# Patient Record
Sex: FEMALE | Race: WHITE | NOT HISPANIC OR LATINO | Employment: FULL TIME | URBAN - METROPOLITAN AREA
[De-identification: names, ages, dates, MRNs, and addresses within clinical notes are randomized per-mention and may not be internally consistent; named-entity substitution may affect disease eponyms.]

---

## 2017-01-06 ENCOUNTER — CARE COORDINATION (OUTPATIENT)
Dept: FAMILY MEDICINE | Facility: CLINIC | Age: 67
End: 2017-01-06

## 2017-01-06 NOTE — PROGRESS NOTES
Care Coordination Assessment    PCP: Barbra Puga    Referral Source:  ED/IP      Clinical Data: Patient discharged from inpatient at [facility] 12/30/2016 S/P right total hip replacement.  Patient discharged home with instructions to follow up with Ortho in 2 weeks.    Plan: I will forward to clinical support to assess and assist with appropriate follow up.

## 2017-01-10 NOTE — TELEPHONE ENCOUNTER
Spoke to Keyonna to see how her hip was coming along.  She said that she is walking with a cane and is doing great.  She has a follow up with her surgeon today and does not need to see PCP at this time.

## 2017-02-07 ENCOUNTER — THERAPY VISIT (OUTPATIENT)
Dept: PHYSICAL THERAPY | Facility: CLINIC | Age: 67
End: 2017-02-07
Payer: COMMERCIAL

## 2017-02-07 DIAGNOSIS — Z47.1 AFTERCARE FOLLOWING RIGHT HIP JOINT REPLACEMENT SURGERY: Primary | ICD-10-CM

## 2017-02-07 DIAGNOSIS — Z96.641 AFTERCARE FOLLOWING RIGHT HIP JOINT REPLACEMENT SURGERY: Primary | ICD-10-CM

## 2017-02-07 DIAGNOSIS — Z96.641 PRESENCE OF RIGHT HIP IMPLANT: ICD-10-CM

## 2017-02-07 PROCEDURE — 97161 PT EVAL LOW COMPLEX 20 MIN: CPT | Mod: GP | Performed by: PHYSICAL THERAPIST

## 2017-02-07 PROCEDURE — 97110 THERAPEUTIC EXERCISES: CPT | Mod: GP | Performed by: PHYSICAL THERAPIST

## 2017-02-07 ASSESSMENT — ACTIVITIES OF DAILY LIVING (ADL)
WALKING_INITIALLY: SLIGHT DIFFICULTY
GETTING_INTO_AND_OUT_OF_AN_AVERAGE_CAR: SLIGHT DIFFICULTY
STEPPING_UP_AND_DOWN_CURBS: SLIGHT DIFFICULTY
HOW_WOULD_YOU_RATE_YOUR_CURRENT_LEVEL_OF_FUNCTION_DURING_YOUR_USUAL_ACTIVITIES_OF_DAILY_LIVING_FROM_0_TO_100_WITH_100_BEING_YOUR_LEVEL_OF_FUNCTION_PRIOR_TO_YOUR_HIP_PROBLEM_AND_0_BEING_THE_INABILITY_TO_PERFORM_ANY_OF_YOUR_USUAL_DAILY_ACTIVITIES?: 90
HOS_ADL_SCORE(%): 70.31
WALKING_15_MINUTES_OR_GREATER: SLIGHT DIFFICULTY
WALKING_APPROXIMATELY_10_MINUTES: SLIGHT DIFFICULTY
RECREATIONAL_ACTIVITIES: SLIGHT DIFFICULTY
DEEP_SQUATTING: MODERATE DIFFICULTY
WALKING_UP_STEEP_HILLS: MODERATE DIFFICULTY
PUTTING_ON_SOCKS_AND_SHOES: EXTREME DIFFICULTY
HEAVY_WORK: SLIGHT DIFFICULTY
HOS_ADL_COUNT: 16
WALKING_DOWN_STEEP_HILLS: MODERATE DIFFICULTY
TWISTING/PIVOTING_ON_INVOLVED_LEG: SLIGHT DIFFICULTY
HOS_ADL_ITEM_SCORE_TOTAL: 45
STANDING_FOR_15_MINUTES: SLIGHT DIFFICULTY
HOS_ADL_HIGHEST_POTENTIAL_SCORE: 64
GOING_DOWN_1_FLIGHT_OF_STAIRS: SLIGHT DIFFICULTY
ROLLING_OVER_IN_BED: SLIGHT DIFFICULTY
GOING_UP_1_FLIGHT_OF_STAIRS: SLIGHT DIFFICULTY
LIGHT_TO_MODERATE_WORK: SLIGHT DIFFICULTY
SITTING_FOR_15_MINUTES: SLIGHT DIFFICULTY

## 2017-02-07 NOTE — Clinical Note
[facility] ATH[facility]IC [facility] PHYSICAL THERAPY  [address] [place] [id number] [phone]    2017    Re: Keyonna Garcia   :   1950  MRN:  [id number]   REFERRING PHYSICIAN:   Rolando Baeza    [facility] ATHLETIC [facility] PHYSICAL [facility]    Date of Initial Evaluation:  2016  Visits:  Rxs Used: 1  Reason for Referral:     Aftercare following right hip joint replacement surgery  Presence of right hip implant    EVALUATION SUMMARY    Subjective:    Keyonna Garcia is a 66 year old female with a right hip condition.  Condition occurred with:  Degenerative joint disease.  Condition occurred: for unknown reasons.  This is a new condition  Patient had a R TKA with anterior approach 2016.  She was using a cane for ambulation but has been going without it lately due to not relying much on it anymore. She lives with her son in an apartment without stairs.  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    Patient reports pain:  Anterior.  Radiates to:  Thigh and low back.  Pain is described as aching and is intermittent and reported as 1/10.  Associated symptoms:  Loss of strength and loss of motion/stiffness. Pain is worse in the A.M..  Exacerbated by: walking 1-2 blocks, position changes, standing 15 minutes, stairs--non-reciprocal pattern usually, sitting 15 minutes, putting shoes/socks on. and relieved by rest.  Since onset symptoms are gradually improving.  Special testing: none.  Previous treatment: none. General health as reported by patient is good.  Pertinent medical history includes:  Osteoarthritis, overweight and high blood pressure.  Medical allergies: no.    Current medications:  High blood pressure medication and other (norvasc,aspirin, simvastatin, prilosec).  Current occupation is RN.  Patient is currently not working due to present treatment  problem (returns to work tomorrow).  Primary job tasks include:  Prolonged sitting.  Barriers include:  None as reported by the patient.  Red flags:  None as reported by the patient.                  Objective:    Gait:  Mild deviation with gait due to decreased R stance time, decreased L step-length, decreased paul  Gait Type:  Antalgic     Hip Evaluation  Hip PROM:  Hip PROM:  Left Hip:    Right Hip:  Not assessed (due to surgery)      Re: Keyonna Garcia   :   1950    Objective continued:    Hip Strength:    Flexion:   Right: 5/5   -  Pain:  Extension:  Right: 5-/5    -  Pain:    Abduction:  Right: 4-/5   +/-   Pain:  Adduction:  Right: 5/5   -  Pain:  Knee Flexion:  Right: 5/5   -  Pain:  Knee Extension:  Right: 5/5    -  Pain:  Hip Palpation:  Palpations normal left hip: incision healing well--no signs of infection noted.  Functional Testing:  Functional test hip: SLS R, floor, EO--mild opposite hip hike noted.    Assessment/Plan:      Patient is a 66 year old female with right side hip complaints s/p DAVE with anterior approach on 2016.  She is doing well overall.  She has decreased strength and altered gait as expected post-surgery.  She should do well with treatment focusing on strengthening exercises to improve overall mobility and function.      Patient has the following significant findings with corresponding treatment plan.                Diagnosis 1:  S/p R DAVE--anterior approach  Pain -  self management, education and home program  Decreased strength - therapeutic exercise, therapeutic activities and home program  Impaired gait - gait training and home program  Decreased function - therapeutic activities and home program  Therapy Evaluation Codes:   1) History comprised of:   Personal factors that impact the plan of care:      None.    Comorbidity factors that impact the plan of care are:      None.     Medications impacting care: None.  2) Examination of Body Systems comprised  of:   Body structures and functions that impact the plan of care:      Hip.   Activity limitations that impact the plan of care are:      Bending, Dressing, Sitting, Squatting/kneeling, Stairs and Walking.  3) Clinical presentation characteristics are:   Stable/Uncomplicated.  4) Decision-Making    Low complexity using standardized patient assessment instrument and/or measureable assessment of functional outcome.  Cumulative Therapy Evaluation is: Low complexity.  Previous and current functional limitations:  (See Goal Flow Sheet for this information)    Short term and Long term goals: (See Goal Flow Sheet for this information)   Communication ability:  Patient appears to be able to clearly communicate and understand verbal and written communication and follow directions correctly.  Treatment Explanation - The following has been discussed with the patient:   RX ordered/plan of care  Anticipated outcomes  Possible risks and side effects      Re: Keyonna Garcia   :   1950    Assessment continued:    This patient would benefit from PT intervention to resume normal activities.   Rehab potential is good.    Frequency:  1 X week, once daily  Duration:  for 4 weeks tapering to 2 X a month over 1 month  Discharge Plan:  Achieve all LTG.  Independent in home treatment program.  Reach maximal therapeutic benefit.                    Thank you for your referral.    INQUIRIES  Therapist: BAIRON RomanoT, Cert. MDT  [facility] PHYSICAL THERAPY  [address] [address]  Phone: [phone]  Fax: [phone]

## 2017-02-07 NOTE — PROGRESS NOTES
Subjective:    Keyonna Gracia is a 66 year old female with a right hip condition.  Condition occurred with:  Degenerative joint disease.  Condition occurred: for unknown reasons.  This is a new condition  Patient had a R TKA with anterior approach 12/27/2016.  She was using a cane for ambulation but has been going without it lately due to not relying much on it anymore. She lives with her son in an apartment without stairs.  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    Patient reports pain:  Anterior.  Radiates to:  Thigh and low back.  Pain is described as aching and is intermittent and reported as 1/10.  Associated symptoms:  Loss of strength and loss of motion/stiffness. Pain is worse in the A.M..  Exacerbated by: walking 1-2 blocks, position changes, standing 15 minutes, stairs--non-reciprocal pattern usually, sitting 15 minutes, putting shoes/socks on. and relieved by rest.  Since onset symptoms are gradually improving.  Special testing: none.  Previous treatment: none.    General health as reported by patient is good.  Pertinent medical history includes:  Osteoarthritis, overweight and high blood pressure.  Medical allergies: no.    Current medications:  High blood pressure medication and other (norvasc,aspirin, simvastatin, prilosec).  Current occupation is RN.  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  Primary job tasks include:  Prolonged sitting.    Barriers include:  None as reported by the patient.    Red flags:  None as reported by the patient.                      Objective:      Gait:  Mild deviation with gait due to decreased R stance time, decreased L step-length, decreased paul  Gait Type:  Antalgic                                                      Hip Evaluation  Hip PROM:  Hip PROM:  Left Hip:    Right Hip:  Not assessed (due to surgery)                           Hip Strength:    Flexion:   Right: 5/5   -  Pain:                    Extension:  Right: 5-/5    -  Pain:    Abduction:  Right: 4-/5   +/-   Pain:  Adduction:  Right: 5/5   -  Pain:      Knee Flexion:  Right: 5/5   -  Pain:  Knee Extension:  Right: 5/5    -  Pain:          Hip Palpation:  Palpations normal left hip: incision healing well--no signs of infection noted.      Functional Testing:  Functional test hip: SLS R, floor, EO--mild opposite hip hike noted.                         General     ROS    Assessment/Plan:      Patient is a 66 year old female with right side hip complaints s/p DAVE with anterior approach on 12/27/2016.  She is doing well overall.  She has decreased strength and altered gait as expected post-surgery.  She should do well with treatment focusing on strengthening exercises to improve overall mobility and function.      Patient has the following significant findings with corresponding treatment plan.                Diagnosis 1:  S/p R DAVE--anterior approach  Pain -  self management, education and home program  Decreased strength - therapeutic exercise, therapeutic activities and home program  Impaired gait - gait training and home program  Decreased function - therapeutic activities and home program    Therapy Evaluation Codes:   1) History comprised of:   Personal factors that impact the plan of care:      None.    Comorbidity factors that impact the plan of care are:      None.     Medications impacting care: None.  2) Examination of Body Systems comprised of:   Body structures and functions that impact the plan of care:      Hip.   Activity limitations that impact the plan of care are:      Bending, Dressing, Sitting, Squatting/kneeling, Stairs and Walking.  3) Clinical presentation characteristics are:   Stable/Uncomplicated.  4) Decision-Making    Low complexity using standardized patient assessment instrument and/or measureable assessment of functional outcome.  Cumulative Therapy  Evaluation is: Low complexity.    Previous and current functional limitations:  (See Goal Flow Sheet for this information)    Short term and Long term goals: (See Goal Flow Sheet for this information)     Communication ability:  Patient appears to be able to clearly communicate and understand verbal and written communication and follow directions correctly.  Treatment Explanation - The following has been discussed with the patient:   RX ordered/plan of care  Anticipated outcomes  Possible risks and side effects  This patient would benefit from PT intervention to resume normal activities.   Rehab potential is good.    Frequency:  1 X week, once daily  Duration:  for 4 weeks tapering to 2 X a month over 1 month  Discharge Plan:  Achieve all LTG.  Independent in home treatment program.  Reach maximal therapeutic benefit.    Please refer to the daily flowsheet for treatment today, total treatment time and time spent performing 1:1 timed codes.

## 2017-02-14 ENCOUNTER — THERAPY VISIT (OUTPATIENT)
Dept: PHYSICAL THERAPY | Facility: CLINIC | Age: 67
End: 2017-02-14
Payer: COMMERCIAL

## 2017-02-14 DIAGNOSIS — Z96.641 PRESENCE OF RIGHT HIP IMPLANT: ICD-10-CM

## 2017-02-14 DIAGNOSIS — Z96.641 AFTERCARE FOLLOWING RIGHT HIP JOINT REPLACEMENT SURGERY: ICD-10-CM

## 2017-02-14 DIAGNOSIS — Z47.1 AFTERCARE FOLLOWING RIGHT HIP JOINT REPLACEMENT SURGERY: ICD-10-CM

## 2017-02-14 PROCEDURE — 97530 THERAPEUTIC ACTIVITIES: CPT | Mod: GP | Performed by: PHYSICAL THERAPIST

## 2017-02-14 PROCEDURE — 97110 THERAPEUTIC EXERCISES: CPT | Mod: GP | Performed by: PHYSICAL THERAPIST

## 2017-02-28 ENCOUNTER — THERAPY VISIT (OUTPATIENT)
Dept: PHYSICAL THERAPY | Facility: CLINIC | Age: 67
End: 2017-02-28
Payer: COMMERCIAL

## 2017-02-28 DIAGNOSIS — Z96.641 AFTERCARE FOLLOWING RIGHT HIP JOINT REPLACEMENT SURGERY: ICD-10-CM

## 2017-02-28 DIAGNOSIS — Z96.641 PRESENCE OF RIGHT HIP IMPLANT: ICD-10-CM

## 2017-02-28 DIAGNOSIS — Z47.1 AFTERCARE FOLLOWING RIGHT HIP JOINT REPLACEMENT SURGERY: ICD-10-CM

## 2017-02-28 PROCEDURE — 97530 THERAPEUTIC ACTIVITIES: CPT | Mod: GP | Performed by: PHYSICAL THERAPIST

## 2017-02-28 PROCEDURE — 97110 THERAPEUTIC EXERCISES: CPT | Mod: GP | Performed by: PHYSICAL THERAPIST

## 2017-02-28 ASSESSMENT — ACTIVITIES OF DAILY LIVING (ADL)
WALKING_INITIALLY: NO DIFFICULTY AT ALL
STEPPING_UP_AND_DOWN_CURBS: NO DIFFICULTY AT ALL
LIGHT_TO_MODERATE_WORK: NO DIFFICULTY AT ALL
WALKING_DOWN_STEEP_HILLS: NO DIFFICULTY AT ALL
HEAVY_WORK: SLIGHT DIFFICULTY
HOS_ADL_HIGHEST_POTENTIAL_SCORE: 64
GETTING_INTO_AND_OUT_OF_AN_AVERAGE_CAR: NO DIFFICULTY AT ALL
HOS_ADL_COUNT: 16
DEEP_SQUATTING: NO DIFFICULTY AT ALL
TWISTING/PIVOTING_ON_INVOLVED_LEG: NO DIFFICULTY AT ALL
RECREATIONAL_ACTIVITIES: SLIGHT DIFFICULTY
PUTTING_ON_SOCKS_AND_SHOES: MODERATE DIFFICULTY
GOING_UP_1_FLIGHT_OF_STAIRS: NO DIFFICULTY AT ALL
HOS_ADL_ITEM_SCORE_TOTAL: 62
WALKING_15_MINUTES_OR_GREATER: NO DIFFICULTY AT ALL
HOW_WOULD_YOU_RATE_YOUR_CURRENT_LEVEL_OF_FUNCTION_DURING_YOUR_USUAL_ACTIVITIES_OF_DAILY_LIVING_FROM_0_TO_100_WITH_100_BEING_YOUR_LEVEL_OF_FUNCTION_PRIOR_TO_YOUR_HIP_PROBLEM_AND_0_BEING_THE_INABILITY_TO_PERFORM_ANY_OF_YOUR_USUAL_DAILY_ACTIVITIES?: 95
WALKING_APPROXIMATELY_10_MINUTES: NO DIFFICULTY AT ALL
GOING_DOWN_1_FLIGHT_OF_STAIRS: NO DIFFICULTY AT ALL
ROLLING_OVER_IN_BED: NO DIFFICULTY AT ALL
HOS_ADL_SCORE(%): 96.88
SITTING_FOR_15_MINUTES: NO DIFFICULTY AT ALL
STANDING_FOR_15_MINUTES: NO DIFFICULTY AT ALL
WALKING_UP_STEEP_HILLS: NO DIFFICULTY AT ALL

## 2017-02-28 NOTE — PROGRESS NOTES
"Subjective:    HPI                    Objective:    System    Physical Exam    General     ROS    Assessment/Plan:      PROGRESS  REPORT    Progress reporting period is from 02/07/2017 to 02/28/2017.       SUBJECTIVE  Subjective: Patient reports her R hip is doing well--\"much better than I thought it would be.\"  She feels like she is walking much better and only notices a mild limp now.  She can do stairs reciprocally with a railing.  She feels like she is making good progress.     Current Pain level: 0/10.     Initial Pain level: 1/10.   Changes in function:  Yes, improved walking pattern and tolerance, able to negotiate stairs reciprocally with railing.  Adverse reaction to treatment or activity: None    OBJECTIVE  Objective: Gait:  mild deviation due to decreased R hip strength, decreased R hip extension.  R hip strength:  flexion 5-/5, abduction 4+/5.       ASSESSMENT/PLAN  Patient has been seen for 4 visits with treatment focusing on strengthening and proprioception exercises for the R hip.  She has made good progress throughout treatment reporting improvements with pain, strength, and overall function.  She has a good understanding of her HEP and should do well continuing with her HEP independently at this point to further progress her strength, mobility, and function.    Updated problem list and treatment plan: Diagnosis 1:  S/p R DAVE--anterolateral approach  Pain -  self management, education and home program  Decreased ROM/flexibility - home program  Decreased strength - home program  Decreased proprioception - home program  Impaired gait - home program  Decreased function - home program  STG/LTGs have been met or progress has been made towards goals:  Yes (See Goal flow sheet completed today.)  Assessment of Progress: The patient's condition is improving.  Self Management Plans:  Patient has been instructed in a home treatment program.  Patient is independent in a home treatment program.  Patient  has been " instructed in self management of symptoms.  Patient is independent in self management of symptoms.  Keyonna continues to require the following intervention to meet STG and LTG's:  PT intervention is no longer required to meet STG/LTG.    Recommendations:  Patient will continue with her HEP independently at this point and return for a follow-up visit if needed.  If no further follow-up visits are scheduled, patient will be discharged from PT.    Please refer to the daily flowsheet for treatment today, total treatment time and time spent performing 1:1 timed codes.

## 2017-02-28 NOTE — LETTER
"[place] FOR [facility] PHYSICAL THERAPY  [address] [id number] [phone]    2017    Re: Keyonna Garcia   :   1950  MRN:  [id number]   REFERRING PHYSICIAN:   Rolando Baeza    [facility] ATHLETIC [facility] PHYSICAL [facility]    Date of Initial Evaluation:  2017  Visits:  Rxs Used: 4  Reason for Referral:     Aftercare following right hip joint replacement surgery  Presence of right hip implant    PROGRESS  REPORT  Progress reporting period is from 2017 to 2017.       SUBJECTIVE  Subjective: Patient reports her R hip is doing well--\"much better than I thought it would be.\"  She feels like she is walking much better and only notices a mild limp now.  She can do stairs reciprocally with a railing.  She feels like she is making good progress.     Current Pain level: 0/10.     Initial Pain level: 1/10.   Changes in function:  Yes, improved walking pattern and tolerance, able to negotiate stairs reciprocally with railing.  Adverse reaction to treatment or activity: None    OBJECTIVE  Objective: Gait:  mild deviation due to decreased R hip strength, decreased R hip extension.  R hip strength:  flexion 5-/5, abduction 4+/5.       ASSESSMENT/PLAN  Patient has been seen for 4 visits with treatment focusing on strengthening and proprioception exercises for the R hip.  She has made good progress throughout treatment reporting improvements with pain, strength, and overall function.  She has a good understanding of her HEP and should do well continuing with her HEP independently at this point to further progress her strength, mobility, and function.    Updated problem list and treatment plan: Diagnosis 1:  S/p R DAVE--anterolateral approach  Pain -  self management, education and home program  Decreased ROM/flexibility - home program  Decreased strength - home program  Decreased proprioception - home program  Impaired gait " - home program  Decreased function - home program    STG/LTGs have been met or progress has been made towards goals:  Yes (See Goal flow sheet completed today.)  Assessment of Progress: The patient's condition is improving.  Self Management Plans:  Patient has been instructed in a home treatment program.  Patient is independent in a home treatment program.  Patient  has been instructed in self management of symptoms.  Patient is independent in self management of symptoms.  Keyonna continues to require the following intervention to meet STG and LTG's:  PT intervention is no longer required to meet STG/LTG.    Recommendations:  Patient will continue with her HEP independently at this point and return for a follow-up visit if needed.  If no further follow-up visits are scheduled, patient will be discharged from PT.    Thank you for your referral.    INQUIRIES  Therapist: Jessica Santana, PT  [facility] PHYSICAL THERAPY  [address] [place] [id number]  Phone: [phone]  Fax: [phone]

## 2017-02-28 NOTE — MR AVS SNAPSHOT
After Visit Summary   2/28/2017    Keyonna Garcia    MRN: [id number]           Patient Information     Date Of Birth          1950        Visit Information        Provider Department      2/28/2017 7:30 AM Jessica Santana PT [facility] Physical Therapy        Today's Diagnoses     Aftercare following right hip joint replacement surgery        Presence of right hip implant           Follow-ups after your visit        Who to contact     If you have questions or need follow up information about today's clinic visit or your schedule please contact [facility] ATHLETIC [facility] PHYSICAL THERAPY directly at [phone].  Normal or non-critical lab and imaging results will be communicated to you by scroll kithart, letter or phone within 4 business days after the clinic has received the results. If you do not hear from us within 7 days, please contact the clinic through scroll kithart or phone. If you have a critical or abnormal lab result, we will notify you by phone as soon as possible.  Submit refill requests through [employer] or call your pharmacy and they will forward the refill request to us. Please allow 3 business days for your refill to be completed.          Additional Information About Your Visit        MyChart Information     [employer] gives you secure access to your electronic health record. If you see a primary care provider, you can also send messages to your care team and make appointments. If you have questions, please call your primary care clinic.  If you do not have a primary care provider, please call [phone] and they will assist you.        Care EveryWhere ID     This is your Care EveryWhere ID. This could be used by other organizations to access your [place] medical records  [id number]         Blood Pressure from Last 3 Encounters:   12/30/16 138/69   12/15/16 120/72   07/26/16 136/84    Weight from Last 3 Encounters:   12/27/16 81.6 kg (180  lb)   12/15/16 82.1 kg (181 lb)   07/26/16 79.1 kg (174 lb 6.4 oz)              We Performed the Following     DENISE Progress Notes Report     Therapeutic Activities     Therapeutic Exercises        Primary Care Provider Office Phone # Fax #    TEENA Gastelum [phone]       [facility]  [address] [facility] [place] [address]        Thank you!     Thank you for choosing [place] [facility] PHYSICAL THERAPY  for your care. Our goal is always to provide you with excellent care. Hearing back from our patients is one way we can continue to improve our services. Please take a few minutes to complete the written survey that you may receive in the mail after your visit with us. Thank you!             Your Updated Medication List - Protect others around you: Learn how to safely use, store and throw away your medicines at www.disposemymeds.org.          This list is accurate as of: 2/28/17 10:06 AM.  Always use your most recent med list.                   Brand Name Dispense Instructions for use    amLODIPine 5 MG tablet    NORVASC    90 tablet    TAKE 1 TABLET (5 MG) BY MOUTH  ONCE DAILY       aspirin  MG EC tablet     90 tablet    Take 1 tablet (325 mg) by mouth every 12 hours       cetirizine 10 MG tablet    zyrTEC     Take 5 mg by mouth daily       desonide 0.05 % cream    DESOWEN    60 g    Apply sparingly to affected area once a day as needed,       ferrous sulfate Dried 160 (50 FE) MG tablet      Take 1 tablet by mouth daily (with breakfast)       omeprazole 20 MG CR capsule    priLOSEC    90 capsule    TAKE 1 CAPSULE (20 MG) BY MOUTH DAILY       order for DME     1 each    Equipment being ordered: Walker Wheels () Treatment Diagnosis: Difficulty walking       oxyCODONE-acetaminophen 5-325 MG per tablet    PERCOCET    45 tablet    Take 1-2 tablets by mouth every 4 hours as needed for moderate to severe pain       pravastatin 20 MG tablet     PRAVACHOL    90 tablet    TAKE ONE TABLET BY MOUTH EVERY NIGHT AT BEDTIME       prochlorperazine 10 MG tablet    COMPAZINE    20 tablet    Take 1 tablet (10 mg) by mouth every 6 hours as needed for nausea or vomiting       VITAMIN C PO      Take 500 mg by mouth daily

## 2017-04-21 ENCOUNTER — DOCUMENTATION ONLY (OUTPATIENT)
Dept: OTHER | Facility: CLINIC | Age: 67
End: 2017-04-21

## 2017-04-21 DIAGNOSIS — Z71.89 ACP (ADVANCE CARE PLANNING): Chronic | ICD-10-CM

## 2017-05-25 ENCOUNTER — OFFICE VISIT (OUTPATIENT)
Dept: DERMATOLOGY | Facility: CLINIC | Age: 67
End: 2017-05-25
Payer: COMMERCIAL

## 2017-05-25 VITALS — DIASTOLIC BLOOD PRESSURE: 89 MMHG | SYSTOLIC BLOOD PRESSURE: 139 MMHG | OXYGEN SATURATION: 97 % | HEART RATE: 77 BPM

## 2017-05-25 DIAGNOSIS — L21.9 DERMATITIS, SEBORRHEIC: Primary | ICD-10-CM

## 2017-05-25 PROCEDURE — 99202 OFFICE O/P NEW SF 15 MIN: CPT | Performed by: PHYSICIAN ASSISTANT

## 2017-05-25 RX ORDER — FLUCONAZOLE 200 MG/1
TABLET ORAL
Qty: 5 TABLET | Refills: 3 | Status: SHIPPED | OUTPATIENT
Start: 2017-05-25 | End: 2017-08-01

## 2017-05-25 RX ORDER — KETOCONAZOLE 20 MG/G
CREAM TOPICAL
Qty: 60 G | Refills: 3 | Status: SHIPPED | OUTPATIENT
Start: 2017-05-25 | End: 2022-06-24

## 2017-05-25 NOTE — MR AVS SNAPSHOT
After Visit Summary   5/25/2017    Keyonna Garcia    MRN: [id number]           Patient Information     Date Of Birth          1950        Visit Information        Provider Department      5/25/2017 8:15 AM Cheyenne Wall PA-C [facility]        Today's Diagnoses     Dermatitis, seborrheic    -  1       Follow-ups after your visit        Who to contact     If you have questions or need follow up information about today's clinic visit or your schedule please contact [facility] directly at [phone].  Normal or non-critical lab and imaging results will be communicated to you by [employer], letter or phone within 4 business days after the clinic has received the results. If you do not hear from us within 7 days, please contact the clinic through [employer] or phone. If you have a critical or abnormal lab result, we will notify you by phone as soon as possible.  Submit refill requests through Lightbox or call your pharmacy and they will forward the refill request to us. Please allow 3 business days for your refill to be completed.          Additional Information About Your Visit        MyChart Information     Lightbox gives you secure access to your electronic health record. If you see a primary care provider, you can also send messages to your care team and make appointments. If you have questions, please call your primary care clinic.  If you do not have a primary care provider, please call [phone] and they will assist you.        Care EveryWhere ID     This is your Care EveryWhere ID. This could be used by other organizations to access your [place] medical records  [id number]        Your Vitals Were     Pulse Pulse Oximetry                77 97%           Blood Pressure from Last 3 Encounters:   05/25/17 139/89   12/30/16 138/69   12/15/16 120/72    Weight from Last 3 Encounters:   12/27/16 81.6 kg (180 lb)   12/15/16 82.1 kg (181 lb)    07/26/16 79.1 kg (174 lb 6.4 oz)              Today, you had the following     No orders found for display         Today's Medication Changes          These changes are accurate as of: 5/25/17 12:47 PM.  If you have any questions, ask your nurse or doctor.               Start taking these medicines.        Dose/Directions    fluconazole 200 MG tablet   Commonly known as:  DIFLUCAN   Used for:  Dermatitis, seborrheic   Started by:  Cheyenne Wall PA-C        1 tab PO daily x 5 days   Quantity:  5 tablet   Refills:  3       ketoconazole 2 % cream   Commonly known as:  NIZORAL   Used for:  Dermatitis, seborrheic   Started by:  Cheyenne Wall PA-C        Apply to AA BID PRN   Quantity:  60 g   Refills:  3            Where to get your medicines      These medications were sent to [facility][id number] - [place], [place] - [address]  [address] , [place] [address]     Phone:  [phone]     fluconazole 200 MG tablet    ketoconazole 2 % cream                Primary Care Provider Office Phone # Fax #    TEENA Gastelum [phone]       [facility]  [address] [facility] [address]        Thank you!     Thank you for choosing [facility]  for your care. Our goal is always to provide you with excellent care. Hearing back from our patients is one way we can continue to improve our services. Please take a few minutes to complete the written survey that you may receive in the mail after your visit with us. Thank you!             Your Updated Medication List - Protect others around you: Learn how to safely use, store and throw away your medicines at www.disposemymeds.org.          This list is accurate as of: 5/25/17 12:47 PM.  Always use your most recent med list.                   Brand Name Dispense Instructions for use    amLODIPine 5 MG tablet    NORVASC    90 tablet    TAKE 1 TABLET (5 MG) BY MOUTH  ONCE DAILY       * aspirin  81 MG tablet      Take 81 mg by mouth daily       * aspirin  MG EC tablet     90 tablet    Take 1 tablet (325 mg) by mouth every 12 hours       cetirizine 10 MG tablet    zyrTEC     Take 5 mg by mouth daily       desonide 0.05 % cream    DESOWEN    60 g    Apply sparingly to affected area once a day as needed,       ferrous sulfate Dried 160 (50 FE) MG tablet      Take 1 tablet by mouth daily (with breakfast)       fluconazole 200 MG tablet    DIFLUCAN    5 tablet    1 tab PO daily x 5 days       ketoconazole 2 % cream    NIZORAL    60 g    Apply to AA BID PRN       omeprazole 20 MG CR capsule    priLOSEC    90 capsule    TAKE 1 CAPSULE (20 MG) BY MOUTH DAILY       order for DME     1 each    Equipment being ordered: Walker Wheels () Treatment Diagnosis: Difficulty walking       oxyCODONE-acetaminophen 5-325 MG per tablet    PERCOCET    45 tablet    Take 1-2 tablets by mouth every 4 hours as needed for moderate to severe pain       pravastatin 20 MG tablet    PRAVACHOL    90 tablet    TAKE ONE TABLET BY MOUTH EVERY NIGHT AT BEDTIME       prochlorperazine 10 MG tablet    COMPAZINE    20 tablet    Take 1 tablet (10 mg) by mouth every 6 hours as needed for nausea or vomiting       VITAMIN C PO      Take 500 mg by mouth daily       * Notice:  This list has 2 medication(s) that are the same as other medications prescribed for you. Read the directions carefully, and ask your doctor or other care provider to review them with you.

## 2017-05-25 NOTE — PROGRESS NOTES
HPI:   Keyonna Garcia is a 66 year old female who presents for evaluation of red spots on the face  chief complaint  Location: face - started between eyebrows and has moved down the face   Condition present for:  For a few months.   Previous treatments include: desonide cream which burns and doesn't seem to relieve the redness.     Review Of Systems  Eyes: negative  Ears/Nose/Throat: negative  Respiratory: No shortness of breath, dyspnea on exertion, cough, or hemoptysis  Cardiovascular: negative  Gastrointestinal: negative  Genitourinary: negative  Musculoskeletal: negative  Neurologic: negative  Psychiatric: negative        PHYSICAL EXAM:      Skin exam performed as follows: Type 2 skin. Mood appropriate  Alert and Oriented X 3. Well developed, well nourished in no distress.  General appearance: Normal  Head including face: Normal  Eyes: conjunctiva and lids: Normal  Mouth: Lips, teeth, gums: Normal  Neck: Normal  Chest-breast/axillae: Normal  Back: Normal  Spleen and liver: Normal  Cardiovascular: Exam of peripheral vascular system by observation for swelling, varicosities, edema: Normal  Genitalia: groin, buttocks: Normal  Extremities: digits/nails (clubbing): Normal  Eccrine and Apocrine glands: Normal  Right upper extremity: Normal  Left upper extremity: Normal  Right lower extremity: Normal  Left lower extremity: Normal  Skin: Scalp and body hair: See below    1. Flaking and erythema on glabella, brows, ML folds and cheeks    ASSESSMENT/PLAN:     Seborrheic dermatitis - advised on chronic, recurrent condition. Discussed that it is a reaction to the normal yeast on the skin. Has tried desonide in the past. Does tend to be very sensitive and numerous products make her skin burn.   --Start diflucan 200 mg daily x 5 days  --Start ketoconazole cream BID as needed  --Consider Avene Xeracalm wash and moisturizer QD-BID          Follow-up: PRN  CC:   Scribed By: Cheyenne Wall, MS, PAYoanaC

## 2017-05-25 NOTE — NURSING NOTE
"Chief Complaint   Patient presents with     Derm Problem     red and irritatedd pataches on face - worse over the last year - h/o BCC on lip        Initial /89  Pulse 77  SpO2 97% Estimated body mass index is 32.92 kg/(m^2) as calculated from the following:    Height as of 12/27/16: 1.575 m (5' 2\").    Weight as of 12/27/16: 81.6 kg (180 lb).  Medication Reconciliation: complete    "

## 2017-06-23 DIAGNOSIS — E78.2 MIXED HYPERLIPIDEMIA: ICD-10-CM

## 2017-06-23 DIAGNOSIS — I10 ESSENTIAL HYPERTENSION, BENIGN: ICD-10-CM

## 2017-06-23 DIAGNOSIS — K21.9 GASTROESOPHAGEAL REFLUX DISEASE WITHOUT ESOPHAGITIS: ICD-10-CM

## 2017-06-23 NOTE — TELEPHONE ENCOUNTER
Pending Prescriptions:                       Disp   Refills    omeprazole (PRILOSEC) 20 MG CR capsule [P*30 cap*0            Sig: TAKE 1 CAPSULE BY MOUTH DAILY     amLODIPine (NORVASC) 5 MG tablet [Pharmac*30 tab*0            Sig: TAKE 1 TABLET BY MOUTH ONCE DAILY     pravastatin (PRAVACHOL) 20 MG tablet [Pha*30 tab*0            Sig: TAKE 1 TABELT BY MOUTH EVERY NIGHT AT BEDTIME     Please fax 30 to pharmacy/unable to leave VM on machine  Pt is due for FASTING OV  Please send to [facility]  [phone] (home) [phone] (work)

## 2017-06-25 RX ORDER — PRAVASTATIN SODIUM 20 MG
TABLET ORAL
Qty: 30 TABLET | Refills: 0 | Status: SHIPPED | OUTPATIENT
Start: 2017-06-25 | End: 2017-08-01

## 2017-06-25 RX ORDER — AMLODIPINE BESYLATE 5 MG/1
TABLET ORAL
Qty: 30 TABLET | Refills: 0 | Status: SHIPPED | OUTPATIENT
Start: 2017-06-25 | End: 2017-08-01

## 2017-06-26 NOTE — TELEPHONE ENCOUNTER
Please let the patient know that a 30 day refill has been sent for their prescription of his/her medication that was requested.  They are due for a return FASTING COMPLETE PHYSICAL within 30 days.  Failure to schedule an appointment may result in no further refills of his/her medication.

## 2017-08-01 ENCOUNTER — OFFICE VISIT (OUTPATIENT)
Dept: FAMILY MEDICINE | Facility: CLINIC | Age: 67
End: 2017-08-01

## 2017-08-01 VITALS
DIASTOLIC BLOOD PRESSURE: 82 MMHG | TEMPERATURE: 97.6 F | BODY MASS INDEX: 31.96 KG/M2 | OXYGEN SATURATION: 98 % | WEIGHT: 180.4 LBS | HEART RATE: 65 BPM | SYSTOLIC BLOOD PRESSURE: 150 MMHG | HEIGHT: 63 IN

## 2017-08-01 DIAGNOSIS — F43.23 ADJUSTMENT DISORDER WITH MIXED ANXIETY AND DEPRESSED MOOD: ICD-10-CM

## 2017-08-01 DIAGNOSIS — Z12.31 ENCOUNTER FOR SCREENING MAMMOGRAM FOR BREAST CANCER: ICD-10-CM

## 2017-08-01 DIAGNOSIS — K21.9 GASTROESOPHAGEAL REFLUX DISEASE WITHOUT ESOPHAGITIS: ICD-10-CM

## 2017-08-01 DIAGNOSIS — Z85.828 HISTORY OF BASAL CELL CARCINOMA: ICD-10-CM

## 2017-08-01 DIAGNOSIS — R06.81 APNEA: ICD-10-CM

## 2017-08-01 DIAGNOSIS — I10 ESSENTIAL HYPERTENSION, BENIGN: ICD-10-CM

## 2017-08-01 DIAGNOSIS — Z23 NEED FOR VACCINATION: ICD-10-CM

## 2017-08-01 DIAGNOSIS — E78.2 MIXED HYPERLIPIDEMIA: ICD-10-CM

## 2017-08-01 DIAGNOSIS — M85.80 OSTEOPENIA, UNSPECIFIED LOCATION: ICD-10-CM

## 2017-08-01 DIAGNOSIS — Z01.419 ENCOUNTER FOR GYNECOLOGICAL EXAMINATION WITHOUT ABNORMAL FINDING: Primary | ICD-10-CM

## 2017-08-01 PROCEDURE — 99397 PER PM REEVAL EST PAT 65+ YR: CPT | Mod: 25 | Performed by: PHYSICIAN ASSISTANT

## 2017-08-01 PROCEDURE — 90472 IMMUNIZATION ADMIN EACH ADD: CPT | Performed by: PHYSICIAN ASSISTANT

## 2017-08-01 PROCEDURE — 90471 IMMUNIZATION ADMIN: CPT | Performed by: PHYSICIAN ASSISTANT

## 2017-08-01 PROCEDURE — 80053 COMPREHEN METABOLIC PANEL: CPT | Mod: 90 | Performed by: PHYSICIAN ASSISTANT

## 2017-08-01 PROCEDURE — 90670 PCV13 VACCINE IM: CPT | Performed by: PHYSICIAN ASSISTANT

## 2017-08-01 PROCEDURE — 80061 LIPID PANEL: CPT | Mod: 90 | Performed by: PHYSICIAN ASSISTANT

## 2017-08-01 PROCEDURE — 36415 COLL VENOUS BLD VENIPUNCTURE: CPT | Performed by: PHYSICIAN ASSISTANT

## 2017-08-01 PROCEDURE — 90715 TDAP VACCINE 7 YRS/> IM: CPT | Performed by: PHYSICIAN ASSISTANT

## 2017-08-01 RX ORDER — BUPROPION HYDROCHLORIDE 150 MG/1
150 TABLET, EXTENDED RELEASE ORAL DAILY
COMMUNITY
End: 2017-08-01

## 2017-08-01 RX ORDER — HYDROCHLOROTHIAZIDE 25 MG/1
25 TABLET ORAL DAILY
Qty: 90 TABLET | Refills: 0 | Status: SHIPPED | OUTPATIENT
Start: 2017-08-01 | End: 2017-09-14

## 2017-08-01 RX ORDER — PRAVASTATIN SODIUM 20 MG
TABLET ORAL
Qty: 90 TABLET | Refills: 3 | Status: SHIPPED | OUTPATIENT
Start: 2017-08-01 | End: 2018-07-17

## 2017-08-01 RX ORDER — BUPROPION HYDROCHLORIDE 150 MG/1
150 TABLET, EXTENDED RELEASE ORAL DAILY
Qty: 90 TABLET | Refills: 3 | Status: SHIPPED | OUTPATIENT
Start: 2017-08-01 | End: 2017-09-14 | Stop reason: DRUGHIGH

## 2017-08-01 RX ORDER — AMLODIPINE BESYLATE 5 MG/1
5 TABLET ORAL DAILY
Qty: 90 TABLET | Refills: 0 | Status: SHIPPED | OUTPATIENT
Start: 2017-08-01 | End: 2017-09-14

## 2017-08-01 ASSESSMENT — ANXIETY QUESTIONNAIRES
5. BEING SO RESTLESS THAT IT IS HARD TO SIT STILL: SEVERAL DAYS
3. WORRYING TOO MUCH ABOUT DIFFERENT THINGS: SEVERAL DAYS
7. FEELING AFRAID AS IF SOMETHING AWFUL MIGHT HAPPEN: SEVERAL DAYS
IF YOU CHECKED OFF ANY PROBLEMS ON THIS QUESTIONNAIRE, HOW DIFFICULT HAVE THESE PROBLEMS MADE IT FOR YOU TO DO YOUR WORK, TAKE CARE OF THINGS AT HOME, OR GET ALONG WITH OTHER PEOPLE: NOT DIFFICULT AT ALL
2. NOT BEING ABLE TO STOP OR CONTROL WORRYING: SEVERAL DAYS
GAD7 TOTAL SCORE: 7
1. FEELING NERVOUS, ANXIOUS, OR ON EDGE: SEVERAL DAYS
6. BECOMING EASILY ANNOYED OR IRRITABLE: SEVERAL DAYS

## 2017-08-01 ASSESSMENT — PATIENT HEALTH QUESTIONNAIRE - PHQ9: 5. POOR APPETITE OR OVEREATING: SEVERAL DAYS

## 2017-08-01 NOTE — NURSING NOTE
"Keyonna Garcia is here for a CPX. Fasting: Yes (12+ hours).    Pre-visit Planning  Immunizations -not up to date - Tdap and Prevnar 13 Today  Colonoscopy -is due and to be scheduled (Patient Declined)  Mammogram -is due and to be scheduled by patient for later completion  Asthma test --NA  PHQ9 -is completed today  HOWARD 7 -is completed today  Fall Risk Assessment -is completed today    COGNITIVE SCREEN  1) Repeat 3 items (Banana, Sunrise, Chair)    2) Clock draw: NORMAL  3) 3 item recall: Recalls 3 objects  Results: Normal Clock, 3 items recalled: COGNITIVE IMPAIRMENT LESS LIKELY    Mini-CogTM Copyright S Jj. Licensed by the author for use in [facility]; reprinted with permission (luis@[facility]). All rights reserved.          Vitals:  BP Cuff left  Arm with large Cuff  PULSE regular  180 lbs 6.4 oz and 5' 2.5\"  CLASSIFICATION OF OVERWEIGHT AND OBESITY BY BMI                        Obesity Class           BMI(kg/m2)  Underweight                                    < 18.5  Normal                                         18.5-24.9  Overweight                                     25.0-29.9  OBESITY                     I                  30.0-34.9                             II                 35.0-39.9  EXTREME OBESITY             III                >40      Patient's  BMI Body mass index is 32.47 kg/(m^2).  Http://hin.nhlbi.nih.gov/menuplanner/menu.cgi  My Chart: - accepts   Tobacco Use:  Questioned patient about current smoking habits.  Pt. quit smoking some time ago.  ETOH screening Questions:  1-How often do you have a drink containing alcohol?                             1 times per month(s)  2-How many drinks containing alcohol do you have on a typical day when you are         Drinking?                              1   3- How often do you have 5 or more drinks on one occasion?                              Never     Have you ever:  None of the patient's responses to the CAGE screening were positive / " Negative CAGE score    Roomed by: Pau Montiel, CMA

## 2017-08-01 NOTE — PATIENT INSTRUCTIONS
1. Take fecal occult test at home and drop off or mail in    2. Calcium intake 4549-9979 mg daily, Vit D 800-1,000 D3

## 2017-08-01 NOTE — MR AVS SNAPSHOT
After Visit Summary   8/1/2017    Keyonna Garcia    MRN: [id number]           Patient Information     Date Of Birth          1950        Visit Information        Provider Department      8/1/2017 10:30 AM Barbra Puga PA [facility]        Today's Diagnoses     Encounter for gynecological examination without abnormal finding    -  1    Essential hypertension, benign        Mixed hyperlipidemia        Gastroesophageal reflux disease without esophagitis        History of basal cell carcinoma        Osteopenia, repeat DEXA 2022        Need for vaccination        Encounter for screening mammogram for breast cancer        Adjustment disorder with mixed anxiety and depressed mood        Apnea          Care Instructions    1. Take fecal occult test at home and drop off or mail in    2. Calcium intake 9873-4728 mg daily, Vit D 800-1,000 D3              Follow-ups after your visit        Additional Services     Sleep Study Referral       [place] [facility] [phone]                  Future tests that were ordered for you today     Open Future Orders        Priority Expected Expires Ordered    Mammo Screening digital (bilat) Routine  8/1/2018 8/1/2017            Who to contact     If you have questions or need follow up information about today's clinic visit or your schedule please contact [facility] directly at [phone].  Normal or non-critical lab and imaging results will be communicated to you by MyChart, letter or phone within 4 business days after the clinic has received the results. If you do not hear from us within 7 days, please contact the clinic through MyChart or phone. If you have a critical or abnormal lab result, we will notify you by phone as soon as possible.  Submit refill requests through [employer] or call your pharmacy and they will forward the refill request to us. Please allow 3 business days for your refill to be  "completed.          Additional Information About Your Visit        [email or web address] Information     [employer] gives you secure access to your electronic health record. If you see a primary care provider, you can also send messages to your care team and make appointments. If you have questions, please call your primary care clinic.  If you do not have a primary care provider, please call [phone] and they will assist you.        Care EveryWhere ID     This is your Care EveryWhere ID. This could be used by other organizations to access your [place] medical records  [id number]        Your Vitals Were     Pulse Temperature Height Last Period Pulse Oximetry Breastfeeding?    65 97.6  F (36.4  C) (Oral) 1.588 m (5' 2.5\") (LMP Unknown) 98% No    BMI (Body Mass Index)                   32.47 kg/m2            Blood Pressure from Last 3 Encounters:   08/01/17 168/86   05/25/17 139/89   12/30/16 138/69    Weight from Last 3 Encounters:   08/01/17 81.8 kg (180 lb 6.4 oz)   12/27/16 81.6 kg (180 lb)   12/15/16 82.1 kg (181 lb)              We Performed the Following     Comprehensive metabolic panel     Lipid Profile     PNEUMOCOCCAL CONJ VACCINE 13 VALENT IM     Sleep Study Referral     TDAP VACCINE (BOOSTRIX)     VACCINE ADMINISTRATION, EACH ADDITIONAL     VACCINE ADMINISTRATION, INITIAL     VENOUS COLLECTION          Today's Medication Changes          These changes are accurate as of: 8/1/17 12:24 PM.  If you have any questions, ask your nurse or doctor.               Start taking these medicines.        Dose/Directions    hydrochlorothiazide 25 MG tablet   Commonly known as:  HYDRODIURIL   Used for:  Essential hypertension, benign   Started by:  Barbra Puga PA        Dose:  25 mg   Take 1 tablet (25 mg) by mouth daily   Quantity:  90 tablet   Refills:  0         These medicines have changed or have updated prescriptions.        Dose/Directions    amLODIPine 5 MG tablet   Commonly known as:  NORVASC   This may " have changed:  See the new instructions.   Used for:  Essential hypertension, benign   Changed by:  Barbra Puga PA        Dose:  5 mg   Take 1 tablet (5 mg) by mouth daily   Quantity:  90 tablet   Refills:  0       omeprazole 20 MG CR capsule   Commonly known as:  priLOSEC   This may have changed:  See the new instructions.   Used for:  Gastroesophageal reflux disease without esophagitis   Changed by:  Barbra Puga PA        TAKE 1 CAPSULE BY MOUTH DAILY   Quantity:  90 capsule   Refills:  3       pravastatin 20 MG tablet   Commonly known as:  PRAVACHOL   This may have changed:  See the new instructions.   Used for:  Mixed hyperlipidemia   Changed by:  Barbra Puga PA        TAKE 1 TABELT BY MOUTH EVERY NIGHT AT BEDTIME   Quantity:  90 tablet   Refills:  3            Where to get your medicines      These medications were sent to [id number] - [place], [place] - [address]  [address], [place] [address]     Phone:  [phone]     amLODIPine 5 MG tablet    buPROPion 150 MG 12 hr tablet    hydrochlorothiazide 25 MG tablet    omeprazole 20 MG CR capsule    pravastatin 20 MG tablet                Primary Care Provider Office Phone # Fax #    TEENA Gastelum [phone]       [facility]  [address] [facility] [address]        Equal Access to Services     MAURISIO BLANCO AH: Hadii aad ku hadasho Soomaali, waaxda luqadaha, qaybta kaalmada adeegyada, waxay idiin hayaan chi sorensen . So [facility] [phone].    ATENCIÓN: Si habla español, tiene a green disposición servicios gratuitos de asistencia lingüística. Llame al [phone].    We comply with applicable federal civil rights laws and [place] laws. We do not discriminate on the basis of race, color, national origin, age, disability sex, sexual orientation or gender identity.            Thank you!     Thank you for choosing [facility]  for your care. Our goal is always to provide you with excellent care. Hearing back from our patients is one way we can continue to improve our services. Please take a few minutes to complete the written survey that you may receive in the mail after your visit with us. Thank you!             Your Updated Medication List - Protect others around you: Learn how to safely use, store and throw away your medicines at www.disposemymeds.org.          This list is accurate as of: 8/1/17 12:24 PM.  Always use your most recent med list.                   Brand Name Dispense Instructions for use Diagnosis    amLODIPine 5 MG tablet    NORVASC    90 tablet    Take 1 tablet (5 mg) by mouth daily    Essential hypertension, benign       aspirin 81 MG tablet      Take 81 mg by mouth daily        buPROPion 150 MG 12 hr tablet    WELLBUTRIN SR    90 tablet    Take 1 tablet (150 mg) by mouth daily    Adjustment disorder with mixed anxiety and depressed mood       cetirizine 10 MG tablet    zyrTEC     Take 5 mg by mouth daily        hydrochlorothiazide 25 MG tablet    HYDRODIURIL    90 tablet    Take 1 tablet (25 mg) by mouth daily    Essential hypertension, benign       ketoconazole 2 % cream    NIZORAL    60 g    Apply to AA BID PRN    Dermatitis, seborrheic       omeprazole 20 MG CR capsule    priLOSEC    90 capsule    TAKE 1 CAPSULE BY MOUTH DAILY    Gastroesophageal reflux disease without esophagitis       pravastatin 20 MG tablet    PRAVACHOL    90 tablet    TAKE 1 TABELT BY MOUTH EVERY NIGHT AT BEDTIME    Mixed hyperlipidemia

## 2017-08-01 NOTE — PROGRESS NOTES
SUBJECTIVE:     CC: Keyonna Garcia is an 67 year old woman who presents for preventive health visit.     Healthy Habits:      Amount of exercise or daily activities, outside of work: several days week    Problems taking medications regularly No    Medication side effects: No    Have you had an eye exam in the past two years?  A couple of years    Do you see a dentist twice per year? yes      Colonoscopy  - DECLINED - FOB given  DEXA - DUE IN 2022  Pneumovax - GIVEN  Falls - DONE    GERD - Prilosec daily    Restarted Wellbutrin 2 weeks ago  No AE   Takin 150 in am         Hyperlipidemia Follow-Up      Rate your low fat/cholesterol diet?: good    Taking statin?  No    Other lipid medications/supplements?:  none    Hypertension Follow-up      Outpatient blood pressures at home 150/80s    Low Salt Diet: no added salt    BP Readings from Last 6 Encounters:   08/01/17 168/86   05/25/17 139/89   12/30/16 138/69   12/15/16 120/72   07/26/16 136/84   06/28/16 145/75       Swimming 2-3 days a week     Wt Readings from Last 5 Encounters:   08/01/17 81.8 kg (180 lb 6.4 oz)   12/27/16 81.6 kg (180 lb)   12/15/16 82.1 kg (181 lb)   07/26/16 79.1 kg (174 lb 6.4 oz)   06/28/16 79.5 kg (175 lb 3.2 oz)               Today's PHQ-2 Score: No flowsheet data found.      Social History   Substance Use Topics     Smoking status: Former Smoker     Packs/day: 1.00     Years: 15.00     Types: Cigarettes     Quit date: 12/1/2016     Smokeless tobacco: Never Used     Alcohol use 0.0 oz/week     0 Standard drinks or equivalent per week      Comment: 1 drink monthly     The patient does not drink >3 drinks per day nor >7 drinks per week.    Recent Labs   Lab Test  06/28/16   0815  01/23/16   0927   CHOL  214*  196   HDL  72  75   LDL  118  103   TRIG  122  89   CHOLHDLRATIO  3.0  2.6         Reviewed orders with patient.  Reviewed health maintenance and updated orders accordingly - Yes  Labs reviewed in EPIC  BP Readings from Last 3 Encounters:    08/01/17 168/86   05/25/17 139/89   12/30/16 138/69    Wt Readings from Last 3 Encounters:   08/01/17 81.8 kg (180 lb 6.4 oz)   12/27/16 81.6 kg (180 lb)   12/15/16 82.1 kg (181 lb)                  Patient Active Problem List   Diagnosis     Essential hypertension, benign     Mixed hyperlipidemia     Adjustment disorder with mixed anxiety and depressed mood     Health Care Home     Gastroesophageal reflux disease without esophagitis     ACP (advance care planning)     Seborrheic dermatitis     S/P total hip arthroplasty     Aftercare following right hip joint replacement surgery     Presence of right hip implant     History of basal cell carcinoma     Osteopenia, repeat DEXA 2022     Past Surgical History:   Procedure Laterality Date     ARTHROPLASTY HIP Right 12/27/2016    Procedure: ARTHROPLASTY HIP;  Surgeon: Rolando Baeza MD;  Location: [facility] OR     FINGER SURGERY  2008          ORTHOPEDIC SURGERY  2008    left finger surgery     SALPINGO-OOPHORECTOMY, COMBINED  1973    Right ovary removed benign cyst       Social History   Substance Use Topics     Smoking status: Former Smoker     Packs/day: 1.00     Years: 15.00     Types: Cigarettes     Quit date: 12/1/2016     Smokeless tobacco: Never Used     Alcohol use 0.0 oz/week     0 Standard drinks or equivalent per week      Comment: 1 drink monthly     Family History   Problem Relation Age of Onset     Hypertension Mother      Lipids Mother      Osteoarthritis Mother      Hypertension Father      Lipids Father      Osteoarthritis Father      C.A.D. No family hx of      DIABETES No family hx of      Breast Cancer No family hx of      Cancer - colorectal No family hx of          Current Outpatient Prescriptions   Medication Sig Dispense Refill     omeprazole (PRILOSEC) 20 MG CR capsule TAKE 1 CAPSULE BY MOUTH DAILY 90 capsule 3     amLODIPine (NORVASC) 5 MG tablet Take 1 tablet (5 mg) by mouth daily 90 tablet 0     pravastatin (PRAVACHOL) 20 MG tablet TAKE 1 TABELT  BY MOUTH EVERY NIGHT AT BEDTIME 90 tablet 3     buPROPion (WELLBUTRIN SR) 150 MG 12 hr tablet Take 1 tablet (150 mg) by mouth daily 90 tablet 3     hydrochlorothiazide (HYDRODIURIL) 25 MG tablet Take 1 tablet (25 mg) by mouth daily 90 tablet 0     aspirin 81 MG tablet Take 81 mg by mouth daily       ketoconazole (NIZORAL) 2 % cream Apply to AA BID PRN 60 g 3     cetirizine (ZYRTEC) 10 MG tablet Take 5 mg by mouth daily       [DISCONTINUED] buPROPion (WELLBUTRIN SR) 150 MG 12 hr tablet Take 150 mg by mouth daily       [DISCONTINUED] amLODIPine (NORVASC) 5 MG tablet TAKE 1 TABLET BY MOUTH ONCE DAILY  30 tablet 0     [DISCONTINUED] pravastatin (PRAVACHOL) 20 MG tablet TAKE 1 TABELT BY MOUTH EVERY NIGHT AT BEDTIME  30 tablet 0     Allergies   Allergen Reactions     Simvastatin Cramps               Mammo Decision Support:  Patient over age 50, mutual decision to screen reflected in health maintenance.      Pertinent mammograms are reviewed under the imaging tab.  History of abnormal Pap smear: NO - age 67 - see link Cervical Cytology Screening Guidelines    All Histories reviewed and updated in Epic.  Past Medical History:   Diagnosis Date     Arthritis 2008    left finger surgery     Basal cell carcinoma      Essential hypertension, benign 7/7/2011     Mixed hyperlipidemia 7/7/2011     Tobacco use disorder 7/7/2011      Past Surgical History:   Procedure Laterality Date     ARTHROPLASTY HIP Right 12/27/2016    Procedure: ARTHROPLASTY HIP;  Surgeon: Rolando Baeza MD;  Location: [facility] OR     FINGER SURGERY  2008          ORTHOPEDIC SURGERY  2008    left finger surgery     SALPINGO-OOPHORECTOMY, COMBINED  1973    Right ovary removed benign cyst       ROS:  C: NEGATIVE for fever, chills, change in weight  I: NEGATIVE for worrisome rashes, moles or lesions  E: NEGATIVE for vision changes or irritation  ENT: NEGATIVE for ear, mouth and throat problems  R: NEGATIVE for significant cough or SOB - DOES HAVE OCC APNEA  B:  NEGATIVE for masses, tenderness or discharge  CV: NEGATIVE for chest pain, palpitations or peripheral edema  GI: NEGATIVE for nausea, abdominal pain, heartburn, or change in bowel habits  : NEGATIVE for unusual urinary or vaginal symptoms. Periods are absent  M: NEGATIVE for significant arthralgias or myalgia  N: NEGATIVE for weakness, dizziness or paresthesias  P: NEGATIVE for changes in mood or affect    Problem list, Medication list, Allergies, and Medical/Social/Surgical histories reviewed in [facility] and updated as appropriate.  Labs reviewed in EPIC  BP Readings from Last 3 Encounters:   08/01/17 168/86   05/25/17 139/89   12/30/16 138/69    Wt Readings from Last 3 Encounters:   08/01/17 81.8 kg (180 lb 6.4 oz)   12/27/16 81.6 kg (180 lb)   12/15/16 82.1 kg (181 lb)                  Patient Active Problem List   Diagnosis     Essential hypertension, benign     Mixed hyperlipidemia     Adjustment disorder with mixed anxiety and depressed mood     Health Care Home     Gastroesophageal reflux disease without esophagitis     ACP (advance care planning)     Seborrheic dermatitis     S/P total hip arthroplasty     Aftercare following right hip joint replacement surgery     Presence of right hip implant     History of basal cell carcinoma     Osteopenia, repeat DEXA 2022     Past Surgical History:   Procedure Laterality Date     ARTHROPLASTY HIP Right 12/27/2016    Procedure: ARTHROPLASTY HIP;  Surgeon: Rolando Baeza MD;  Location: [facility] OR     FINGER SURGERY  2008          ORTHOPEDIC SURGERY  2008    left finger surgery     SALPINGO-OOPHORECTOMY, COMBINED  1973    Right ovary removed benign cyst       Social History   Substance Use Topics     Smoking status: Former Smoker     Packs/day: 1.00     Years: 15.00     Types: Cigarettes     Quit date: 12/1/2016     Smokeless tobacco: Never Used     Alcohol use 0.0 oz/week     0 Standard drinks or equivalent per week      Comment: 1 drink monthly     Family History   Problem  Relation Age of Onset     Hypertension Mother      Lipids Mother      Osteoarthritis Mother      Hypertension Father      Lipids Father      Osteoarthritis Father      C.A.D. No family hx of      DIABETES No family hx of      Breast Cancer No family hx of      Cancer - colorectal No family hx of          Current Outpatient Prescriptions   Medication Sig Dispense Refill     omeprazole (PRILOSEC) 20 MG CR capsule TAKE 1 CAPSULE BY MOUTH DAILY 90 capsule 3     amLODIPine (NORVASC) 5 MG tablet Take 1 tablet (5 mg) by mouth daily 90 tablet 0     pravastatin (PRAVACHOL) 20 MG tablet TAKE 1 TABELT BY MOUTH EVERY NIGHT AT BEDTIME 90 tablet 3     buPROPion (WELLBUTRIN SR) 150 MG 12 hr tablet Take 1 tablet (150 mg) by mouth daily 90 tablet 3     hydrochlorothiazide (HYDRODIURIL) 25 MG tablet Take 1 tablet (25 mg) by mouth daily 90 tablet 0     aspirin 81 MG tablet Take 81 mg by mouth daily       ketoconazole (NIZORAL) 2 % cream Apply to AA BID PRN 60 g 3     cetirizine (ZYRTEC) 10 MG tablet Take 5 mg by mouth daily       [DISCONTINUED] buPROPion (WELLBUTRIN SR) 150 MG 12 hr tablet Take 150 mg by mouth daily       [DISCONTINUED] amLODIPine (NORVASC) 5 MG tablet TAKE 1 TABLET BY MOUTH ONCE DAILY  30 tablet 0     [DISCONTINUED] pravastatin (PRAVACHOL) 20 MG tablet TAKE 1 TABELT BY MOUTH EVERY NIGHT AT BEDTIME  30 tablet 0     Allergies   Allergen Reactions     Simvastatin Cramps     Recent Labs   Lab Test  12/29/16   0603  12/15/16   1805  12/01/16   1430  06/28/16   0815  01/23/16   0927  07/14/15   0841  12/05/14   0936  11/09/13   0943   05/14/12   1910   LDL   --    --    --   118  103  113  105  115   < >  118   HDL   --    --    --   72  75  75  77  85   < >  99   TRIG   --    --    --   122  89  121  163*  151*   < >  79   ALT   --    --    --    --   18   --   19  17   < >  16   CR  0.78  0.83  0.78  0.91  0.95  0.98  0.97  0.92   < >  0.87   GFRESTIMATED  74  75  74  67  64  61  63  67   < >  73   GFRESTBLACK  89   --    "89   --    --    --    --    --    --    --    POTASSIUM   --   3.7   --   4.2  4.4  4.3  4.1  4.4   < >  3.8   TSH   --    --    --    --    --    --    --    --    --   4.07    < > = values in this interval not displayed.      OBJECTIVE:     /86 (BP Location: Left arm, Patient Position: Chair, Cuff Size: Adult Large)  Pulse 65  Temp 97.6  F (36.4  C) (Oral)  Ht 1.588 m (5' 2.5\")  Wt 81.8 kg (180 lb 6.4 oz)  LMP  (LMP Unknown)  SpO2 98%  Breastfeeding? No  BMI 32.47 kg/m2  EXAM:  GENERAL: healthy, alert and no distress  EYES: Eyes grossly normal to inspection, PERRL and conjunctivae and sclerae normal  HENT: ear canals and TM's normal, nose and mouth without ulcers or lesions  NECK: no adenopathy, no asymmetry, masses, or scars and thyroid normal to palpation  RESP: lungs clear to auscultation - no rales, rhonchi or wheezes  BREAST: normal without masses, tenderness or nipple discharge and no palpable axillary masses or adenopathy  CV: regular rate and rhythm, normal S1 S2, no S3 or S4, no murmur, click or rub, no peripheral edema and peripheral pulses strong  ABDOMEN: soft, nontender, no hepatosplenomegaly, no masses and bowel sounds normal   (female): normal female external genitalia, normal urethral meatus, vaginal mucosa pink, moist, well rugated, and normal cervix/adnexa/uterus without masses or discharge  (os not visualized)  MS: no gross musculoskeletal defects noted, no edema  SKIN: no suspicious lesions or rashes  NEURO: Normal strength and tone, mentation intact and speech normal  PSYCH: mentation appears normal, affect normal/bright    ASSESSMENT/PLAN:     1. Encounter for gynecological examination without abnormal finding    - VENOUS COLLECTION    2. Essential hypertension, benign  Not controlled.  Add HCTZ  I reviewed the patient information handout from Up To Date on this medication including side effects with the patient.    - Comprehensive metabolic panel  - VENOUS COLLECTION  - " amLODIPine (NORVASC) 5 MG tablet; Take 1 tablet (5 mg) by mouth daily  Dispense: 90 tablet; Refill: 0  - hydrochlorothiazide (HYDRODIURIL) 25 MG tablet; Take 1 tablet (25 mg) by mouth daily  Dispense: 90 tablet; Refill: 0    3. Mixed hyperlipidemia    - VENOUS COLLECTION  - Lipid Profile  - pravastatin (PRAVACHOL) 20 MG tablet; TAKE 1 TABELT BY MOUTH EVERY NIGHT AT BEDTIME  Dispense: 90 tablet; Refill: 3    4. Gastroesophageal reflux disease without esophagitis    - omeprazole (PRILOSEC) 20 MG CR capsule; TAKE 1 CAPSULE BY MOUTH DAILY  Dispense: 90 capsule; Refill: 3    5. History of basal cell carcinoma      6. Osteopenia, repeat DEXA 2022      7. Need for vaccination    - VACCINE ADMINISTRATION, INITIAL  - VACCINE ADMINISTRATION, EACH ADDITIONAL  - TDAP VACCINE (BOOSTRIX)  - PNEUMOCOCCAL CONJ VACCINE 13 VALENT IM    8. Encounter for screening mammogram for breast cancer    - Mammo Screening digital (bilat); Future    9. Adjustment disorder with mixed anxiety and depressed mood    - buPROPion (WELLBUTRIN SR) 150 MG 12 hr tablet; Take 1 tablet (150 mg) by mouth daily  Dispense: 90 tablet; Refill: 3    10. Apnea    - Sleep Study Referral    COUNSELING:     Special attention given to:        Regular exercise       Healthy diet/nutrition       Vision screening       Hearing screening       Immunizations             Alcohol Use            Osteoporosis Prevention/Bone Health       Colon cancer screening       Consider Hep C screening for patients born between 1945 and 1965      Blood Pressure.  BP Readings from Last 6 Encounters:   08/01/17 168/86   05/25/17 139/89   12/30/16 138/69   12/15/16 120/72   07/26/16 136/84   06/28/16 145/75            reports that she quit smoking about 7 months ago. Her smoking use included Cigarettes. She has a 15.00 pack-year smoking history. She has never used smokeless tobacco.    Estimated body mass index is 32.47 kg/(m^2) as calculated from the following:    Height as of this  "encounter: 1.588 m (5' 2.5\").    Weight as of this encounter: 81.8 kg (180 lb 6.4 oz).   Weight management plan: Discussed healthy diet and exercise guidelines and patient will follow up in 1 month in clinic to re-evaluate.       FU 1 MONTH NON-FASTING RECHECK BP    Counseling Resources:  ATP IV Guidelines  Pooled Cohorts Equation Calculator  Breast Cancer Risk Calculator  FRAX Risk Assessment  ICSI Preventive Guidelines  Dietary Guidelines for Americans, 2010  USDA's MyPlate  ASA Prophylaxis  Lung CA Screening    TEENA Gastelum  [facility]    "

## 2017-08-02 LAB
ALBUMIN SERPL-MCNC: 4.5 G/DL (ref 3.6–5.1)
ALBUMIN/GLOB SERPL: 1.6 (CALC) (ref 1–2.5)
ALP SERPL-CCNC: 75 U/L (ref 33–130)
ALT SERPL-CCNC: 20 U/L (ref 6–29)
AST SERPL-CCNC: 24 U/L (ref 10–35)
BILIRUB SERPL-MCNC: 0.4 MG/DL (ref 0.2–1.2)
BUN SERPL-MCNC: 11 MG/DL (ref 7–25)
BUN/CREATININE RATIO: NORMAL (CALC) (ref 6–22)
CALCIUM SERPL-MCNC: 9.4 MG/DL (ref 8.6–10.4)
CHLORIDE SERPLBLD-SCNC: 101 MMOL/L (ref 98–110)
CHOLEST SERPL-MCNC: 208 MG/DL (ref 125–200)
CHOLEST/HDLC SERPL: 3 (CALC)
CO2 SERPL-SCNC: 26 MMOL/L (ref 20–31)
CREAT SERPL-MCNC: 0.79 MG/DL (ref 0.5–0.99)
EGFR AFRICAN AMERICAN - QUEST: 91 ML/MIN/1.73M2
GFR SERPL CREATININE-BSD FRML MDRD: 79 ML/MIN/1.73M2
GLOBULIN, CALCULATED - QUEST: 2.9 G/DL (CALC) (ref 1.9–3.7)
GLUCOSE - QUEST: 96 MG/DL (ref 65–99)
HDLC SERPL-MCNC: 70 MG/DL
LDLC SERPL CALC-MCNC: 106 MG/DL (CALC)
NONHDLC SERPL-MCNC: 138 MG/DL (CALC)
POTASSIUM SERPL-SCNC: 3.9 MMOL/L (ref 3.5–5.3)
PROT SERPL-MCNC: 7.4 G/DL (ref 6.1–8.1)
SODIUM SERPL-SCNC: 140 MMOL/L (ref 135–146)
TRIGL SERPL-MCNC: 160 MG/DL

## 2017-08-02 ASSESSMENT — PATIENT HEALTH QUESTIONNAIRE - PHQ9: SUM OF ALL RESPONSES TO PHQ QUESTIONS 1-9: 3

## 2017-08-02 ASSESSMENT — ANXIETY QUESTIONNAIRES: GAD7 TOTAL SCORE: 7

## 2017-08-03 ENCOUNTER — HOSPITAL ENCOUNTER (OUTPATIENT)
Dept: MAMMOGRAPHY | Facility: CLINIC | Age: 67
Discharge: HOME | End: 2017-08-03
Attending: PHYSICIAN ASSISTANT | Admitting: PHYSICIAN ASSISTANT
Payer: COMMERCIAL

## 2017-08-03 DIAGNOSIS — Z12.31 ENCOUNTER FOR SCREENING MAMMOGRAM FOR BREAST CANCER: ICD-10-CM

## 2017-08-03 PROCEDURE — G0202 SCR MAMMO BI INCL CAD: HCPCS

## 2017-08-08 ENCOUNTER — MYC MEDICAL ADVICE (OUTPATIENT)
Dept: FAMILY MEDICINE | Facility: CLINIC | Age: 67
End: 2017-08-08

## 2017-08-08 NOTE — TELEPHONE ENCOUNTER
From: Keyonna Garcia  To: Barbra Puga PA  Sent: 8/8/2017 1:14 PM CDT  Subject: Question about medications    I was given Bupropium 12 hr instead of 24 hr. Any concerns. I have taken for 2 days and no problems. Please advise. I can continue the 12 hr if no concerns. bone densit flagged as overdue. I believe you said no due until 2022?

## 2017-09-01 DIAGNOSIS — Z12.11 SPECIAL SCREENING FOR MALIGNANT NEOPLASMS, COLON: Primary | ICD-10-CM

## 2017-09-01 LAB — OCCULT BLOOD: NORMAL

## 2017-09-01 PROCEDURE — 82274 ASSAY TEST FOR BLOOD FECAL: CPT | Performed by: PHYSICIAN ASSISTANT

## 2017-09-14 ENCOUNTER — OFFICE VISIT (OUTPATIENT)
Dept: FAMILY MEDICINE | Facility: CLINIC | Age: 67
End: 2017-09-14

## 2017-09-14 VITALS
OXYGEN SATURATION: 97 % | SYSTOLIC BLOOD PRESSURE: 140 MMHG | HEIGHT: 62 IN | DIASTOLIC BLOOD PRESSURE: 80 MMHG | HEART RATE: 72 BPM | TEMPERATURE: 97.9 F | WEIGHT: 182.2 LBS | BODY MASS INDEX: 33.53 KG/M2

## 2017-09-14 DIAGNOSIS — I10 ESSENTIAL HYPERTENSION, BENIGN: Primary | ICD-10-CM

## 2017-09-14 DIAGNOSIS — Z23 NEED FOR VACCINATION: ICD-10-CM

## 2017-09-14 DIAGNOSIS — F43.23 ADJUSTMENT DISORDER WITH MIXED ANXIETY AND DEPRESSED MOOD: ICD-10-CM

## 2017-09-14 PROCEDURE — 99213 OFFICE O/P EST LOW 20 MIN: CPT | Mod: 25 | Performed by: PHYSICIAN ASSISTANT

## 2017-09-14 PROCEDURE — 90471 IMMUNIZATION ADMIN: CPT | Performed by: PHYSICIAN ASSISTANT

## 2017-09-14 PROCEDURE — 80048 BASIC METABOLIC PNL TOTAL CA: CPT | Mod: 90 | Performed by: PHYSICIAN ASSISTANT

## 2017-09-14 PROCEDURE — 90686 IIV4 VACC NO PRSV 0.5 ML IM: CPT | Performed by: PHYSICIAN ASSISTANT

## 2017-09-14 PROCEDURE — 36415 COLL VENOUS BLD VENIPUNCTURE: CPT | Performed by: PHYSICIAN ASSISTANT

## 2017-09-14 RX ORDER — BUPROPION HYDROCHLORIDE 150 MG/1
150 TABLET ORAL EVERY MORNING
Qty: 90 TABLET | Refills: 3 | Status: SHIPPED | OUTPATIENT
Start: 2017-09-14 | End: 2018-07-17

## 2017-09-14 RX ORDER — HYDROCHLOROTHIAZIDE 25 MG/1
25 TABLET ORAL DAILY
Qty: 90 TABLET | Refills: 0 | Status: SHIPPED | OUTPATIENT
Start: 2017-09-14 | End: 2017-11-30

## 2017-09-14 RX ORDER — AMLODIPINE BESYLATE 5 MG/1
5 TABLET ORAL DAILY
Qty: 90 TABLET | Refills: 0 | Status: SHIPPED | OUTPATIENT
Start: 2017-09-14 | End: 2017-11-30

## 2017-09-14 RX ORDER — LISINOPRIL 10 MG/1
10 TABLET ORAL DAILY
Qty: 90 TABLET | Refills: 0 | Status: SHIPPED | OUTPATIENT
Start: 2017-09-14 | End: 2017-10-23 | Stop reason: SINTOL

## 2017-09-14 NOTE — MR AVS SNAPSHOT
"              After Visit Summary   9/14/2017    Keyonna Garcia    MRN: [id number]           Patient Information     Date Of Birth          1950        Visit Information        Provider Department      9/14/2017 5:15 PM Barbra Puga PA [facility]        Today's Diagnoses     Essential hypertension, benign    -  1    Adjustment disorder with mixed anxiety and depressed mood        Need for vaccination           Follow-ups after your visit        Who to contact     If you have questions or need follow up information about today's clinic visit or your schedule please contact [facility] directly at [phone].  Normal or non-critical lab and imaging results will be communicated to you by MyChart, letter or phone within 4 business days after the clinic has received the results. If you do not hear from us within 7 days, please contact the clinic through WhoisEDIhart or phone. If you have a critical or abnormal lab result, we will notify you by phone as soon as possible.  Submit refill requests through [email or web address] or call your pharmacy and they will forward the refill request to us. Please allow 3 business days for your refill to be completed.          Additional Information About Your Visit        MyChart Information     [email or web address] gives you secure access to your electronic health record. If you see a primary care provider, you can also send messages to your care team and make appointments. If you have questions, please call your primary care clinic.  If you do not have a primary care provider, please call [phone] and they will assist you.        Care EveryWhere ID     This is your Care EveryWhere ID. This could be used by other organizations to access your [facility] medical records  [id number]        Your Vitals Were     Pulse Temperature Height Last Period Pulse Oximetry BMI (Body Mass Index)    72 97.9  F (36.6  C) (Oral) 1.581 m (5' 2.25\") (LMP Unknown) " 97% 33.06 kg/m2       Blood Pressure from Last 3 Encounters:   09/14/17 140/80   08/01/17 168/86   05/25/17 139/89    Weight from Last 3 Encounters:   09/14/17 82.6 kg (182 lb 3.2 oz)   08/01/17 81.8 kg (180 lb 6.4 oz)   12/27/16 81.6 kg (180 lb)              We Performed the Following     BASIC METABOLIC PANEL (QUEST)     HC FLU VAC PRESRV FREE QUAD SPLIT VIR 3+YRS IM     VACCINE ADMINISTRATION, INITIAL     VENOUS COLLECTION          Today's Medication Changes          These changes are accurate as of: 9/14/17 11:59 PM.  If you have any questions, ask your nurse or doctor.               Start taking these medicines.        Dose/Directions    buPROPion 150 MG 24 hr tablet   Commonly known as:  WELLBUTRIN XL   Used for:  Adjustment disorder with mixed anxiety and depressed mood   Replaces:  buPROPion 150 MG 12 hr tablet   Started by:  Barbra Puga PA        Dose:  150 mg   Take 1 tablet (150 mg) by mouth every morning   Quantity:  90 tablet   Refills:  3       lisinopril 10 MG tablet   Commonly known as:  PRINIVIL/ZESTRIL   Used for:  Essential hypertension, benign   Started by:  Barbra Puga PA        Dose:  10 mg   Take 1 tablet (10 mg) by mouth daily   Quantity:  90 tablet   Refills:  0         Stop taking these medicines if you haven't already. Please contact your care team if you have questions.     buPROPion 150 MG 12 hr tablet   Commonly known as:  WELLBUTRIN SR   Replaced by:  buPROPion 150 MG 24 hr tablet   Stopped by:  Barbra Puga PA                Where to get your medicines      These medications were sent to [id number] - [place], [place] - [address]  [address] , [place] [address]     Phone:  [phone]     amLODIPine 5 MG tablet    buPROPion 150 MG 24 hr tablet    hydrochlorothiazide 25 MG tablet    lisinopril 10 MG tablet                Primary Care Provider Office Phone # Fax #    TEENA Gastelum [phone]  [phone]       [address] [facility] [address]        Equal Access to Services     TAMMYMAURISIO BELLA : Hadii aad ku hadnatachao Sonikoleali, waaxda luqadaha, qaybta kaalmada adeivoryda, abril chaimin hayaadave obriencory rome laHaaan skip. So [facility] [phone].    ATENCIÓN: Si habla español, tiene a green disposición servicios gratuitos de asistencia lingüística. Llame al [phone].    We comply with applicable federal civil rights laws and [place] laws. We do not discriminate on the basis of race, color, national origin, age, disability sex, sexual orientation or gender identity.            Thank you!     Thank you for choosing [facility]  for your care. Our goal is always to provide you with excellent care. Hearing back from our patients is one way we can continue to improve our services. Please take a few minutes to complete the written survey that you may receive in the mail after your visit with us. Thank you!             Your Updated Medication List - Protect others around you: Learn how to safely use, store and throw away your medicines at www.disposemymeds.org.          This list is accurate as of: 9/14/17 11:59 PM.  Always use your most recent med list.                   Brand Name Dispense Instructions for use Diagnosis    amLODIPine 5 MG tablet    NORVASC    90 tablet    Take 1 tablet (5 mg) by mouth daily    Essential hypertension, benign       aspirin 81 MG tablet      Take 81 mg by mouth daily        buPROPion 150 MG 24 hr tablet    WELLBUTRIN XL    90 tablet    Take 1 tablet (150 mg) by mouth every morning    Adjustment disorder with mixed anxiety and depressed mood       cetirizine 10 MG tablet    zyrTEC     Take 5 mg by mouth daily        hydrochlorothiazide 25 MG tablet    HYDRODIURIL    90 tablet    Take 1 tablet (25 mg) by mouth daily    Essential hypertension, benign       ketoconazole 2 % cream    NIZORAL    60 g    Apply to AA BID PRN    Dermatitis, seborrheic       lisinopril  10 MG tablet    PRINIVIL/ZESTRIL    90 tablet    Take 1 tablet (10 mg) by mouth daily    Essential hypertension, benign       omeprazole 20 MG CR capsule    priLOSEC    90 capsule    TAKE 1 CAPSULE BY MOUTH DAILY    Gastroesophageal reflux disease without esophagitis       pravastatin 20 MG tablet    PRAVACHOL    90 tablet    TAKE 1 TABELT BY MOUTH EVERY NIGHT AT BEDTIME    Mixed hyperlipidemia

## 2017-09-14 NOTE — PROGRESS NOTES
SUBJECTIVE:                                                    Keyonna Garcia is a 67 year old female who presents to clinic today for the following health issues:      Hypertension Follow-up      Outpatient blood pressures are being checked at home.  Results are 150s/80s.    Low Salt Diet: no added salt        Amount of exercise or physical activity: swimming a couple days a week     Problems taking medications regularly: No    Medication side effects: has noticed increased fatigue since starting HCTZ and switching to SR dose of Wellbutrin vs. XR    Diet: regular (no restrictions)        ROS:   C: NEGATIVE for fever, chills, change in weight  E: NEGATIVE for vision changes or irritation  E/M: NEGATIVE for ear, mouth and throat problems  R: NEGATIVE for significant cough or SOB  CV: NEGATIVE for chest pain, palpitations or peripheral edema          Labs reviewed in EPIC  BP Readings from Last 3 Encounters:   09/14/17 140/80   08/01/17 168/86   05/25/17 139/89    Wt Readings from Last 3 Encounters:   09/14/17 82.6 kg (182 lb 3.2 oz)   08/01/17 81.8 kg (180 lb 6.4 oz)   12/27/16 81.6 kg (180 lb)                  Patient Active Problem List   Diagnosis     Essential hypertension, benign     Mixed hyperlipidemia     Adjustment disorder with mixed anxiety and depressed mood     Health Care Home     Gastroesophageal reflux disease without esophagitis     ACP (advance care planning)     Seborrheic dermatitis     S/P total hip arthroplasty     Aftercare following right hip joint replacement surgery     Presence of right hip implant     History of basal cell carcinoma     Osteopenia, repeat DEXA 2022     Past Surgical History:   Procedure Laterality Date     ARTHROPLASTY HIP Right 12/27/2016    Procedure: ARTHROPLASTY HIP;  Surgeon: Rolando Baeza MD;  Location: [facility] OR     FINGER SURGERY  2008          ORTHOPEDIC SURGERY  2008    left finger surgery     SALPINGO-OOPHORECTOMY, COMBINED  1973    Right ovary removed benign  cyst       Social History   Substance Use Topics     Smoking status: Former Smoker     Packs/day: 1.00     Years: 15.00     Types: Cigarettes     Quit date: 12/1/2016     Smokeless tobacco: Never Used     Alcohol use 0.0 oz/week     0 Standard drinks or equivalent per week      Comment: 1 drink monthly     Family History   Problem Relation Age of Onset     Hypertension Mother      Lipids Mother      Osteoarthritis Mother      Hypertension Father      Lipids Father      Osteoarthritis Father      C.A.D. No family hx of      DIABETES No family hx of      Breast Cancer No family hx of      Cancer - colorectal No family hx of          Current Outpatient Prescriptions   Medication Sig Dispense Refill     buPROPion (WELLBUTRIN XL) 150 MG 24 hr tablet Take 1 tablet (150 mg) by mouth every morning 90 tablet 3     hydrochlorothiazide (HYDRODIURIL) 25 MG tablet Take 1 tablet (25 mg) by mouth daily 90 tablet 0     amLODIPine (NORVASC) 5 MG tablet Take 1 tablet (5 mg) by mouth daily 90 tablet 0     lisinopril (PRINIVIL/ZESTRIL) 10 MG tablet Take 1 tablet (10 mg) by mouth daily 90 tablet 0     omeprazole (PRILOSEC) 20 MG CR capsule TAKE 1 CAPSULE BY MOUTH DAILY 90 capsule 3     pravastatin (PRAVACHOL) 20 MG tablet TAKE 1 TABELT BY MOUTH EVERY NIGHT AT BEDTIME 90 tablet 3     aspirin 81 MG tablet Take 81 mg by mouth daily       ketoconazole (NIZORAL) 2 % cream Apply to AA BID PRN 60 g 3     cetirizine (ZYRTEC) 10 MG tablet Take 5 mg by mouth daily       Allergies   Allergen Reactions     Simvastatin Cramps     Recent Labs   Lab Test  09/14/17   1730  08/01/17   1124  12/29/16   0603   12/01/16   1430  06/28/16   0815  01/23/16   0927   12/05/14   0936   05/14/12   1910   LDL   --   106   --    --    --   118  103   < >  105   < >  118   HDL   --   70   --    --    --   72  75   < >  77   < >  99   TRIG   --   160*   --    --    --   122  89   < >  163*   < >  79   ALT   --   20   --    --    --    --   18   --   19   < >  16   CR  " 1.08*  0.79  0.78   < >  0.78  0.91  0.95   < >  0.97   < >  0.87   GFRESTIMATED  54*  79  74   < >  74  67  64   < >  63   < >  73   GFRESTBLACK   --    --   89   --   89   --    --    --    --    --    --    POTASSIUM  3.7  3.9   --    < >   --   4.2  4.4   < >  4.1   < >  3.8   TSH   --    --    --    --    --    --    --    --    --    --   4.07    < > = values in this interval not displayed.              OBJECTIVE:   /80 (BP Location: Left arm, Patient Position: Chair, Cuff Size: Adult Regular)  Pulse 72  Temp 97.9  F (36.6  C) (Oral)  Ht 1.581 m (5' 2.25\")  Wt 82.6 kg (182 lb 3.2 oz)  LMP  (LMP Unknown)  SpO2 97%  BMI 33.06 kg/m2   Body mass index is 33.06 kg/(m^2).       GENERAL: healthy, alert and no distress  HEAD: Normocephalic, atraumatic  EYES: Eyes grossly normal to inspection, extraocular movements - intact in all directions. No discharge  EARS: canals- normal; TMs- normal  NOSE:  Nasal mucosa pink and moist. No abnormal discharge.  MOUTH:   Mucous membranes moist.  Pharynx non-erythematous, no exudates. No ulcers, no lesions  NECK: no tenderness, no adenopathy,  no masses, no stiffness; thyroid- normal to palpation  RESP: lungs clear to auscultation - no rales, no rhonchi, no wheezes  CV: regular rates and rhythm, normal S1 S2, no S3 or S4 and no murmur, no click or rub   MS: extremities- no gross deformities noted  NEURO: strength and tone- normal, sensory exam- grossly normal, mentation- intact, speech- normal          ASSESSMENT/PLAN:                                                    1. Essential hypertension, benign    - hydrochlorothiazide (HYDRODIURIL) 25 MG tablet; Take 1 tablet (25 mg) by mouth daily  Dispense: 90 tablet; Refill: 0  - amLODIPine (NORVASC) 5 MG tablet; Take 1 tablet (5 mg) by mouth daily  Dispense: 90 tablet; Refill: 0  - lisinopril (PRINIVIL/ZESTRIL) 10 MG tablet; Take 1 tablet (10 mg) by mouth daily  Dispense: 90 tablet; Refill: 0  - BASIC METABOLIC PANEL " (QUEST)  - VENOUS COLLECTION    Added lisinopril.  I reviewed the patient information handout from Up To Date on this medication including side effects with the patient.      2. Adjustment disorder with mixed anxiety and depressed mood  Switch back to XR  - buPROPion (WELLBUTRIN XL) 150 MG 24 hr tablet; Take 1 tablet (150 mg) by mouth every morning  Dispense: 90 tablet; Refill: 3    Follow Up: 6-8 weeks  Start Vit D 1,000 IU D3 daily.       Barbra Puga PA-C  [facility]

## 2017-09-16 LAB
BUN SERPL-MCNC: 20 MG/DL (ref 7–25)
BUN/CREATININE RATIO: 19 (CALC) (ref 6–22)
CALCIUM SERPL-MCNC: 9.7 MG/DL (ref 8.6–10.4)
CHLORIDE SERPLBLD-SCNC: 101 MMOL/L (ref 98–110)
CO2 SERPL-SCNC: 27 MMOL/L (ref 20–31)
CREAT SERPL-MCNC: 1.08 MG/DL (ref 0.5–0.99)
EGFR AFRICAN AMERICAN - QUEST: 62 ML/MIN/1.73M2
GFR SERPL CREATININE-BSD FRML MDRD: 54 ML/MIN/1.73M2
GLUCOSE - QUEST: 87 MG/DL (ref 65–99)
POTASSIUM SERPL-SCNC: 3.7 MMOL/L (ref 3.5–5.3)
SODIUM SERPL-SCNC: 141 MMOL/L (ref 135–146)

## 2017-10-12 ENCOUNTER — TELEPHONE (OUTPATIENT)
Dept: FAMILY MEDICINE | Facility: CLINIC | Age: 67
End: 2017-10-12

## 2017-10-12 NOTE — TELEPHONE ENCOUNTER
Patient called stating that ever since starting the Lisinopril she has had a bad cough and would like a different medication.   Please review and advise.    Thank You,  Pau Montiel CMA    Patient Call Back Info:    Home Phone [phone]   Mobile [phone]

## 2017-10-12 NOTE — TELEPHONE ENCOUNTER
Called pt and left message  Stop lisinopril  Call in week with update.    If cough resolves will start alt medication.    Pt to call me in one week with update.    Barbra Puga PA-C  10/12/2017

## 2017-10-21 ENCOUNTER — TELEPHONE (OUTPATIENT)
Dept: FAMILY MEDICINE | Facility: CLINIC | Age: 67
End: 2017-10-21

## 2017-10-21 NOTE — TELEPHONE ENCOUNTER
Please call pt to see if cough stopped after she stopped lisinopril?    If it did, I will start her on another blood pressure medication called losartan. I will send it to the pharmacy.    Have her see me in clinic in 4 weeks.    If cough hasn't improved she should come in for evaluation - schedule appt for early this week.    Barbra Puga PA-C  10/21/2017

## 2017-10-21 NOTE — TELEPHONE ENCOUNTER
LM on VM asking patient to give us a call regarding the cough as to whether or not it has gone away and then follow directions per CER's note

## 2017-10-23 ENCOUNTER — TELEPHONE (OUTPATIENT)
Dept: FAMILY MEDICINE | Facility: CLINIC | Age: 67
End: 2017-10-23

## 2017-10-23 DIAGNOSIS — I10 ESSENTIAL HYPERTENSION, BENIGN: Primary | ICD-10-CM

## 2017-10-23 RX ORDER — LOSARTAN POTASSIUM 50 MG/1
50 TABLET ORAL DAILY
Qty: 90 TABLET | Refills: 0 | Status: SHIPPED | OUTPATIENT
Start: 2017-10-23 | End: 2017-11-30

## 2017-10-23 NOTE — TELEPHONE ENCOUNTER
I have attempted to call Lauren but got her VM.   I did leave a message with the following information from below.   Also let her know if she had any questions to call us on the CS line.     Florence.MONIQUE Knox ([facility])

## 2017-10-23 NOTE — TELEPHONE ENCOUNTER
Patient called and left a msg that has stopped the Lisinopril and the cough has also stopped.    She is wondering if you want to put her on a new BP medication.    If you want to fax in a new Rx please use the pharmacy listed. Then return to me so I may call the pt back at the following number  [phone]  Thank-You,  Ginny

## 2017-10-23 NOTE — TELEPHONE ENCOUNTER
I sent in a prescription for losartan 50 mg.   Have her take it in the am.    See me in 4 weeks IN CLINIC for recheck please.    Barbra Puga PA-C  10/23/2017

## 2017-10-27 NOTE — TELEPHONE ENCOUNTER
Keyonna called to update that the cough has stopped since she stopped taking the Lisinopril and that she has just started the Losartan yesterday. Her blood pressure has been 138/88 at it's highest and she has no concerns at this point and will see Barbra for a recheck in 1 month.    Patient Call Back Info:  [phone] (home) [phone] (work)    Pau Montiel CMA

## 2017-11-30 ENCOUNTER — OFFICE VISIT (OUTPATIENT)
Dept: FAMILY MEDICINE | Facility: CLINIC | Age: 67
End: 2017-11-30

## 2017-11-30 VITALS
WEIGHT: 179.8 LBS | HEART RATE: 68 BPM | OXYGEN SATURATION: 97 % | DIASTOLIC BLOOD PRESSURE: 76 MMHG | SYSTOLIC BLOOD PRESSURE: 138 MMHG | BODY MASS INDEX: 32.62 KG/M2 | TEMPERATURE: 98 F

## 2017-11-30 DIAGNOSIS — I10 ESSENTIAL HYPERTENSION, BENIGN: ICD-10-CM

## 2017-11-30 PROCEDURE — 36415 COLL VENOUS BLD VENIPUNCTURE: CPT | Performed by: PHYSICIAN ASSISTANT

## 2017-11-30 PROCEDURE — 80048 BASIC METABOLIC PNL TOTAL CA: CPT | Mod: 90 | Performed by: PHYSICIAN ASSISTANT

## 2017-11-30 PROCEDURE — 99213 OFFICE O/P EST LOW 20 MIN: CPT | Performed by: PHYSICIAN ASSISTANT

## 2017-11-30 RX ORDER — AMLODIPINE BESYLATE 5 MG/1
5 TABLET ORAL DAILY
Qty: 90 TABLET | Refills: 0 | Status: SHIPPED | OUTPATIENT
Start: 2017-11-30 | End: 2018-01-05

## 2017-11-30 RX ORDER — HYDROCHLOROTHIAZIDE 25 MG/1
25 TABLET ORAL DAILY
Qty: 90 TABLET | Refills: 0 | Status: SHIPPED | OUTPATIENT
Start: 2017-11-30 | End: 2018-01-05

## 2017-11-30 RX ORDER — LOSARTAN POTASSIUM 50 MG/1
100 TABLET ORAL DAILY
Qty: 90 TABLET | Refills: 0 | COMMUNITY
Start: 2017-11-30 | End: 2017-12-12 | Stop reason: DRUGHIGH

## 2017-11-30 NOTE — MR AVS SNAPSHOT
After Visit Summary   11/30/2017    Keyonna Garcia    MRN: [id number]           Patient Information     Date Of Birth          1950        Visit Information        Provider Department      11/30/2017 5:00 PM Barbra Puga PA [facility]        Today's Diagnoses     Essential hypertension, benign           Follow-ups after your visit        Who to contact     If you have questions or need follow up information about today's clinic visit or your schedule please contact [facility] directly at [phone].  Normal or non-critical lab and imaging results will be communicated to you by [employer], letter or phone within 4 business days after the clinic has received the results. If you do not hear from us within 7 days, please contact the clinic through [employer] or phone. If you have a critical or abnormal lab result, we will notify you by phone as soon as possible.  Submit refill requests through [employer] or call your pharmacy and they will forward the refill request to us. Please allow 3 business days for your refill to be completed.          Additional Information About Your Visit        MyChart Information     [employer] gives you secure access to your electronic health record. If you see a primary care provider, you can also send messages to your care team and make appointments. If you have questions, please call your primary care clinic.  If you do not have a primary care provider, please call [phone] and they will assist you.        Care EveryWhere ID     This is your Care EveryWhere ID. This could be used by other organizations to access your [facility] medical records  [id number]        Your Vitals Were     Pulse Temperature Last Period Pulse Oximetry Breastfeeding? BMI (Body Mass Index)    68 98  F (36.7  C) (Oral) (LMP Unknown) 97% No 32.62 kg/m2       Blood Pressure from Last 3 Encounters:   11/30/17 138/72   09/14/17 140/80    08/01/17 168/86    Weight from Last 3 Encounters:   11/30/17 81.6 kg (179 lb 12.8 oz)   09/14/17 82.6 kg (182 lb 3.2 oz)   08/01/17 81.8 kg (180 lb 6.4 oz)              We Performed the Following     BASIC METABOLIC PANEL (QUEST)     VENOUS COLLECTION          Today's Medication Changes          These changes are accurate as of: 11/30/17  6:21 PM.  If you have any questions, ask your nurse or doctor.               These medicines have changed or have updated prescriptions.        Dose/Directions    losartan 50 MG tablet   Commonly known as:  COZAAR   This may have changed:  how much to take   Used for:  Essential hypertension, benign   Changed by:  Barbra Puga PA        Dose:  100 mg   Take 2 tablets (100 mg) by mouth daily   Quantity:  90 tablet   Refills:  0            Where to get your medicines      These medications were sent to [id number] - [place], [place] - [address]  [address], [place] [address]     Phone:  [phone]     amLODIPine 5 MG tablet    hydrochlorothiazide 25 MG tablet                Primary Care Provider Office Phone # Fax #    TEENA Gastelum [phone] [address]  [facility] [address]        Equal Access to Services     CHRISTIN BLANCO AH: Hadii fortunato ku hadasho Soomaali, waaxda luqadaha, qaybta kaalmada adeegyada, waxay chaimin haydanican chi valdivia. So [facility] [phone].    ATENCIÓN: Si habla español, tiene a green disposición servicios gratuitos de asistencia lingüística. Que al [phone].    We comply with applicable federal civil rights laws and [place] laws. We do not discriminate on the basis of race, color, national origin, age, disability, sex, sexual orientation, or gender identity.            Thank you!     Thank you for choosing [facility]  for your care. Our goal is always to provide you with excellent care. Hearing back from our patients is one way we can continue  to improve our services. Please take a few minutes to complete the written survey that you may receive in the mail after your visit with us. Thank you!             Your Updated Medication List - Protect others around you: Learn how to safely use, store and throw away your medicines at www.disposemymeds.org.          This list is accurate as of: 11/30/17  6:21 PM.  Always use your most recent med list.                   Brand Name Dispense Instructions for use Diagnosis    amLODIPine 5 MG tablet    NORVASC    90 tablet    Take 1 tablet (5 mg) by mouth daily    Essential hypertension, benign       aspirin 81 MG tablet      Take 81 mg by mouth daily        buPROPion 150 MG 24 hr tablet    WELLBUTRIN XL    90 tablet    Take 1 tablet (150 mg) by mouth every morning    Adjustment disorder with mixed anxiety and depressed mood       cetirizine 10 MG tablet    zyrTEC     Take 5 mg by mouth daily        hydrochlorothiazide 25 MG tablet    HYDRODIURIL    90 tablet    Take 1 tablet (25 mg) by mouth daily    Essential hypertension, benign       ketoconazole 2 % cream    NIZORAL    60 g    Apply to AA BID PRN    Dermatitis, seborrheic       losartan 50 MG tablet    COZAAR    90 tablet    Take 2 tablets (100 mg) by mouth daily    Essential hypertension, benign       omeprazole 20 MG CR capsule    priLOSEC    90 capsule    TAKE 1 CAPSULE BY MOUTH DAILY    Gastroesophageal reflux disease without esophagitis       pravastatin 20 MG tablet    PRAVACHOL    90 tablet    TAKE 1 TABELT BY MOUTH EVERY NIGHT AT BEDTIME    Mixed hyperlipidemia

## 2017-11-30 NOTE — PROGRESS NOTES
SUBJECTIVE:                                                    Keyonna Garcia is a 67 year old female who presents to clinic today for the following health issues:    Hypertension Follow-up- Added lisinopril  - got cough so switched to losartan        Outpatient blood pressures are being checked at work.  Results are 140s.    Low Salt Diet: no added salt    Has had 2 episodes of dizziness since starting losaratan              BP Readings from Last 6 Encounters:   11/30/17 138/72   09/14/17 140/80   08/01/17 168/86   05/25/17 139/89   12/30/16 138/69   12/15/16 120/72       Body mass index is 32.62 kg/(m^2).    Wt Readings from Last 5 Encounters:   11/30/17 81.6 kg (179 lb 12.8 oz)   09/14/17 82.6 kg (182 lb 3.2 oz)   08/01/17 81.8 kg (180 lb 6.4 oz)   12/27/16 81.6 kg (180 lb)   12/15/16 82.1 kg (181 lb)               ROS:  C: NEGATIVE for fever, chills, change in weight  E/M: NEGATIVE for ear, mouth and throat problems  R: NEGATIVE for significant cough or SOB  CV: NEGATIVE for chest pain, palpitations or peripheral edema        Labs reviewed in EPIC        BP Readings from Last 3 Encounters:   11/30/17 138/72   09/14/17 140/80   08/01/17 168/86    Wt Readings from Last 3 Encounters:   11/30/17 81.6 kg (179 lb 12.8 oz)   09/14/17 82.6 kg (182 lb 3.2 oz)   08/01/17 81.8 kg (180 lb 6.4 oz)                  Patient Active Problem List   Diagnosis     Essential hypertension, benign     Mixed hyperlipidemia     Adjustment disorder with mixed anxiety and depressed mood     Health Care Home     Gastroesophageal reflux disease without esophagitis     ACP (advance care planning)     Seborrheic dermatitis     S/P total hip arthroplasty     Aftercare following right hip joint replacement surgery     Presence of right hip implant     History of basal cell carcinoma     Osteopenia, repeat DEXA 2022     Past Surgical History:   Procedure Laterality Date     ARTHROPLASTY HIP Right 12/27/2016    Procedure: ARTHROPLASTY HIP;   Surgeon: Rolando Baeza MD;  Location: [facility] OR     FINGER SURGERY  2008          ORTHOPEDIC SURGERY  2008    left finger surgery     SALPINGO-OOPHORECTOMY, COMBINED  1973    Right ovary removed benign cyst       Social History   Substance Use Topics     Smoking status: Former Smoker     Packs/day: 1.00     Years: 15.00     Types: Cigarettes     Quit date: 12/1/2016     Smokeless tobacco: Never Used     Alcohol use 0.0 oz/week     0 Standard drinks or equivalent per week      Comment: 1 drink monthly     Family History   Problem Relation Age of Onset     Hypertension Mother      Lipids Mother      Osteoarthritis Mother      Hypertension Father      Lipids Father      Osteoarthritis Father      C.A.D. No family hx of      DIABETES No family hx of      Breast Cancer No family hx of      Cancer - colorectal No family hx of            Current Outpatient Prescriptions   Medication Sig Dispense Refill     losartan (COZAAR) 50 MG tablet Take 1 tablet (50 mg) by mouth daily 90 tablet 0     buPROPion (WELLBUTRIN XL) 150 MG 24 hr tablet Take 1 tablet (150 mg) by mouth every morning 90 tablet 3     hydrochlorothiazide (HYDRODIURIL) 25 MG tablet Take 1 tablet (25 mg) by mouth daily 90 tablet 0     amLODIPine (NORVASC) 5 MG tablet Take 1 tablet (5 mg) by mouth daily 90 tablet 0     omeprazole (PRILOSEC) 20 MG CR capsule TAKE 1 CAPSULE BY MOUTH DAILY 90 capsule 3     pravastatin (PRAVACHOL) 20 MG tablet TAKE 1 TABELT BY MOUTH EVERY NIGHT AT BEDTIME 90 tablet 3     aspirin 81 MG tablet Take 81 mg by mouth daily       ketoconazole (NIZORAL) 2 % cream Apply to AA BID PRN 60 g 3     cetirizine (ZYRTEC) 10 MG tablet Take 5 mg by mouth daily       Allergies   Allergen Reactions     Lisinopril Cough     Simvastatin Cramps         OBJECTIVE:                                                    /72 (BP Location: Left arm, Patient Position: Chair, Cuff Size: Adult Regular)  Pulse 68  Temp 98  F (36.7  C) (Oral)  Wt 81.6 kg (179 lb  12.8 oz)  LMP  (LMP Unknown)  SpO2 97%  Breastfeeding? No  BMI 32.62 kg/m2 Body mass index is 32.62 kg/(m^2).   GENERAL: healthy, alert and no distress  HEAD: Normocephalic, atraumatic  EYES: Eyes grossly normal to inspection, extraocular movements - intact  EARS: External ear and canals canals- normal; TMs- normal  NOSE: No discharge noted  MOUTH/THROAT: no ulcers, no lesions, pharynx non-erythematous, no exudates present, tonsils without hypertrophy, mucous membranes moist.   NECK: no tenderness, no adenopathy, no asymmetry, no masses, no stiffness; thyroid- normal to palpation  RESP: lungs clear to auscultation - no crackles, no rhonchi, no wheezes  CV: regular rates and rhythm, normal S1 S2, no S3 or S4 and no murmur, no click or rub -           ASSESSMENT/PLAN:                                                        ICD-10-CM    1. Essential hypertension, benign I10 hydrochlorothiazide (HYDRODIURIL) 25 MG tablet     amLODIPine (NORVASC) 5 MG tablet     BASIC METABOLIC PANEL (QUEST)     VENOUS COLLECTION     losartan (COZAAR) 50 MG tablet         Inc. Losartan to 100 mg  Monitor for AE  RTC 4-6 weeks recheck    Barbra Puga PA-C  [facility]

## 2017-12-01 LAB
BUN SERPL-MCNC: 18 MG/DL (ref 7–25)
BUN/CREATININE RATIO: 18 (CALC) (ref 6–22)
CALCIUM SERPL-MCNC: 9.8 MG/DL (ref 8.6–10.4)
CHLORIDE SERPLBLD-SCNC: 97 MMOL/L (ref 98–110)
CO2 SERPL-SCNC: 29 MMOL/L (ref 20–31)
CREAT SERPL-MCNC: 1.02 MG/DL (ref 0.5–0.99)
EGFR AFRICAN AMERICAN - QUEST: 67 ML/MIN/1.73M2
GFR SERPL CREATININE-BSD FRML MDRD: 58 ML/MIN/1.73M2
GLUCOSE - QUEST: 102 MG/DL (ref 65–99)
POTASSIUM SERPL-SCNC: 3.9 MMOL/L (ref 3.5–5.3)
SODIUM SERPL-SCNC: 137 MMOL/L (ref 135–146)

## 2017-12-12 DIAGNOSIS — I10 ESSENTIAL HYPERTENSION, BENIGN: ICD-10-CM

## 2017-12-12 RX ORDER — LOSARTAN POTASSIUM 50 MG/1
100 TABLET ORAL DAILY
Qty: 180 TABLET | Refills: 0 | Status: CANCELLED | OUTPATIENT
Start: 2017-12-12

## 2017-12-12 RX ORDER — LOSARTAN POTASSIUM 100 MG/1
100 TABLET ORAL DAILY
Qty: 90 TABLET | Refills: 0 | Status: SHIPPED | OUTPATIENT
Start: 2017-12-12 | End: 2018-01-05

## 2017-12-12 NOTE — TELEPHONE ENCOUNTER
Patient called and said that you increase her dose of the Cozaar to 2 tabs and so now with the increased dose she will need a new rx.     Pending Prescriptions:                       Disp   Refills    losartan (COZAAR) 50 MG tablet            180 ta*0            Sig: Take 2 tablets (100 mg) by mouth daily    Ready to be faxed when Rx is approved or denied.  .Please return to me and I will call the patient back at [phone]. Thank-You,  Ginny

## 2017-12-12 NOTE — TELEPHONE ENCOUNTER
Please call pt  New rx for 100 mg tablet sent - TAKE ONE BY mouth daily.    Barbra Puga PA-C  12/12/2017

## 2018-01-05 ENCOUNTER — OFFICE VISIT (OUTPATIENT)
Dept: FAMILY MEDICINE | Facility: CLINIC | Age: 68
End: 2018-01-05

## 2018-01-05 VITALS
WEIGHT: 181 LBS | BODY MASS INDEX: 32.07 KG/M2 | TEMPERATURE: 98 F | HEART RATE: 78 BPM | DIASTOLIC BLOOD PRESSURE: 84 MMHG | SYSTOLIC BLOOD PRESSURE: 138 MMHG | HEIGHT: 63 IN | OXYGEN SATURATION: 99 %

## 2018-01-05 DIAGNOSIS — I10 ESSENTIAL HYPERTENSION, BENIGN: ICD-10-CM

## 2018-01-05 DIAGNOSIS — E66.9 OBESITY (BMI 30-39.9): Primary | ICD-10-CM

## 2018-01-05 PROCEDURE — 80048 BASIC METABOLIC PNL TOTAL CA: CPT | Mod: 90 | Performed by: PHYSICIAN ASSISTANT

## 2018-01-05 PROCEDURE — 36415 COLL VENOUS BLD VENIPUNCTURE: CPT | Performed by: PHYSICIAN ASSISTANT

## 2018-01-05 PROCEDURE — 99213 OFFICE O/P EST LOW 20 MIN: CPT | Performed by: PHYSICIAN ASSISTANT

## 2018-01-05 RX ORDER — HYDROCHLOROTHIAZIDE 25 MG/1
25 TABLET ORAL DAILY
Qty: 90 TABLET | Refills: 1 | Status: SHIPPED | OUTPATIENT
Start: 2018-01-05 | End: 2018-07-17

## 2018-01-05 RX ORDER — LOSARTAN POTASSIUM 100 MG/1
100 TABLET ORAL DAILY
Qty: 90 TABLET | Refills: 1 | Status: SHIPPED | OUTPATIENT
Start: 2018-01-05 | End: 2018-07-05

## 2018-01-05 RX ORDER — AMLODIPINE BESYLATE 5 MG/1
5 TABLET ORAL DAILY
Qty: 90 TABLET | Refills: 1 | Status: SHIPPED | OUTPATIENT
Start: 2018-01-05 | End: 2018-07-17

## 2018-01-05 NOTE — NURSING NOTE
Keyonna is here for a bp check    Pre-Visit Screening :  Immunizations : up to date    Colonoscopy : is up to date  Mammogram : is up to date  Asthma Action Test/Plan : na  PHQ9/GAD7 :  Na    Pulse - regular  My Chart - accepts    CLASSIFICATION OF OVERWEIGHT AND OBESITY BY BMI                         Obesity Class           BMI(kg/m2)  Underweight                                    < 18.5  Normal                                         18.5-24.9  Overweight                                     25.0-29.9  OBESITY                     I                  30.0-34.9                              II                 35.0-39.9  EXTREME OBESITY             III                >40                             Patient's  BMI Body mass index is 22.15 kg/(m^2).  http://hin.nhlbi.nih.gov/menuplanner/menu.cgi  Questioned patient about current smoking habits.  Pt. has never smoked.  The patient has verbalized that it is ok to leave a detailed voice message on the patient's cell phone with results/recommendations from this visit.       Verified [phone] phone number:

## 2018-01-05 NOTE — PROGRESS NOTES
SUBJECTIVE:                                                    Keyonna Garcia is a 67 year old female who presents to clinic today for the following health issues:    Hypertension Follow-up - losartan inc. To 100 mg      Outpatient blood pressures are not being checked.    Low Salt Diet: no added salt      Specifically denies chest pain, palpitations, dyspnea, or peripheral edema.             BP Readings from Last 3 Encounters:   01/05/18 138/84   11/30/17 138/76   09/14/17 140/80    Wt Readings from Last 3 Encounters:   01/05/18 82.1 kg (181 lb)   11/30/17 81.6 kg (179 lb 12.8 oz)   09/14/17 82.6 kg (182 lb 3.2 oz)                  Patient Active Problem List   Diagnosis     Essential hypertension, benign     Mixed hyperlipidemia     Adjustment disorder with mixed anxiety and depressed mood     Health Care Home     Gastroesophageal reflux disease without esophagitis     ACP (advance care planning)     Seborrheic dermatitis     S/P total hip arthroplasty     Aftercare following right hip joint replacement surgery     Presence of right hip implant     History of basal cell carcinoma     Osteopenia, repeat DEXA 2022     Obesity (BMI 30-39.9)     Past Surgical History:   Procedure Laterality Date     ARTHROPLASTY HIP Right 12/27/2016    Procedure: ARTHROPLASTY HIP;  Surgeon: Rolando Baeza MD;  Location: [facility] OR     FINGER SURGERY  2008          ORTHOPEDIC SURGERY  2008    left finger surgery     SALPINGO-OOPHORECTOMY, COMBINED  1973    Right ovary removed benign cyst       Social History   Substance Use Topics     Smoking status: Former Smoker     Packs/day: 1.00     Years: 15.00     Types: Cigarettes     Quit date: 12/1/2016     Smokeless tobacco: Never Used     Alcohol use 0.0 oz/week     0 Standard drinks or equivalent per week      Comment: 1 drink monthly     Family History   Problem Relation Age of Onset     Hypertension Mother      Lipids Mother      Osteoarthritis Mother      Hypertension Father      Lipids  "Father      Osteoarthritis Father      C.A.D. No family hx of      DIABETES No family hx of      Breast Cancer No family hx of      Cancer - colorectal No family hx of            Current Outpatient Prescriptions   Medication Sig Dispense Refill     losartan (COZAAR) 100 MG tablet Take 1 tablet (100 mg) by mouth daily 90 tablet 1     hydrochlorothiazide (HYDRODIURIL) 25 MG tablet Take 1 tablet (25 mg) by mouth daily 90 tablet 1     amLODIPine (NORVASC) 5 MG tablet Take 1 tablet (5 mg) by mouth daily 90 tablet 1     buPROPion (WELLBUTRIN XL) 150 MG 24 hr tablet Take 1 tablet (150 mg) by mouth every morning 90 tablet 3     omeprazole (PRILOSEC) 20 MG CR capsule TAKE 1 CAPSULE BY MOUTH DAILY 90 capsule 3     aspirin 81 MG tablet Take 81 mg by mouth daily       ketoconazole (NIZORAL) 2 % cream Apply to AA BID PRN 60 g 3     cetirizine (ZYRTEC) 10 MG tablet Take 5 mg by mouth daily       pravastatin (PRAVACHOL) 20 MG tablet TAKE 1 TABELT BY MOUTH EVERY NIGHT AT BEDTIME 90 tablet 3     Allergies   Allergen Reactions     Lisinopril Cough     Simvastatin Cramps         OBJECTIVE:                                                    /84 (BP Location: Right arm, Patient Position: Chair, Cuff Size: Adult Large)  Pulse 78  Temp 98  F (36.7  C) (Oral)  Ht 1.588 m (5' 2.5\")  Wt 82.1 kg (181 lb)  LMP  (LMP Unknown)  SpO2 99%  Breastfeeding? No  BMI 32.58 kg/m2 Body mass index is 32.58 kg/(m^2).   GENERAL: healthy, alert and no distress  HEAD: Normocephalic, atraumatic  EYES: Eyes grossly normal to inspection, extraocular movements - intact  EARS: External ear and canals canals- normal; TMs- normal  NOSE: No discharge noted  MOUTH/THROAT: no ulcers, no lesions, pharynx non-erythematous, no exudates present, tonsils without hypertrophy, mucous membranes moist.   NECK: no tenderness, no adenopathy, no asymmetry, no masses, no stiffness; thyroid- normal to palpation  RESP: lungs clear to auscultation - no crackles, no " "rhonchi, no wheezes  CV: regular rates and rhythm, normal S1 S2, no S3 or S4 and no murmur, no click or rub -         ASSESSMENT/PLAN:                                                        ICD-10-CM    1. Obesity (BMI 30-39.9) E66.9    2. Essential hypertension, benign I10 losartan (COZAAR) 100 MG tablet     hydrochlorothiazide (HYDRODIURIL) 25 MG tablet     amLODIPine (NORVASC) 5 MG tablet     BASIC METABOLIC PANEL (QUEST)     VENOUS COLLECTION       Improved   FU 6 months    BMI:   Estimated body mass index is 32.58 kg/(m^2) as calculated from the following:    Height as of this encounter: 1.588 m (5' 2.5\").    Weight as of this encounter: 82.1 kg (181 lb).   Weight management plan: Discussed healthy diet and exercise guidelines and patient will follow up in 6 months in clinic to re-evaluate.          Barbra Puga PA-C  [facility]      "

## 2018-01-05 NOTE — MR AVS SNAPSHOT
"              After Visit Summary   1/5/2018    Keyonna Garcia    MRN: [id number]           Patient Information     Date Of Birth          1950        Visit Information        Provider Department      1/5/2018 11:15 AM Barbra Puga PA [facility]        Today's Diagnoses     Obesity (BMI 30-39.9)    -  1    Essential hypertension, benign           Follow-ups after your visit        Who to contact     If you have questions or need follow up information about today's clinic visit or your schedule please contact [facility] directly at [phone].  Normal or non-critical lab and imaging results will be communicated to you by AdelaVoicehart, letter or phone within 4 business days after the clinic has received the results. If you do not hear from us within 7 days, please contact the clinic through AdelaVoicehart or phone. If you have a critical or abnormal lab result, we will notify you by phone as soon as possible.  Submit refill requests through VB Rags or call your pharmacy and they will forward the refill request to us. Please allow 3 business days for your refill to be completed.          Additional Information About Your Visit        MyChart Information     VB Rags gives you secure access to your electronic health record. If you see a primary care provider, you can also send messages to your care team and make appointments. If you have questions, please call your primary care clinic.  If you do not have a primary care provider, please call [phone] and they will assist you.        Care EveryWhere ID     This is your Care EveryWhere ID. This could be used by other organizations to access your [place] medical records  [id number]        Your Vitals Were     Pulse Temperature Height Last Period Pulse Oximetry Breastfeeding?    78 98  F (36.7  C) (Oral) 1.588 m (5' 2.5\") (LMP Unknown) 99% No    BMI (Body Mass Index)                   32.58 kg/m2            " Blood Pressure from Last 3 Encounters:   01/05/18 138/84   11/30/17 138/76   09/14/17 140/80    Weight from Last 3 Encounters:   01/05/18 82.1 kg (181 lb)   11/30/17 81.6 kg (179 lb 12.8 oz)   09/14/17 82.6 kg (182 lb 3.2 oz)              We Performed the Following     BASIC METABOLIC PANEL (QUEST)     VENOUS COLLECTION          Where to get your medicines      These medications were sent to [id number] - [place], [place] - [address]  [address] , [place] [address]     Phone:  [phone]     amLODIPine 5 MG tablet    hydrochlorothiazide 25 MG tablet    losartan 100 MG tablet          Primary Care Provider Office Phone # Fax #    TEENA Gastelum [phone] [address]  [facility] [address]        Equal Access to Services     [facility]: Hadii aad ku hadasho Soomaali, waaxda luqadaha, qaybta kaalmada adeegyada, waxay idiin hayaan chi sorensen . So [facility] [phone].    ATENCIÓN: Si habla español, tiene a green disposición servicios gratuitos de asistencia lingüística. Que al [phone].    We comply with applicable federal civil rights laws and [place] laws. We do not discriminate on the basis of race, color, national origin, age, disability, sex, sexual orientation, or gender identity.            Thank you!     Thank you for choosing [facility]  for your care. Our goal is always to provide you with excellent care. Hearing back from our patients is one way we can continue to improve our services. Please take a few minutes to complete the written survey that you may receive in the mail after your visit with us. Thank you!             Your Updated Medication List - Protect others around you: Learn how to safely use, store and throw away your medicines at www.disposemymeds.org.          This list is accurate as of: 1/5/18 11:59 PM.  Always use your most recent med list.                   Brand Name Dispense  Instructions for use Diagnosis    amLODIPine 5 MG tablet    NORVASC    90 tablet    Take 1 tablet (5 mg) by mouth daily    Essential hypertension, benign       aspirin 81 MG tablet      Take 81 mg by mouth daily        buPROPion 150 MG 24 hr tablet    WELLBUTRIN XL    90 tablet    Take 1 tablet (150 mg) by mouth every morning    Adjustment disorder with mixed anxiety and depressed mood       cetirizine 10 MG tablet    zyrTEC     Take 5 mg by mouth daily        hydrochlorothiazide 25 MG tablet    HYDRODIURIL    90 tablet    Take 1 tablet (25 mg) by mouth daily    Essential hypertension, benign       ketoconazole 2 % cream    NIZORAL    60 g    Apply to AA BID PRN    Dermatitis, seborrheic       losartan 100 MG tablet    COZAAR    90 tablet    Take 1 tablet (100 mg) by mouth daily    Essential hypertension, benign       omeprazole 20 MG CR capsule    priLOSEC    90 capsule    TAKE 1 CAPSULE BY MOUTH DAILY    Gastroesophageal reflux disease without esophagitis       pravastatin 20 MG tablet    PRAVACHOL    90 tablet    TAKE 1 TABELT BY MOUTH EVERY NIGHT AT BEDTIME    Mixed hyperlipidemia

## 2018-01-06 LAB
BUN SERPL-MCNC: 18 MG/DL (ref 7–25)
BUN/CREATININE RATIO: 18 (CALC) (ref 6–22)
CALCIUM SERPL-MCNC: 9.4 MG/DL (ref 8.6–10.4)
CHLORIDE SERPLBLD-SCNC: 101 MMOL/L (ref 98–110)
CO2 SERPL-SCNC: 27 MMOL/L (ref 20–31)
CREAT SERPL-MCNC: 1 MG/DL (ref 0.5–0.99)
EGFR AFRICAN AMERICAN - QUEST: 68 ML/MIN/1.73M2
GFR SERPL CREATININE-BSD FRML MDRD: 59 ML/MIN/1.73M2
GLUCOSE - QUEST: 105 MG/DL (ref 65–99)
POTASSIUM SERPL-SCNC: 4.2 MMOL/L (ref 3.5–5.3)
SODIUM SERPL-SCNC: 138 MMOL/L (ref 135–146)

## 2018-01-09 PROBLEM — E66.9 OBESITY (BMI 30-39.9): Status: ACTIVE | Noted: 2018-01-09

## 2018-01-15 ENCOUNTER — TRANSFERRED RECORDS (OUTPATIENT)
Dept: FAMILY MEDICINE | Facility: CLINIC | Age: 68
End: 2018-01-15

## 2018-03-03 ENCOUNTER — HEALTH MAINTENANCE LETTER (OUTPATIENT)
Age: 68
End: 2018-03-03

## 2018-06-17 NOTE — NURSING NOTE
Keyonna is here for a medication recheck     Pre-Visit Screening :  Immunizations : up to date    Colonoscopy : Declined  Mammogram : is up to date  Asthma Action Test/Plan : NA  PHQ9/GAD7 :  utd    Pulse - irregular  My Chart - accepts    CLASSIFICATION OF OVERWEIGHT AND OBESITY BY BMI                         Obesity Class           BMI(kg/m2)  Underweight                                    < 18.5  Normal                                         18.5-24.9  Overweight                                     25.0-29.9  OBESITY                     I                  30.0-34.9                              II                 35.0-39.9  EXTREME OBESITY             III                >40                             Patient's  BMI Body mass index is 32.62 kg/(m^2).  http://hin.nhlbi.nih.gov/menuplanner/menu.cgi  Questioned patient about current smoking habits.  Pt. has never smoked.  The patient has verbalized that it is ok to leave a detailed voice message on the patient's cell phone with results/recommendations from this visit.     Verified Cell phone number.       MONIQUE Todd ([facility])    
today

## 2018-07-05 DIAGNOSIS — I10 ESSENTIAL HYPERTENSION, BENIGN: ICD-10-CM

## 2018-07-05 RX ORDER — LOSARTAN POTASSIUM 100 MG/1
TABLET ORAL
Qty: 30 TABLET | Refills: 0 | Status: SHIPPED | OUTPATIENT
Start: 2018-07-05 | End: 2018-07-17

## 2018-07-05 NOTE — TELEPHONE ENCOUNTER
Patient is requesting a refill of   Pending Prescriptions:                       Disp   Refills    losartan (COZAAR) 100 MG tablet [Pharmacy*30 tab*0            Sig: Take 1 tablet by mouth daily    She was last seen in clinic on 1/5/2018 and was told to return in 6 months. Routing to Opal due to Barbra being out of office for approval or denial of refill. No appointment is set up yet, would you like us to call to come in for appointment?

## 2018-07-05 NOTE — TELEPHONE ENCOUNTER
Due for 6 month f/u OV as recommended. Fasting is possible. Sent 30 day refill. Please contact pt to inform and schedule.

## 2018-07-16 NOTE — PROGRESS NOTES
SUBJECTIVE:   Keyonna Garcia is a 68 year old female who presents to clinic today for the following health issues:      Hyperlipidemia Follow-Up      Rate your low fat/cholesterol diet?: good    Taking statin?  Yes, no muscle aches from statin    Other lipid medications/supplements?:  none    Hypertension Follow-up      Outpatient blood pressures are being checked at work.  Results are 130s.    Low Salt Diet: not monitoring salt    Anxiety Follow-Up    Status since last visit: No change    Other associated symptoms:None    Complicating factors:   Significant life event: No  - stress with employee at work  Current substance abuse: None  Depression symptoms: No      HOWARD-7 SCORE 8/1/2017   Total Score 7       HOWARD-7        Quit smoking 2 years ago  Smoked for 15 years 1 ppd.      Pt has had a cough for the last 2.5 weeks. Slight SOB, no fevers.    Allergies: cat - just put cat down.  Mold and dust.  OTC: Zyrtec    GERD: - Zantac not cutting it  Omeprazole - restarted 3 weeks ago and sx resolved        Problem list and histories reviewed & adjusted, as indicated.  Additional history: as documented    Patient Active Problem List   Diagnosis     Essential hypertension, benign     Mixed hyperlipidemia     Adjustment disorder with mixed anxiety and depressed mood     Health Care Home     Gastroesophageal reflux disease without esophagitis     ACP (advance care planning)     Seborrheic dermatitis     S/P total hip arthroplasty     Aftercare following right hip joint replacement surgery     Presence of right hip implant     History of basal cell carcinoma     Osteopenia, repeat DEXA 2022     Obesity (BMI 30-39.9)     Past Surgical History:   Procedure Laterality Date     ARTHROPLASTY HIP Right 12/27/2016    Procedure: ARTHROPLASTY HIP;  Surgeon: Rolando Baeza MD;  Location: [facility] OR     FINGER SURGERY  2008          ORTHOPEDIC SURGERY  2008    left finger surgery     SALPINGO-OOPHORECTOMY, COMBINED  1973    Right ovary  removed benign cyst       Social History   Substance Use Topics     Smoking status: Former Smoker     Packs/day: 1.00     Years: 15.00     Types: Cigarettes     Quit date: 12/1/2016     Smokeless tobacco: Never Used     Alcohol use 0.0 oz/week     0 Standard drinks or equivalent per week      Comment: 1 drink monthly     Family History   Problem Relation Age of Onset     Hypertension Mother      Lipids Mother      Osteoarthritis Mother      Hypertension Father      Lipids Father      Osteoarthritis Father      C.A.D. No family hx of      Diabetes No family hx of      Breast Cancer No family hx of      Cancer - colorectal No family hx of          Current Outpatient Prescriptions   Medication Sig Dispense Refill     amLODIPine (NORVASC) 5 MG tablet Take 1 tablet (5 mg) by mouth daily 90 tablet 3     aspirin 81 MG tablet Take 81 mg by mouth daily       buPROPion (WELLBUTRIN XL) 150 MG 24 hr tablet Take 1 tablet (150 mg) by mouth every morning 90 tablet 3     cetirizine (ZYRTEC) 10 MG tablet Take 5 mg by mouth daily       hydrochlorothiazide (HYDRODIURIL) 25 MG tablet Take 1 tablet (25 mg) by mouth daily 90 tablet 3     ketoconazole (NIZORAL) 2 % cream Apply to AA BID PRN 60 g 3     losartan (COZAAR) 100 MG tablet Take 1 tablet (100 mg) by mouth daily 90 tablet 3     montelukast (SINGULAIR) 10 MG tablet Take 1 tablet (10 mg) by mouth At Bedtime 30 tablet 3     omeprazole (PRILOSEC) 20 MG CR capsule TAKE 1 CAPSULE BY MOUTH DAILY 90 capsule 3     pravastatin (PRAVACHOL) 20 MG tablet TAKE 1 TABELT BY MOUTH EVERY NIGHT AT BEDTIME 90 tablet 3     [DISCONTINUED] amLODIPine (NORVASC) 5 MG tablet Take 1 tablet (5 mg) by mouth daily 90 tablet 1     [DISCONTINUED] buPROPion (WELLBUTRIN XL) 150 MG 24 hr tablet Take 1 tablet (150 mg) by mouth every morning 90 tablet 3     [DISCONTINUED] hydrochlorothiazide (HYDRODIURIL) 25 MG tablet Take 1 tablet (25 mg) by mouth daily 90 tablet 1     [DISCONTINUED] losartan (COZAAR) 100 MG tablet  Take 1 tablet by mouth daily 30 tablet 0     [DISCONTINUED] pravastatin (PRAVACHOL) 20 MG tablet TAKE 1 TABELT BY MOUTH EVERY NIGHT AT BEDTIME 90 tablet 3     Allergies   Allergen Reactions     Lisinopril Cough     Simvastatin Cramps     Recent Labs   Lab Test  01/05/18   1151  11/30/17   1727   08/01/17   1124  12/29/16   0603   12/01/16   1430  06/28/16   0815  01/23/16   0927   12/05/14   0936   05/14/12   1910   LDL   --    --    --   106   --    --    --   118  103   < >  105   < >  118   HDL   --    --    --   70   --    --    --   72  75   < >  77   < >  99   TRIG   --    --    --   160*   --    --    --   122  89   < >  163*   < >  79   ALT   --    --    --   20   --    --    --    --   18   --   19   < >  16   CR  1.00*  1.02*   < >  0.79  0.78   < >  0.78  0.91  0.95   < >  0.97   < >  0.87   GFRESTIMATED  59*  58*   < >  79  74   < >  74  67  64   < >  63   < >  73   GFRESTBLACK   --    --    --    --   89   --   89   --    --    --    --    --    --    POTASSIUM  4.2  3.9   < >  3.9   --    < >   --   4.2  4.4   < >  4.1   < >  3.8   TSH   --    --    --    --    --    --    --    --    --    --    --    --   4.07    < > = values in this interval not displayed.          BP Readings from Last 3 Encounters:   07/17/18 128/76   01/05/18 138/84   11/30/17 138/76    Wt Readings from Last 3 Encounters:   07/17/18 80.7 kg (178 lb)   01/05/18 82.1 kg (181 lb)   11/30/17 81.6 kg (179 lb 12.8 oz)                  Labs reviewed in EPIC    Reviewed and updated as needed this visit by clinical staff  Tobacco  Allergies  Problems       Reviewed and updated as needed this visit by Provider         ROS:  EYES: NEGATIVE for vision changes or irritation  ENT/MOUTH: NEGATIVE for ear, mouth and throat problems  CV: NEGATIVE for chest pain, palpitations or peripheral edema  GI: NEGATIVE for nausea, abdominal pain, heartburn, or change in bowel habits  : NEGATIVE for frequency, dysuria, or hematuria    OBJECTIVE:     BP  128/76 (BP Location: Left arm, Patient Position: Sitting, Cuff Size: Adult Large)  Pulse 77  Temp 97.9  F (36.6  C) (Oral)  Wt 80.7 kg (178 lb)  LMP  (LMP Unknown)  SpO2 96%  BMI 32.04 kg/m2  Body mass index is 32.04 kg/(m^2).  GENERAL: healthy, alert and no distress  EYES: Eyes grossly normal to inspection,  conjunctivae and sclerae normal  EARS: ear canals and TM's normal  NOSE: no discharge noted  MOUTH: mouth without ulcers or lesions, mucous membranes moist  NECK: no adenopathy, no asymmetry, masses, or scars and thyroid normal to palpation  CV: regular rate and rhythm, normal S1 S2, no S3 or S4, no murmur, click or rub  RESP: lungs clear to auscultation bilaterally - no rales, rhonchi or wheezes  ABDOMEN: soft, nontender, no hepatosplenomegaly, no masses and bowel sounds normal     Mental Status Exam:   Appearance: calm  Grooming: adequately groomed  Demeanor: engaged, cooperative  Affect: normal  Speech: Normal.  Gait:Normal.  Movements: Normal  Form of thought: Logical, Linear and Goal directed  Thought content:  Normal  Insight:Good   Judgment: Good   Cognition: Good           ASSESSMENT/PLAN:     1. Essential hypertension, benign  controlled  - amLODIPine (NORVASC) 5 MG tablet; Take 1 tablet (5 mg) by mouth daily  Dispense: 90 tablet; Refill: 3  - hydrochlorothiazide (HYDRODIURIL) 25 MG tablet; Take 1 tablet (25 mg) by mouth daily  Dispense: 90 tablet; Refill: 3  - losartan (COZAAR) 100 MG tablet; Take 1 tablet (100 mg) by mouth daily  Dispense: 90 tablet; Refill: 3  - Lipid Profile  - COMPREHENSIVE METABOLIC PANEL (QUEST) XCMP  - VENOUS COLLECTION    2. Adjustment disorder with mixed anxiety and depressed mood  controlled  - buPROPion (WELLBUTRIN XL) 150 MG 24 hr tablet; Take 1 tablet (150 mg) by mouth every morning  Dispense: 90 tablet; Refill: 3    3. Gastroesophageal reflux disease without esophagitis  Continue PPI - AE reviewed  - omeprazole (PRILOSEC) 20 MG CR capsule; TAKE 1 CAPSULE BY MOUTH  "DAILY  Dispense: 90 capsule; Refill: 3    4. Mixed hyperlipidemia    - pravastatin (PRAVACHOL) 20 MG tablet; TAKE 1 TABELT BY MOUTH EVERY NIGHT AT BEDTIME  Dispense: 90 tablet; Refill: 3  - Lipid Profile  - COMPREHENSIVE METABOLIC PANEL (QUEST) XCMP  - VENOUS COLLECTION    5. Chronic nonseasonal allergic rhinitis due to other allergen  Added Singulair  I reviewed the patient information handout from Up To Date on this medication including side effects with the patient.    - montelukast (SINGULAIR) 10 MG tablet; Take 1 tablet (10 mg) by mouth At Bedtime  Dispense: 30 tablet; Refill: 3    6. Need for vaccination  Pt advised to get Shingrix  Due for PPSV 23 after 8/1    7. Cough  RTC for Chest Xray in 2 weeks if cough not resolved    8. Obesity      BMI:   Estimated body mass index is 32.04 kg/(m^2) as calculated from the following:    Height as of 1/5/18: 1.588 m (5' 2.5\").    Weight as of this encounter: 80.7 kg (178 lb).   Weight management plan: Discussed healthy diet and exercise guidelines and patient will follow up in 12 months in clinic to re-evaluate.      FUTURE APPOINTMENTS:       - Follow-up visit in 1 year fasting CPE    Barbra Puga PA-C  7/17/2018          "

## 2018-07-17 ENCOUNTER — OFFICE VISIT (OUTPATIENT)
Dept: FAMILY MEDICINE | Facility: CLINIC | Age: 68
End: 2018-07-17

## 2018-07-17 VITALS
OXYGEN SATURATION: 96 % | SYSTOLIC BLOOD PRESSURE: 128 MMHG | TEMPERATURE: 97.9 F | HEART RATE: 77 BPM | DIASTOLIC BLOOD PRESSURE: 76 MMHG | WEIGHT: 178 LBS | BODY MASS INDEX: 32.04 KG/M2

## 2018-07-17 DIAGNOSIS — R05.9 COUGH: ICD-10-CM

## 2018-07-17 DIAGNOSIS — I10 ESSENTIAL HYPERTENSION, BENIGN: Primary | ICD-10-CM

## 2018-07-17 DIAGNOSIS — K21.9 GASTROESOPHAGEAL REFLUX DISEASE WITHOUT ESOPHAGITIS: ICD-10-CM

## 2018-07-17 DIAGNOSIS — J30.89 CHRONIC NONSEASONAL ALLERGIC RHINITIS DUE TO OTHER ALLERGEN: ICD-10-CM

## 2018-07-17 DIAGNOSIS — E78.2 MIXED HYPERLIPIDEMIA: ICD-10-CM

## 2018-07-17 DIAGNOSIS — E66.9 OBESITY (BMI 30-39.9): ICD-10-CM

## 2018-07-17 DIAGNOSIS — F43.23 ADJUSTMENT DISORDER WITH MIXED ANXIETY AND DEPRESSED MOOD: ICD-10-CM

## 2018-07-17 DIAGNOSIS — Z23 NEED FOR VACCINATION: ICD-10-CM

## 2018-07-17 PROCEDURE — 80053 COMPREHEN METABOLIC PANEL: CPT | Mod: 90 | Performed by: PHYSICIAN ASSISTANT

## 2018-07-17 PROCEDURE — 80061 LIPID PANEL: CPT | Mod: 90 | Performed by: PHYSICIAN ASSISTANT

## 2018-07-17 PROCEDURE — 36415 COLL VENOUS BLD VENIPUNCTURE: CPT | Performed by: PHYSICIAN ASSISTANT

## 2018-07-17 PROCEDURE — 99214 OFFICE O/P EST MOD 30 MIN: CPT | Performed by: PHYSICIAN ASSISTANT

## 2018-07-17 RX ORDER — PRAVASTATIN SODIUM 20 MG
TABLET ORAL
Qty: 90 TABLET | Refills: 3 | Status: SHIPPED | OUTPATIENT
Start: 2018-07-17 | End: 2019-07-17

## 2018-07-17 RX ORDER — LOSARTAN POTASSIUM 100 MG/1
100 TABLET ORAL DAILY
Qty: 90 TABLET | Refills: 3 | Status: SHIPPED | OUTPATIENT
Start: 2018-07-17 | End: 2019-07-17

## 2018-07-17 RX ORDER — HYDROCHLOROTHIAZIDE 25 MG/1
25 TABLET ORAL DAILY
Qty: 90 TABLET | Refills: 3 | Status: SHIPPED | OUTPATIENT
Start: 2018-07-17 | End: 2019-07-17

## 2018-07-17 RX ORDER — MONTELUKAST SODIUM 10 MG/1
10 TABLET ORAL AT BEDTIME
Qty: 30 TABLET | Refills: 3 | Status: SHIPPED | OUTPATIENT
Start: 2018-07-17 | End: 2019-08-03

## 2018-07-17 RX ORDER — AMLODIPINE BESYLATE 5 MG/1
5 TABLET ORAL DAILY
Qty: 90 TABLET | Refills: 3 | Status: SHIPPED | OUTPATIENT
Start: 2018-07-17 | End: 2019-07-17

## 2018-07-17 RX ORDER — BUPROPION HYDROCHLORIDE 150 MG/1
150 TABLET ORAL EVERY MORNING
Qty: 90 TABLET | Refills: 3 | Status: SHIPPED | OUTPATIENT
Start: 2018-07-17 | End: 2019-07-17

## 2018-07-17 ASSESSMENT — ANXIETY QUESTIONNAIRES
2. NOT BEING ABLE TO STOP OR CONTROL WORRYING: SEVERAL DAYS
1. FEELING NERVOUS, ANXIOUS, OR ON EDGE: SEVERAL DAYS
7. FEELING AFRAID AS IF SOMETHING AWFUL MIGHT HAPPEN: SEVERAL DAYS
GAD7 TOTAL SCORE: 6
6. BECOMING EASILY ANNOYED OR IRRITABLE: SEVERAL DAYS
5. BEING SO RESTLESS THAT IT IS HARD TO SIT STILL: NOT AT ALL
3. WORRYING TOO MUCH ABOUT DIFFERENT THINGS: SEVERAL DAYS
IF YOU CHECKED OFF ANY PROBLEMS ON THIS QUESTIONNAIRE, HOW DIFFICULT HAVE THESE PROBLEMS MADE IT FOR YOU TO DO YOUR WORK, TAKE CARE OF THINGS AT HOME, OR GET ALONG WITH OTHER PEOPLE: NOT DIFFICULT AT ALL

## 2018-07-17 ASSESSMENT — PATIENT HEALTH QUESTIONNAIRE - PHQ9: 5. POOR APPETITE OR OVEREATING: SEVERAL DAYS

## 2018-07-17 NOTE — MR AVS SNAPSHOT
After Visit Summary   7/17/2018    Keyonna Garcia    MRN: [id number]           Patient Information     Date Of Birth          1950        Visit Information        Provider Department      7/17/2018 7:30 AM Barbra Puga PA [facility]        Today's Diagnoses     Essential hypertension, benign    -  1    Adjustment disorder with mixed anxiety and depressed mood        Gastroesophageal reflux disease without esophagitis        Mixed hyperlipidemia        Chronic nonseasonal allergic rhinitis due to other allergen        Need for vaccination        Cough        Obesity (BMI 30-39.9)           Follow-ups after your visit        Who to contact     If you have questions or need follow up information about today's clinic visit or your schedule please contact [facility] directly at [phone].  Normal or non-critical lab and imaging results will be communicated to you by InteliCloudhart, letter or phone within 4 business days after the clinic has received the results. If you do not hear from us within 7 days, please contact the clinic through InteliCloudhart or phone. If you have a critical or abnormal lab result, we will notify you by phone as soon as possible.  Submit refill requests through Qlusters or call your pharmacy and they will forward the refill request to us. Please allow 3 business days for your refill to be completed.          Additional Information About Your Visit        MyChart Information     Qlusters gives you secure access to your electronic health record. If you see a primary care provider, you can also send messages to your care team and make appointments. If you have questions, please call your primary care clinic.  If you do not have a primary care provider, please call [phone] and they will assist you.        Care EveryWhere ID     This is your Care EveryWhere ID. This could be used by other organizations to access your [place]  medical records  [id number]        Your Vitals Were     Pulse Temperature Last Period Pulse Oximetry BMI (Body Mass Index)       77 97.9  F (36.6  C) (Oral) (LMP Unknown) 96% 32.04 kg/m2        Blood Pressure from Last 3 Encounters:   07/17/18 128/76   01/05/18 138/84   11/30/17 138/76    Weight from Last 3 Encounters:   07/17/18 80.7 kg (178 lb)   01/05/18 82.1 kg (181 lb)   11/30/17 81.6 kg (179 lb 12.8 oz)              We Performed the Following     COMPREHENSIVE METABOLIC PANEL (QUEST) XCMP     Lipid Profile     VENOUS COLLECTION          Today's Medication Changes          These changes are accurate as of 7/17/18  8:21 AM.  If you have any questions, ask your nurse or doctor.               Start taking these medicines.        Dose/Directions    montelukast 10 MG tablet   Commonly known as:  SINGULAIR   Used for:  Chronic nonseasonal allergic rhinitis due to other allergen   Started by:  Barbra Puga PA        Dose:  10 mg   Take 1 tablet (10 mg) by mouth At Bedtime   Quantity:  30 tablet   Refills:  3         These medicines have changed or have updated prescriptions.        Dose/Directions    losartan 100 MG tablet   Commonly known as:  COZAAR   This may have changed:  See the new instructions.   Used for:  Essential hypertension, benign   Changed by:  Barbra Puga PA        Dose:  100 mg   Take 1 tablet (100 mg) by mouth daily   Quantity:  90 tablet   Refills:  3            Where to get your medicines      These medications were sent to [id number] - [place], [place] - [address]   [address], [place] [address]     Phone:  [phone]     amLODIPine 5 MG tablet    buPROPion 150 MG 24 hr tablet    hydrochlorothiazide 25 MG tablet    losartan 100 MG tablet    montelukast 10 MG tablet    omeprazole 20 MG CR capsule    pravastatin 20 MG tablet                Primary Care Provider Office Phone # Fax #    TEENA Gastelum [phone]        [address] [facility] [address]        Equal Access to Services     TAMMYMAURISIO BELLA : Hadii aad ku hadnatachao Sonikoleali, waaxda luqadaha, qaybta kaalmada chileylaantoinette, abril valle heribertodave mgearnestskip laHaaan . So [facility] [phone].    ATENCIÓN: Si habla español, tiene a green disposición servicios gratuitos de asistencia lingüística. Llame al [phone].    We comply with applicable federal civil rights laws and [place] laws. We do not discriminate on the basis of race, color, national origin, age, disability, sex, sexual orientation, or gender identity.            Thank you!     Thank you for choosing [facility]  for your care. Our goal is always to provide you with excellent care. Hearing back from our patients is one way we can continue to improve our services. Please take a few minutes to complete the written survey that you may receive in the mail after your visit with us. Thank you!             Your Updated Medication List - Protect others around you: Learn how to safely use, store and throw away your medicines at www.disposemymeds.org.          This list is accurate as of 7/17/18  8:21 AM.  Always use your most recent med list.                   Brand Name Dispense Instructions for use Diagnosis    amLODIPine 5 MG tablet    NORVASC    90 tablet    Take 1 tablet (5 mg) by mouth daily    Essential hypertension, benign       aspirin 81 MG tablet      Take 81 mg by mouth daily        buPROPion 150 MG 24 hr tablet    WELLBUTRIN XL    90 tablet    Take 1 tablet (150 mg) by mouth every morning    Adjustment disorder with mixed anxiety and depressed mood       cetirizine 10 MG tablet    zyrTEC     Take 5 mg by mouth daily        hydrochlorothiazide 25 MG tablet    HYDRODIURIL    90 tablet    Take 1 tablet (25 mg) by mouth daily    Essential hypertension, benign       ketoconazole 2 % cream    NIZORAL    60 g    Apply to AA BID PRN    Dermatitis, seborrheic       losartan 100 MG tablet     COZAAR    90 tablet    Take 1 tablet (100 mg) by mouth daily    Essential hypertension, benign       montelukast 10 MG tablet    SINGULAIR    30 tablet    Take 1 tablet (10 mg) by mouth At Bedtime    Chronic nonseasonal allergic rhinitis due to other allergen       omeprazole 20 MG CR capsule    priLOSEC    90 capsule    TAKE 1 CAPSULE BY MOUTH DAILY    Gastroesophageal reflux disease without esophagitis       pravastatin 20 MG tablet    PRAVACHOL    90 tablet    TAKE 1 TABELT BY MOUTH EVERY NIGHT AT BEDTIME    Mixed hyperlipidemia

## 2018-07-17 NOTE — NURSING NOTE
Keyonna is here for med check and cough/stephanie x 2 weeks    Pre-visit Screening:  Immunizations:  up to date  Colonoscopy:  is up to date  Mammogram: is up to date  Asthma Action Test/Plan:  NA  PHQ9:  Done today  GAD7:  Done today  Questioned patient about current smoking habits Pt. quit smoking some time ago.  Ok to leave detailed message on voice mail for today's visit only Yes, phone # [phone]

## 2018-07-18 LAB
ALBUMIN SERPL-MCNC: 4.1 G/DL (ref 3.6–5.1)
ALBUMIN/GLOB SERPL: 1.4 (CALC) (ref 1–2.5)
ALP SERPL-CCNC: 67 U/L (ref 33–130)
ALT SERPL-CCNC: 16 U/L (ref 6–29)
AST SERPL-CCNC: 18 U/L (ref 10–35)
BILIRUB SERPL-MCNC: 0.5 MG/DL (ref 0.2–1.2)
BUN SERPL-MCNC: 17 MG/DL (ref 7–25)
BUN/CREATININE RATIO: 15 (CALC) (ref 6–22)
CALCIUM SERPL-MCNC: 9.2 MG/DL (ref 8.6–10.4)
CHLORIDE SERPLBLD-SCNC: 101 MMOL/L (ref 98–110)
CHOLEST SERPL-MCNC: 212 MG/DL
CHOLEST/HDLC SERPL: 3.4 (CALC)
CO2 SERPL-SCNC: 26 MMOL/L (ref 20–31)
CREAT SERPL-MCNC: 1.17 MG/DL (ref 0.5–0.99)
EGFR AFRICAN AMERICAN - QUEST: 56 ML/MIN/1.73M2
GFR SERPL CREATININE-BSD FRML MDRD: 49 ML/MIN/1.73M2
GLOBULIN, CALCULATED - QUEST: 2.9 G/DL (CALC) (ref 1.9–3.7)
GLUCOSE - QUEST: 111 MG/DL (ref 65–99)
HDLC SERPL-MCNC: 62 MG/DL
LDLC SERPL CALC-MCNC: 120 MG/DL (CALC)
NONHDLC SERPL-MCNC: 150 MG/DL (CALC)
POTASSIUM SERPL-SCNC: 4 MMOL/L (ref 3.5–5.3)
PROT SERPL-MCNC: 7 G/DL (ref 6.1–8.1)
SODIUM SERPL-SCNC: 139 MMOL/L (ref 135–146)
TRIGL SERPL-MCNC: 181 MG/DL

## 2018-07-18 ASSESSMENT — PATIENT HEALTH QUESTIONNAIRE - PHQ9: SUM OF ALL RESPONSES TO PHQ QUESTIONS 1-9: 4

## 2018-07-18 ASSESSMENT — ANXIETY QUESTIONNAIRES: GAD7 TOTAL SCORE: 6

## 2019-01-11 ENCOUNTER — TELEPHONE (OUTPATIENT)
Dept: FAMILY MEDICINE | Facility: CLINIC | Age: 69
End: 2019-01-11

## 2019-01-11 DIAGNOSIS — I10 BENIGN ESSENTIAL HYPERTENSION: Primary | ICD-10-CM

## 2019-01-14 ENCOUNTER — HOSPITAL ENCOUNTER (OUTPATIENT)
Dept: MAMMOGRAPHY | Facility: CLINIC | Age: 69
Discharge: HOME | End: 2019-01-14
Attending: PHYSICIAN ASSISTANT | Admitting: PHYSICIAN ASSISTANT
Payer: COMMERCIAL

## 2019-01-14 DIAGNOSIS — I10 BENIGN ESSENTIAL HYPERTENSION: ICD-10-CM

## 2019-01-14 DIAGNOSIS — Z12.39 BREAST CANCER SCREENING: ICD-10-CM

## 2019-01-14 PROCEDURE — 80048 BASIC METABOLIC PNL TOTAL CA: CPT | Mod: 90 | Performed by: PHYSICIAN ASSISTANT

## 2019-01-14 PROCEDURE — 77067 SCR MAMMO BI INCL CAD: CPT

## 2019-01-14 PROCEDURE — 36415 COLL VENOUS BLD VENIPUNCTURE: CPT | Performed by: PHYSICIAN ASSISTANT

## 2019-01-15 LAB
BUN SERPL-MCNC: 19 MG/DL (ref 7–25)
BUN/CREATININE RATIO: 18 (CALC) (ref 6–22)
CALCIUM SERPL-MCNC: 9.6 MG/DL (ref 8.6–10.4)
CHLORIDE SERPLBLD-SCNC: 101 MMOL/L (ref 98–110)
CO2 SERPL-SCNC: 28 MMOL/L (ref 20–32)
CREAT SERPL-MCNC: 1.03 MG/DL (ref 0.5–0.99)
EGFR AFRICAN AMERICAN - QUEST: 66 ML/MIN/1.73M2
GFR SERPL CREATININE-BSD FRML MDRD: 57 ML/MIN/1.73M2
GLUCOSE - QUEST: 120 MG/DL (ref 65–99)
POTASSIUM SERPL-SCNC: 4 MMOL/L (ref 3.5–5.3)
SODIUM SERPL-SCNC: 140 MMOL/L (ref 135–146)

## 2019-07-17 DIAGNOSIS — K21.9 GASTROESOPHAGEAL REFLUX DISEASE WITHOUT ESOPHAGITIS: ICD-10-CM

## 2019-07-17 DIAGNOSIS — I10 ESSENTIAL HYPERTENSION, BENIGN: ICD-10-CM

## 2019-07-17 DIAGNOSIS — E78.2 MIXED HYPERLIPIDEMIA: ICD-10-CM

## 2019-07-17 DIAGNOSIS — F43.23 ADJUSTMENT DISORDER WITH MIXED ANXIETY AND DEPRESSED MOOD: ICD-10-CM

## 2019-07-17 RX ORDER — AMLODIPINE BESYLATE 5 MG/1
TABLET ORAL
Qty: 30 TABLET | Refills: 0 | COMMUNITY
Start: 2019-07-17 | End: 2019-08-03

## 2019-07-17 RX ORDER — PRAVASTATIN SODIUM 20 MG
TABLET ORAL
Qty: 30 TABLET | Refills: 0 | COMMUNITY
Start: 2019-07-17 | End: 2019-08-03

## 2019-07-17 RX ORDER — BUPROPION HYDROCHLORIDE 150 MG/1
TABLET ORAL
Qty: 30 TABLET | Refills: 0 | COMMUNITY
Start: 2019-07-17 | End: 2019-08-03

## 2019-07-17 RX ORDER — LOSARTAN POTASSIUM 100 MG/1
TABLET ORAL
Qty: 30 TABLET | Refills: 0 | COMMUNITY
Start: 2019-07-17 | End: 2019-08-03

## 2019-07-17 RX ORDER — HYDROCHLOROTHIAZIDE 25 MG/1
TABLET ORAL
Qty: 30 TABLET | Refills: 0 | COMMUNITY
Start: 2019-07-17 | End: 2019-08-03

## 2019-07-17 NOTE — TELEPHONE ENCOUNTER
Called 30 day of all of pt's medication to [facility] [place]. Pt due for fasting CPX. Please call to schedule.    Thanks, Rosa

## 2019-08-02 NOTE — PROGRESS NOTES
Subjective     Keyonna Garcia is a 69 year old female who presents to clinic today for the following health issues:    HPI     Hyperlipidemia Follow-Up      Are you having any of the following symptoms? (Select all that apply)  No complaints of shortness of breath, chest pain or pressure.  No increased sweating or nausea with activity.  No left-sided neck or arm pain.  No complaints of pain in calves when walking 1-2 blocks.    Are you regularly taking any medication or supplement to lower your cholesterol?   No    Are you having muscle aches or other side effects that you think could be caused by your cholesterol lowering medication?  No      Hypertension Follow-up      Do you check your blood pressure regularly outside of the clinic? No     Are you following a low salt diet? Yes      Depression and Anxiety Follow-Up    How are you doing with your depression since your last visit? No change    How are you doing with your anxiety since your last visit?  No change    Are you having other symptoms that might be associated with depression or anxiety? No    Have you had a significant life event? Job Concerns     Do you have any concerns with your use of alcohol or other drugs? No    Social History     Tobacco Use     Smoking status: Former Smoker     Packs/day: 1.00     Years: 15.00     Pack years: 15.00     Types: Cigarettes     Last attempt to quit: 2016     Years since quittin.6     Smokeless tobacco: Never Used   Substance Use Topics     Alcohol use: Yes     Alcohol/week: 0.0 oz     Comment: 1 drink monthly     Drug use: No     PHQ 2017 2018 8/3/2019   PHQ-9 Total Score 3 4 3   Q9: Thoughts of better off dead/self-harm past 2 weeks Not at all Not at all Not at all     HOWARD-7 SCORE 2017 2018 8/3/2019   Total Score 7 6 4     No flowsheet data found.  No flowsheet data found.  In the past two weeks have you had thoughts of suicide or self-harm?  No.    Do you have concerns about your personal  safety or the safety of others?   No    Suicide Assessment Five-step Evaluation and Treatment (SAFE-T)    Amount of exercise or physical activity: none    Problems taking medications regularly: No    Medication side effects: none    Diet: regular (no restrictions)    Wt Readings from Last 5 Encounters:   08/03/19 80.6 kg (177 lb 9.6 oz)   07/17/18 80.7 kg (178 lb)   01/05/18 82.1 kg (181 lb)   11/30/17 81.6 kg (179 lb 12.8 oz)   09/14/17 82.6 kg (182 lb 3.2 oz)     Body mass index is 31.97 kg/m .        GERD Follow up:    Current medication(s): omeprazole    EGD history: never  Are symptoms controlled on current therapy?      The patient has been counseled about risks of long term PPI use including risks of C. Diff, decreased bone density and memory loss.     If patient is currently on long term she has tried to wean off PPI without success.             Singulair  - not using  Daily congestion year round.         Patient Active Problem List   Diagnosis     Essential hypertension, benign     Mixed hyperlipidemia     Adjustment disorder with mixed anxiety and depressed mood     Health Care Home     Gastroesophageal reflux disease without esophagitis     ACP (advance care planning)     Seborrheic dermatitis     S/P total hip arthroplasty     Aftercare following right hip joint replacement surgery     Presence of right hip implant     History of basal cell carcinoma     Osteopenia, repeat DEXA 2022     Obesity (BMI 30-39.9)     Past Surgical History:   Procedure Laterality Date     ARTHROPLASTY HIP Right 12/27/2016    Procedure: ARTHROPLASTY HIP;  Surgeon: Rolando Baeza MD;  Location: [facility] OR     FINGER SURGERY  2008          ORTHOPEDIC SURGERY  2008    left finger surgery     SALPINGO-OOPHORECTOMY, COMBINED  1973    Right ovary removed benign cyst       Social History     Tobacco Use     Smoking status: Former Smoker     Packs/day: 1.00     Years: 15.00     Pack years: 15.00     Types: Cigarettes     Last attempt to  quit: 2016     Years since quittin.6     Smokeless tobacco: Never Used   Substance Use Topics     Alcohol use: Yes     Alcohol/week: 0.0 oz     Comment: 1 drink monthly     Family History   Problem Relation Age of Onset     Hypertension Mother      Lipids Mother      Osteoarthritis Mother      Hypertension Father      Lipids Father      Osteoarthritis Father      C.A.D. No family hx of      Diabetes No family hx of      Breast Cancer No family hx of      Cancer - colorectal No family hx of            Current Outpatient Medications   Medication Sig Dispense Refill     amLODIPine (NORVASC) 5 MG tablet Take 1 tablet (5 mg) by mouth daily 90 tablet 3     buPROPion (WELLBUTRIN XL) 150 MG 24 hr tablet Take 1 tablet (150 mg) by mouth every morning 90 tablet 3     cetirizine (ZYRTEC) 10 MG tablet Take 5 mg by mouth daily       hydrochlorothiazide (HYDRODIURIL) 25 MG tablet Take 1 tablet (25 mg) by mouth daily 90 tablet 3     ketoconazole (NIZORAL) 2 % cream Apply to AA BID PRN 60 g 3     losartan (COZAAR) 100 MG tablet Take 1 tablet (100 mg) by mouth daily 90 tablet 3     montelukast (SINGULAIR) 10 MG tablet Take 1 tablet (10 mg) by mouth At Bedtime 90 tablet 3     omeprazole (PRILOSEC) 20 MG DR capsule TAKE ONE CAPSULE BY MOUTH ONE TIME DAILY 90 capsule 3     pravastatin (PRAVACHOL) 20 MG tablet TAKE ONE TABLET BY MOUTH EVERY NIGHT AT BEDTIME 90 tablet 3     Allergies   Allergen Reactions     Lisinopril Cough     Simvastatin Cramps     Recent Labs   Lab Test 19  1147 18  0836  17  1124 16  0603  16  1430 16  0815 16  0927  12  1910   LDL  --  120*  --  106  --   --   --  118 103   < > 118   HDL  --  62  --  70  --   --   --  72 75   < > 99   TRIG  --  181*  --  160*  --   --   --  122 89   < > 79   ALT  --  16  --  20  --   --   --   --  18   < > 16   CR 1.03* 1.17*   < > 0.79 0.78   < > 0.78 0.91 0.95   < > 0.87   GFRESTIMATED 57* 49*   < > 79 74   < > 74 67 64   <  > 73   GFRESTBLACK  --   --   --   --  89  --  89  --   --   --   --    POTASSIUM 4.0 4.0   < > 3.9  --    < >  --  4.2 4.4   < > 3.8   TSH  --   --   --   --   --   --   --   --   --   --  4.07    < > = values in this interval not displayed.        BP Readings from Last 3 Encounters:   08/03/19 132/80   07/17/18 128/76   01/05/18 138/84    Wt Readings from Last 3 Encounters:   08/03/19 80.6 kg (177 lb 9.6 oz)   07/17/18 80.7 kg (178 lb)   01/05/18 82.1 kg (181 lb)                        Review of Systems       ROS COMP: Constitutional, HEENT, cardiovascular, pulmonary, gi and gu systems are negative, except as otherwise noted.          Objective    /80 (BP Location: Left arm, Patient Position: Sitting, Cuff Size: Adult Large)   Pulse 74   Temp 97.9  F (36.6  C) (Oral)   Wt 80.6 kg (177 lb 9.6 oz)   LMP  (LMP Unknown)   SpO2 98%   BMI 31.97 kg/m    Body mass index is 31.97 kg/m .  Physical Exam   GENERAL: healthy, alert and no distress  EYES: Eyes grossly normal to inspection, PERRL and conjunctivae and sclerae normal  HENT: ear canals and TM's normal, nose and mouth without ulcers or lesions  NECK: no adenopathy, no asymmetry, masses, or scars and thyroid normal to palpation  RESP: lungs clear to auscultation - no rales, rhonchi or wheezes  CV: regular rate and rhythm, normal S1 S2, no S3 or S4, no murmur, click or rub, no peripheral edema and peripheral pulses strong  ABDOMEN: soft, nontender, no hepatosplenomegaly, no masses and bowel sounds normal  MS: no gross musculoskeletal defects noted, no edema  SKIN: no suspicious lesions or rashes  NEURO: Normal strength and tone, mentation intact and speech normal    Rectal: no masses, TABITHA Normal    Mental Status Exam:   Appearance: calm  Grooming: adequately groomed  Demeanor: engaged, cooperative  Affect: normal  Speech: Normal.  Gait:Normal.  Movements: Normal  Form of thought: Logical, Linear and Goal directed  Thought content:  Normal  Insight:Good  "  Judgment: Good   Cognition: Good       Diagnostic Test Results:  Labs reviewed in Epic  No results found for this or any previous visit (from the past 24 hour(s)).        Assessment & Plan     1. Adjustment disorder with mixed anxiety and depressed mood  stable  - buPROPion (WELLBUTRIN XL) 150 MG 24 hr tablet; Take 1 tablet (150 mg) by mouth every morning  Dispense: 90 tablet; Refill: 3    2. Mixed hyperlipidemia  stable  - VENOUS COLLECTION  - Lipid Panel (BFP)  - Comprehensive Metobolic Panel (BFP)  - pravastatin (PRAVACHOL) 20 MG tablet; TAKE ONE TABLET BY MOUTH EVERY NIGHT AT BEDTIME  Dispense: 90 tablet; Refill: 3    3. Essential hypertension, benign  stable  - Lipid Panel (BFP)  - Comprehensive Metobolic Panel (BFP)  - amLODIPine (NORVASC) 5 MG tablet; Take 1 tablet (5 mg) by mouth daily  Dispense: 90 tablet; Refill: 3  - hydrochlorothiazide (HYDRODIURIL) 25 MG tablet; Take 1 tablet (25 mg) by mouth daily  Dispense: 90 tablet; Refill: 3  - losartan (COZAAR) 100 MG tablet; Take 1 tablet (100 mg) by mouth daily  Dispense: 90 tablet; Refill: 3    4. Obesity (BMI 30-39.9)      5. Gastroesophageal reflux disease without esophagitis  stable  - omeprazole (PRILOSEC) 20 MG DR capsule; TAKE ONE CAPSULE BY MOUTH ONE TIME DAILY  Dispense: 90 capsule; Refill: 3    6. Chronic allergic conjunctivitis  stable  - montelukast (SINGULAIR) 10 MG tablet; Take 1 tablet (10 mg) by mouth At Bedtime  Dispense: 90 tablet; Refill: 3    7. Elevated fasting glucose  stable  - VENOUS COLLECTION  - Hemoglobin A1c    8. Special screening for malignant neoplasms, colon  stable  - FOB (Fecal Occult Blood) (BFP)     Follow-up 1 year fasting    BMI:   Estimated body mass index is 31.97 kg/m  as calculated from the following:    Height as of 1/5/18: 1.588 m (5' 2.5\").    Weight as of this encounter: 80.6 kg (177 lb 9.6 oz).   Weight management plan: Discussed healthy diet and exercise guidelines            Barbra Puga, " PA  [facility]

## 2019-08-03 ENCOUNTER — OFFICE VISIT (OUTPATIENT)
Dept: FAMILY MEDICINE | Facility: CLINIC | Age: 69
End: 2019-08-03

## 2019-08-03 VITALS
OXYGEN SATURATION: 98 % | BODY MASS INDEX: 31.97 KG/M2 | HEART RATE: 74 BPM | SYSTOLIC BLOOD PRESSURE: 132 MMHG | TEMPERATURE: 97.9 F | WEIGHT: 177.6 LBS | DIASTOLIC BLOOD PRESSURE: 80 MMHG

## 2019-08-03 DIAGNOSIS — H10.45 CHRONIC ALLERGIC CONJUNCTIVITIS: ICD-10-CM

## 2019-08-03 DIAGNOSIS — Z12.11 SPECIAL SCREENING FOR MALIGNANT NEOPLASMS, COLON: ICD-10-CM

## 2019-08-03 DIAGNOSIS — F43.23 ADJUSTMENT DISORDER WITH MIXED ANXIETY AND DEPRESSED MOOD: Primary | ICD-10-CM

## 2019-08-03 DIAGNOSIS — R73.01 ELEVATED FASTING GLUCOSE: ICD-10-CM

## 2019-08-03 DIAGNOSIS — E66.9 OBESITY (BMI 30-39.9): ICD-10-CM

## 2019-08-03 DIAGNOSIS — K21.9 GASTROESOPHAGEAL REFLUX DISEASE WITHOUT ESOPHAGITIS: ICD-10-CM

## 2019-08-03 DIAGNOSIS — E78.2 MIXED HYPERLIPIDEMIA: ICD-10-CM

## 2019-08-03 DIAGNOSIS — I10 ESSENTIAL HYPERTENSION, BENIGN: ICD-10-CM

## 2019-08-03 LAB
HBA1C MFR BLD: 6.1 % (ref 4–7)
HEMOCCULT STL QL: NORMAL

## 2019-08-03 PROCEDURE — 83036 HEMOGLOBIN GLYCOSYLATED A1C: CPT | Performed by: PHYSICIAN ASSISTANT

## 2019-08-03 PROCEDURE — 80061 LIPID PANEL: CPT | Performed by: PHYSICIAN ASSISTANT

## 2019-08-03 PROCEDURE — 80053 COMPREHEN METABOLIC PANEL: CPT | Performed by: PHYSICIAN ASSISTANT

## 2019-08-03 PROCEDURE — 82274 ASSAY TEST FOR BLOOD FECAL: CPT | Performed by: PHYSICIAN ASSISTANT

## 2019-08-03 PROCEDURE — 36415 COLL VENOUS BLD VENIPUNCTURE: CPT | Performed by: PHYSICIAN ASSISTANT

## 2019-08-03 PROCEDURE — 99214 OFFICE O/P EST MOD 30 MIN: CPT | Performed by: PHYSICIAN ASSISTANT

## 2019-08-03 RX ORDER — MONTELUKAST SODIUM 10 MG/1
10 TABLET ORAL AT BEDTIME
Qty: 90 TABLET | Refills: 3 | Status: SHIPPED | OUTPATIENT
Start: 2019-08-03 | End: 2020-08-20

## 2019-08-03 RX ORDER — AMLODIPINE BESYLATE 5 MG/1
5 TABLET ORAL DAILY
Qty: 90 TABLET | Refills: 3 | Status: SHIPPED | OUTPATIENT
Start: 2019-08-03 | End: 2020-08-20

## 2019-08-03 RX ORDER — LOSARTAN POTASSIUM 100 MG/1
100 TABLET ORAL DAILY
Qty: 90 TABLET | Refills: 3 | Status: SHIPPED | OUTPATIENT
Start: 2019-08-03 | End: 2020-08-20

## 2019-08-03 RX ORDER — PRAVASTATIN SODIUM 20 MG
TABLET ORAL
Qty: 90 TABLET | Refills: 3 | Status: SHIPPED | OUTPATIENT
Start: 2019-08-03 | End: 2020-08-20

## 2019-08-03 RX ORDER — HYDROCHLOROTHIAZIDE 25 MG/1
25 TABLET ORAL DAILY
Qty: 90 TABLET | Refills: 3 | Status: SHIPPED | OUTPATIENT
Start: 2019-08-03 | End: 2020-08-20

## 2019-08-03 RX ORDER — BUPROPION HYDROCHLORIDE 150 MG/1
150 TABLET ORAL EVERY MORNING
Qty: 90 TABLET | Refills: 3 | Status: SHIPPED | OUTPATIENT
Start: 2019-08-03 | End: 2020-08-20

## 2019-08-03 ASSESSMENT — ANXIETY QUESTIONNAIRES
1. FEELING NERVOUS, ANXIOUS, OR ON EDGE: SEVERAL DAYS
6. BECOMING EASILY ANNOYED OR IRRITABLE: NOT AT ALL
GAD7 TOTAL SCORE: 4
5. BEING SO RESTLESS THAT IT IS HARD TO SIT STILL: NOT AT ALL
2. NOT BEING ABLE TO STOP OR CONTROL WORRYING: SEVERAL DAYS
7. FEELING AFRAID AS IF SOMETHING AWFUL MIGHT HAPPEN: NOT AT ALL
3. WORRYING TOO MUCH ABOUT DIFFERENT THINGS: SEVERAL DAYS
IF YOU CHECKED OFF ANY PROBLEMS ON THIS QUESTIONNAIRE, HOW DIFFICULT HAVE THESE PROBLEMS MADE IT FOR YOU TO DO YOUR WORK, TAKE CARE OF THINGS AT HOME, OR GET ALONG WITH OTHER PEOPLE: NOT DIFFICULT AT ALL

## 2019-08-03 ASSESSMENT — PATIENT HEALTH QUESTIONNAIRE - PHQ9
SUM OF ALL RESPONSES TO PHQ QUESTIONS 1-9: 3
5. POOR APPETITE OR OVEREATING: SEVERAL DAYS

## 2019-08-03 NOTE — NURSING NOTE
Keyonna is here today for a fasting med recheck.    Pre-visit Screening:  Immunizations:  up to date  Colonoscopy:  is up to date  Mammogram: is up to date  Asthma Action Test/Plan:  NA  PHQ9:  Done today  GAD7:  Done today  Questioned patient about current smoking habits Pt. has never smoked.  Ok to leave detailed message on voice mail for today's visit only Yes, phone # [phone]

## 2019-08-04 ASSESSMENT — ANXIETY QUESTIONNAIRES: GAD7 TOTAL SCORE: 4

## 2019-08-05 ENCOUNTER — TELEPHONE (OUTPATIENT)
Dept: FAMILY MEDICINE | Facility: CLINIC | Age: 69
End: 2019-08-05

## 2019-08-05 ENCOUNTER — MYC MEDICAL ADVICE (OUTPATIENT)
Dept: FAMILY MEDICINE | Facility: CLINIC | Age: 69
End: 2019-08-05

## 2019-08-05 DIAGNOSIS — R73.01 ELEVATED FASTING GLUCOSE: ICD-10-CM

## 2019-08-05 DIAGNOSIS — E78.2 MIXED HYPERLIPIDEMIA: Primary | ICD-10-CM

## 2019-08-05 LAB
ALBUMIN SERPL-MCNC: 4.5 G/DL (ref 3.6–5.1)
ALBUMIN/GLOB SERPL: 1.6 {RATIO} (ref 1–2.5)
ALP SERPL-CCNC: 67 U/L (ref 33–130)
ALT 1742-6: 12 U/L (ref 5–30)
AST 1920-8: 15 U/L (ref 7–31)
BILIRUB SERPL-MCNC: 0.6 MG/DL (ref 0.2–1.2)
BUN SERPL-MCNC: 17 MG/DL (ref 7–25)
BUN/CREATININE RATIO: 16.2 (ref 6–22)
CALCIUM SERPL-MCNC: 9.3 MG/DL (ref 8.6–10.3)
CHLORIDE SERPLBLD-SCNC: 101.2 MMOL/L (ref 98–110)
CHOLEST SERPL-MCNC: 236 MG/DL (ref 0–199)
CHOLEST/HDLC SERPL: 3 {RATIO} (ref 0–5)
CO2 SERPL-SCNC: 28.3 MMOL/L (ref 20–32)
CREAT SERPL-MCNC: 1.05 MG/DL (ref 0.7–1.18)
GLOBULIN, CALCULATED - QUEST: 2.8 (ref 1.9–3.7)
GLUCOSE SERPL-MCNC: 107 MG/DL (ref 60–99)
HDLC SERPL-MCNC: 77 MG/DL (ref 40–150)
LDLC SERPL CALC-MCNC: 128 MG/DL (ref 0–130)
POTASSIUM SERPL-SCNC: 4.31 MMOL/L (ref 3.5–5.3)
PROT SERPL-MCNC: 7.3 G/DL (ref 6.1–8.1)
SODIUM SERPL-SCNC: 140.6 MMOL/L (ref 135–146)
TRIGL SERPL-MCNC: 157 MG/DL (ref 0–149)

## 2019-08-05 NOTE — TELEPHONE ENCOUNTER
Chol elevated  Options: inc to 40 mg Pravastatin   Lose weight and reduce chol in diet.         Advised lifestyle mod and lab only in 3 months    Barbra Puga PA-C  8/5/2019      LM on voicemail

## 2019-09-13 ENCOUNTER — TELEPHONE (OUTPATIENT)
Dept: FAMILY MEDICINE | Facility: CLINIC | Age: 69
End: 2019-09-13

## 2019-09-13 DIAGNOSIS — Z96.641 STATUS POST RIGHT HIP REPLACEMENT: Primary | ICD-10-CM

## 2019-09-13 NOTE — TELEPHONE ENCOUNTER
Patient called in and left a message asking for a prescription of amoxicillin be sent in for her since she has an upcoming dental procedure within the next week and had a hip replacement within the last 2 years. Routing to [facility] for review, are you able to send this in for the patient?

## 2019-09-14 RX ORDER — AMOXICILLIN 500 MG/1
CAPSULE ORAL
Qty: 4 CAPSULE | Refills: 0 | Status: SHIPPED | OUTPATIENT
Start: 2019-09-14 | End: 2020-08-28

## 2019-09-29 ENCOUNTER — HEALTH MAINTENANCE LETTER (OUTPATIENT)
Age: 69
End: 2019-09-29

## 2019-11-05 ENCOUNTER — OFFICE VISIT (OUTPATIENT)
Dept: DERMATOLOGY | Facility: CLINIC | Age: 69
End: 2019-11-05
Payer: COMMERCIAL

## 2019-11-05 VITALS — OXYGEN SATURATION: 98 % | SYSTOLIC BLOOD PRESSURE: 144 MMHG | HEART RATE: 74 BPM | DIASTOLIC BLOOD PRESSURE: 81 MMHG

## 2019-11-05 DIAGNOSIS — L82.1 SEBORRHEIC KERATOSIS: ICD-10-CM

## 2019-11-05 DIAGNOSIS — D22.9 NEVUS: ICD-10-CM

## 2019-11-05 DIAGNOSIS — L81.4 LENTIGO: ICD-10-CM

## 2019-11-05 DIAGNOSIS — L21.9 DERMATITIS, SEBORRHEIC: ICD-10-CM

## 2019-11-05 DIAGNOSIS — D18.01 ANGIOMA OF SKIN: ICD-10-CM

## 2019-11-05 DIAGNOSIS — B00.9 HSV INFECTION: Primary | ICD-10-CM

## 2019-11-05 PROCEDURE — 99214 OFFICE O/P EST MOD 30 MIN: CPT | Performed by: PHYSICIAN ASSISTANT

## 2019-11-05 NOTE — LETTER
11/5/2019         RE: Keyonna Garcia  [address]  [place] [address]        Dear Colleague,    Thank you for referring your patient, Keyonna Garcia, to the [facility]. Please see a copy of my visit note below.    HPI:   Chief complaints: Keyonna Garcia is a 69 year old female who presents for Full skin cancer screening to rule out skin cancer   Last Skin Exam: 5-10 years ago      1st Baseline: no  Personal HX of Skin Cancer: yes, BCC    Personal HX of Malignant Melanoma: none   Family HX of Skin Cancer / Malignant Melanoma: Father's twin brother with NMSC  Personal HX of Atypical Moles: none  Risk factors: sun exposure, frequent sun exposure in her youth  New / Changing lesions: yes, spot on back  Social History:   On review of systems, there are no further skin complaints, patient is feeling otherwise well.  See patient intake sheet.  ROS of the following were done and are negative: Constitutional, Eyes, Ears, Nose,   Mouth, Throat, Cardiovascular, Respiratory, GI, Genitourinary, Musculoskeletal,   Psychiatric, Endocrine, Allergic/Immunologic.    This document serves as a record of the services and decisions personally performed and made by Cheyenne Wall, MS, PA-C. It was created on her behalf by Linda Abreu, a trained medical scribe. The creation of this document is based on the provider's statements to the medical scribe.  Linda Abreu 11:25 AM November 5, 2019    PHYSICAL EXAM:   BP (!) 144/81   Pulse 74   LMP  (LMP Unknown)   SpO2 98%   Breastfeeding? No   Skin exam performed as follows: Type 2 skin. Mood appropriate  Alert and Oriented X 3. Well developed, well nourished in no distress.  General appearance: Normal  Head including face: Normal  Eyes: conjunctiva and lids: Normal  Mouth: Lips, teeth, gums: Normal  Neck: Normal  Chest-breast/axillae: Normal  Back: Normal  Spleen and liver: Normal  Cardiovascular: Exam of peripheral  vascular system by observation for swelling, varicosities, edema: Normal  Genitalia: groin, buttocks: Normal  Extremities: digits/nails (clubbing): Normal  Eccrine and Apocrine glands: Normal  Right upper extremity: Normal  Left upper extremity: Normal  Right lower extremity: Normal  Left lower extremity: Normal  Skin: Scalp and body hair: See below    Pt deferred exam of breasts, groin, buttocks: No    Other physical findings:  1. Multiple pigmented macules on extremities and trunk  2. Multiple pigmented macules on face, trunk and extremities  3. Multiple vascular papules on trunk, arms and legs  4. Multiple scattered keratotic plaques       Except as noted above, no other signs of skin cancer or melanoma.     ASSESSMENT/PLAN:   Benign Full skin cancer screening today.     Patient with history of BCC  Advised on monthly self exams and 1 year  Patient Education: Appropriate brochures given.    1. Multiple benign appearing nevi on arms, legs and trunk. Discussed ABCDEs of melanoma and sunscreen.   2. Multiple lentigos on arms, legs and trunk. Advised benign, no treatment needed.  3. Multiple scattered angiomas. Advised benign, no treatment needed.   4. Seborrheic keratosis on arms, legs and trunk. Advised benign, no treatment needed.  5. Seborrheic dermatitis - advised on chronic, recurrent condition. Discussed that it is a reaction to the normal yeast on the skin.   --Continue ketoconazole shampoo daily as needed; may call for refill.  6. HSV infection- advised on diagnosis and treatment options. Using creams currently and is helpful, she will continue with this. May call for valtrex if needed.  7. Keyonna to follow up with Primary Care provider regarding elevated blood pressure.        Follow-up: yearly FSE/PRN sooner    1.) Patient was asked about new and changing moles. YES  2.) Patient received a complete physical skin examination: YES  3.) Patient was counseled to perform a monthly self skin examination:  YES  Scribed By: Linda Abreu, Medical Scribe    The information in this document, created by the medical scribe for me, accurately reflects the services I personally performed and the decisions made by me. I have reviewed and approved this document for accuracy prior to leaving the patient care area.  November 5, 2019 11:32 AM     Cheyenne Wall MS, PABLO        Again, thank you for allowing me to participate in the care of your patient.        Sincerely,        Cheyenne Wall PA-C

## 2019-11-05 NOTE — PROGRESS NOTES
HPI:   Chief complaints: Keyonna Garcia is a 69 year old female who presents for Full skin cancer screening to rule out skin cancer   Last Skin Exam: 5-10 years ago      1st Baseline: no  Personal HX of Skin Cancer: yes, BCC    Personal HX of Malignant Melanoma: none   Family HX of Skin Cancer / Malignant Melanoma: Father's twin brother with NMSC  Personal HX of Atypical Moles: none  Risk factors: sun exposure, frequent sun exposure in her youth  New / Changing lesions: yes, spot on back  Social History:   On review of systems, there are no further skin complaints, patient is feeling otherwise well.  See patient intake sheet.  ROS of the following were done and are negative: Constitutional, Eyes, Ears, Nose,   Mouth, Throat, Cardiovascular, Respiratory, GI, Genitourinary, Musculoskeletal,   Psychiatric, Endocrine, Allergic/Immunologic.    This document serves as a record of the services and decisions personally performed and made by Cheyenne Wall, MS, PA-C. It was created on her behalf by Linda Abreu, a trained medical scribe. The creation of this document is based on the provider's statements to the medical scribe.  Linda Abreu 11:25 AM November 5, 2019    PHYSICAL EXAM:   BP (!) 144/81   Pulse 74   LMP  (LMP Unknown)   SpO2 98%   Breastfeeding? No   Skin exam performed as follows: Type 2 skin. Mood appropriate  Alert and Oriented X 3. Well developed, well nourished in no distress.  General appearance: Normal  Head including face: Normal  Eyes: conjunctiva and lids: Normal  Mouth: Lips, teeth, gums: Normal  Neck: Normal  Chest-breast/axillae: Normal  Back: Normal  Spleen and liver: Normal  Cardiovascular: Exam of peripheral vascular system by observation for swelling, varicosities, edema: Normal  Genitalia: groin, buttocks: Normal  Extremities: digits/nails (clubbing): Normal  Eccrine and Apocrine glands: Normal  Right upper extremity: Normal  Left upper extremity: Normal  Right  lower extremity: Normal  Left lower extremity: Normal  Skin: Scalp and body hair: See below    Pt deferred exam of breasts, groin, buttocks: No    Other physical findings:  1. Multiple pigmented macules on extremities and trunk  2. Multiple pigmented macules on face, trunk and extremities  3. Multiple vascular papules on trunk, arms and legs  4. Multiple scattered keratotic plaques       Except as noted above, no other signs of skin cancer or melanoma.     ASSESSMENT/PLAN:   Benign Full skin cancer screening today.     Patient with history of BCC  Advised on monthly self exams and 1 year  Patient Education: Appropriate brochures given.    1. Multiple benign appearing nevi on arms, legs and trunk. Discussed ABCDEs of melanoma and sunscreen.   2. Multiple lentigos on arms, legs and trunk. Advised benign, no treatment needed.  3. Multiple scattered angiomas. Advised benign, no treatment needed.   4. Seborrheic keratosis on arms, legs and trunk. Advised benign, no treatment needed.  5. Seborrheic dermatitis - advised on chronic, recurrent condition. Discussed that it is a reaction to the normal yeast on the skin.   --Continue ketoconazole shampoo daily as needed; may call for refill.  6. HSV infection- advised on diagnosis and treatment options. Using creams currently and is helpful, she will continue with this. May call for valtrex if needed.  7. Keyonna to follow up with Primary Care provider regarding elevated blood pressure.        Follow-up: yearly FSE/PRN sooner    1.) Patient was asked about new and changing moles. YES  2.) Patient received a complete physical skin examination: YES  3.) Patient was counseled to perform a monthly self skin examination: YES  Scribed By: Linda Abreu, Medical Scribe    The information in this document, created by the medical scribe for me, accurately reflects the services I personally performed and the decisions made by me. I have reviewed and approved this document for  accuracy prior to leaving the patient care area.  November 5, 2019 11:32 AM     Cheyenne Wall MS, PA-C

## 2019-11-19 DIAGNOSIS — Z96.641 STATUS POST RIGHT HIP REPLACEMENT: ICD-10-CM

## 2019-11-19 RX ORDER — AMOXICILLIN 500 MG/1
CAPSULE ORAL
Qty: 4 CAPSULE | Refills: 0 | Status: CANCELLED | OUTPATIENT
Start: 2019-11-19

## 2019-11-19 NOTE — TELEPHONE ENCOUNTER
Please call pt - due for lab only fasting recheck  Doesn't need to see me    LM - when was joint - if > 2 years not recommended abx unless joint infection in the past    Barbra Puga PA-C  11/19/2019

## 2019-11-20 NOTE — TELEPHONE ENCOUNTER
Pt called back and understood now that she does not need an abx because it has been over 2 years since joint replacement.        She scheduled a fasting lab visit for this Saturday. Barbra-- could you place stand orders?? Thanks.

## 2019-11-20 NOTE — TELEPHONE ENCOUNTER
LM on VM for Keyonna letting her know she needs to schedule a fasting chol check and asked her to call back to let us know if her joint replacement has been greater then 2 years, it is not recommended to be on an abx per [facility].  Awaiting return call.    Patient's phone #[phone]

## 2019-11-23 DIAGNOSIS — R73.01 ELEVATED FASTING GLUCOSE: ICD-10-CM

## 2019-11-23 DIAGNOSIS — E78.2 MIXED HYPERLIPIDEMIA: ICD-10-CM

## 2019-11-23 LAB
CHOLEST SERPL-MCNC: 149 MG/DL (ref 0–199)
CHOLEST/HDLC SERPL: 4 {RATIO} (ref 0–5)
GLUCOSE SERPL-MCNC: 98 MG/DL (ref 60–99)
HDLC SERPL-MCNC: 38 MG/DL (ref 40–150)
LDLC SERPL CALC-MCNC: 84 MG/DL (ref 0–130)
TRIGL SERPL-MCNC: 137 MG/DL (ref 0–149)

## 2019-11-23 PROCEDURE — 36415 COLL VENOUS BLD VENIPUNCTURE: CPT | Performed by: PHYSICIAN ASSISTANT

## 2019-11-23 PROCEDURE — 80061 LIPID PANEL: CPT | Performed by: PHYSICIAN ASSISTANT

## 2019-11-23 PROCEDURE — 82947 ASSAY GLUCOSE BLOOD QUANT: CPT | Performed by: PHYSICIAN ASSISTANT

## 2020-03-15 ENCOUNTER — HEALTH MAINTENANCE LETTER (OUTPATIENT)
Age: 70
End: 2020-03-15

## 2020-08-10 DIAGNOSIS — E78.2 MIXED HYPERLIPIDEMIA: ICD-10-CM

## 2020-08-10 DIAGNOSIS — I10 ESSENTIAL HYPERTENSION, BENIGN: ICD-10-CM

## 2020-08-10 DIAGNOSIS — H10.45 CHRONIC ALLERGIC CONJUNCTIVITIS: ICD-10-CM

## 2020-08-10 DIAGNOSIS — K21.9 GASTROESOPHAGEAL REFLUX DISEASE WITHOUT ESOPHAGITIS: ICD-10-CM

## 2020-08-10 DIAGNOSIS — F43.23 ADJUSTMENT DISORDER WITH MIXED ANXIETY AND DEPRESSED MOOD: ICD-10-CM

## 2020-08-10 RX ORDER — AMLODIPINE BESYLATE 5 MG/1
TABLET ORAL
Qty: 90 TABLET | Refills: 0 | COMMUNITY
Start: 2020-08-10

## 2020-08-10 RX ORDER — HYDROCHLOROTHIAZIDE 25 MG/1
TABLET ORAL
Qty: 90 TABLET | Refills: 0 | COMMUNITY
Start: 2020-08-10

## 2020-08-10 RX ORDER — PRAVASTATIN SODIUM 20 MG
TABLET ORAL
Qty: 90 TABLET | Refills: 0 | COMMUNITY
Start: 2020-08-10

## 2020-08-10 RX ORDER — MONTELUKAST SODIUM 10 MG/1
TABLET ORAL
Qty: 90 TABLET | Refills: 0 | COMMUNITY
Start: 2020-08-10

## 2020-08-10 RX ORDER — BUPROPION HYDROCHLORIDE 150 MG/1
TABLET ORAL
Qty: 90 TABLET | Refills: 0 | COMMUNITY
Start: 2020-08-10

## 2020-08-10 RX ORDER — LOSARTAN POTASSIUM 100 MG/1
TABLET ORAL
Qty: 90 TABLET | Refills: 0 | COMMUNITY
Start: 2020-08-10

## 2020-08-10 NOTE — TELEPHONE ENCOUNTER
Received incoming refill requests for  Pending Prescriptions:                       Disp   Refills    omeprazole (PRILOSEC) 20 MG DR capsule [P*90 cap*0            Sig: TAKE ONE CAPSULE BY MOUTH ONE TIME DAILY    pravastatin (PRAVACHOL) 20 MG tablet [Pha*90 tab*0            Sig: TAKE ONE TABLET BY MOUTH EVERY NIGHT AT BEDTIME    losartan (COZAAR) 100 MG tablet [Pharmacy*90 tab*0            Sig: Take 1 tablet (100 mg) by mouth daily      montelukast (SINGULAIR) 10 MG tablet [Pha*90 tab*0            Sig: Take 1 tablet (10 mg) by mouth At Bedtime      buPROPion (WELLBUTRIN XL) 150 MG 24 hr ta*90 tab*0            Sig: Take 1 tablet (150 mg) by mouth every morning      amLODIPine (NORVASC) 5 MG tablet [Pharmac*90 tab*0            Sig: Take 1 tablet (5 mg) by mouth daily      hydrochlorothiazide (HYDRODIURIL) 25 MG t*90 tab*0            Sig: Take 1 tablet (25 mg) by mouth daily      Patient is due for an office visit so these are being denied as there is no current appt scheduled.

## 2020-08-27 NOTE — PROGRESS NOTES
Subjective     Keyonna Garcia is a 70 year old female who presents to clinic today for the following health issues:    HPI       Hyperlipidemia Follow-Up      Are you regularly taking any medication or supplement to lower your cholesterol?   Yes- Pravastatin 20 mg    Are you having muscle aches or other side effects that you think could be caused by your cholesterol lowering medication?  No    Hypertension Follow-up      Do you check your blood pressure regularly outside of the clinic?No    Are you following a low salt diet? Yes      Depression and Anxiety Follow-Up    How are you doing with your depression since your last visit? No change    How are you doing with your anxiety since your last visit?  No change    Are you having other symptoms that might be associated with depression or anxiety? No    Have you had a significant life event? OTHER: COVID     Do you have any concerns with your use of alcohol or other drugs? No       GERD on Prilosec for years.    Osteopenia - DEXA in 2012        Social History     Tobacco Use     Smoking status: Former Smoker     Packs/day: 1.00     Years: 15.00     Pack years: 15.00     Types: Cigarettes     Last attempt to quit: 12/1/2016     Years since quitting: 3.7     Smokeless tobacco: Never Used   Substance Use Topics     Alcohol use: Yes     Alcohol/week: 0.0 standard drinks     Comment: 1 drink monthly     Drug use: No     PHQ 7/17/2018 8/3/2019 8/28/2020   PHQ-9 Total Score 4 3 5   Q9: Thoughts of better off dead/self-harm past 2 weeks Not at all Not at all Not at all     HOWARD-7 SCORE 7/17/2018 8/3/2019 8/28/2020   Total Score 6 4 2     Last PHQ-9 8/28/2020   1.  Little interest or pleasure in doing things 0   2.  Feeling down, depressed, or hopeless 1   3.  Trouble falling or staying asleep, or sleeping too much 0   4.  Feeling tired or having little energy 1   5.  Poor appetite or overeating 1   6.  Feeling bad about yourself 1   7.  Trouble concentrating 1   8.  Moving  "slowly or restless 0   Q9: Thoughts of better off dead/self-harm past 2 weeks 0   PHQ-9 Total Score 5   Difficulty at work, home, or with people Not difficult at all     HOWARD-7  8/28/2020   1. Feeling nervous, anxious, or on edge 1   2. Not being able to stop or control worrying 0   3. Worrying too much about different things 0   4. Trouble relaxing 0   5. Being so restless that it is hard to sit still 1   6. Becoming easily annoyed or irritable 0   7. Feeling afraid, as if something awful might happen 0   HOWARD-7 Total Score 2   If you checked any problems, how difficult have they made it for you to do your work, take care of things at home, or get along with other people? Not difficult at all       Suicide Assessment Five-step Evaluation and Treatment (SAFE-T)          Feeling like using Mucinex a lot - worse since working from home.  1/2-1 Zyrtec daily.       Review of Systems   Constitutional, HEENT, cardiovascular, pulmonary, gi and gu systems are negative, except as otherwise noted.      Objective    /70   Pulse 75   Temp 98.2  F (36.8  C) (Oral)   Ht 1.581 m (5' 2.25\")   Wt 83 kg (183 lb)   LMP  (LMP Unknown)   SpO2 96%   BMI 33.20 kg/m    Body mass index is 33.2 kg/m .     Physical Exam     GENERAL: healthy, alert and no distress  EYES: Eyes grossly normal to inspection, PERRL and conjunctivae and sclerae normal  HENT: ear canals and TM's normal, nose and mouth without ulcers or lesions  NECK: no adenopathy, no asymmetry, masses, or scars and thyroid normal to palpation  RESP: lungs clear to auscultation - no rales, rhonchi or wheezes  CV: regular rate and rhythm, normal S1 S2, no S3 or S4, no murmur, click or rub, no peripheral edema and peripheral pulses strong  ABDOMEN: soft, nontender, no hepatosplenomegaly, no masses and bowel sounds normal  MS: no gross musculoskeletal defects noted, no edema  SKIN: no suspicious lesions or rashes  NEURO: Normal strength and tone, mentation intact and speech " normal  PSYCH: mentation appears normal, affect normal/bright    No results found for this or any previous visit (from the past 24 hour(s)).        Assessment & Plan     1. Screening for colon cancer  - FOB (Fecal Occult Blood) (BFP)    2. Mixed hyperlipidemia    - VENOUS COLLECTION  - Comprehensive Metobolic Panel (BFP)  - Lipid Panel (BFP)  - pravastatin (PRAVACHOL) 20 MG tablet; Take 1 tablet (20 mg) by mouth daily TAKE ONE TABLET BY MOUTH EVERY NIGHT AT BEDTIME  Dispense: 90 tablet; Refill: 3    3. Chronic allergic conjunctivitis    - montelukast (SINGULAIR) 10 MG tablet; Take 1 tablet (10 mg) by mouth At Bedtime  Dispense: 90 tablet; Refill: 3    4. Adjustment disorder with mixed anxiety and depressed mood    - buPROPion (WELLBUTRIN XL) 150 MG 24 hr tablet; Take 1 tablet (150 mg) by mouth every morning  Dispense: 90 tablet; Refill: 3    5. Gastroesophageal reflux disease without esophagitis    - omeprazole (PRILOSEC) 20 MG DR capsule; Take 1 capsule (20 mg) by mouth daily TAKE ONE CAPSULE BY MOUTH ONE TIME DAILY  Dispense: 90 capsule; Refill: 3    6. Essential hypertension, benign    - VENOUS COLLECTION  - Comprehensive Metobolic Panel (BFP)  - amLODIPine (NORVASC) 5 MG tablet; Take 1 tablet (5 mg) by mouth daily  Dispense: 90 tablet; Refill: 3  - hydrochlorothiazide (HYDRODIURIL) 25 MG tablet; Take 1 tablet (25 mg) by mouth daily  Dispense: 90 tablet; Refill: 3  - losartan (COZAAR) 100 MG tablet; Take 1 tablet (100 mg) by mouth daily  Dispense: 90 tablet; Refill: 3    7. Elevated fasting glucose    - VENOUS COLLECTION  - Comprehensive Metobolic Panel (BFP)  - Hemoglobin A1c    8. Obesity (BMI 30-39.9)    - VENOUS COLLECTION  - Comprehensive Metobolic Panel (BFP)  - Lipid Panel (BFP)  - Hemoglobin A1c    9. Osteopenia, unspecified location    - Dexa hip/pelvis/spine*  - RADIOLOGY REFERRAL         FUTURE APPOINTMENTS:       - Follow-up visit in 1 year      TEENA Gastelum  [facility]  [facility]

## 2020-08-28 ENCOUNTER — OFFICE VISIT (OUTPATIENT)
Dept: FAMILY MEDICINE | Facility: CLINIC | Age: 70
End: 2020-08-28

## 2020-08-28 VITALS
HEIGHT: 62 IN | DIASTOLIC BLOOD PRESSURE: 70 MMHG | BODY MASS INDEX: 33.68 KG/M2 | SYSTOLIC BLOOD PRESSURE: 122 MMHG | TEMPERATURE: 98.2 F | HEART RATE: 75 BPM | WEIGHT: 183 LBS | OXYGEN SATURATION: 96 %

## 2020-08-28 DIAGNOSIS — M85.80 OSTEOPENIA, UNSPECIFIED LOCATION: ICD-10-CM

## 2020-08-28 DIAGNOSIS — E78.2 MIXED HYPERLIPIDEMIA: Primary | ICD-10-CM

## 2020-08-28 DIAGNOSIS — K21.9 GASTROESOPHAGEAL REFLUX DISEASE WITHOUT ESOPHAGITIS: ICD-10-CM

## 2020-08-28 DIAGNOSIS — Z12.11 SCREENING FOR COLON CANCER: ICD-10-CM

## 2020-08-28 DIAGNOSIS — H10.45 CHRONIC ALLERGIC CONJUNCTIVITIS: ICD-10-CM

## 2020-08-28 DIAGNOSIS — R73.01 ELEVATED FASTING GLUCOSE: ICD-10-CM

## 2020-08-28 DIAGNOSIS — F43.23 ADJUSTMENT DISORDER WITH MIXED ANXIETY AND DEPRESSED MOOD: ICD-10-CM

## 2020-08-28 DIAGNOSIS — E66.9 OBESITY (BMI 30-39.9): ICD-10-CM

## 2020-08-28 DIAGNOSIS — I10 ESSENTIAL HYPERTENSION, BENIGN: ICD-10-CM

## 2020-08-28 LAB
ALBUMIN SERPL-MCNC: 4.3 G/DL (ref 3.6–5.1)
ALBUMIN/GLOB SERPL: 1.7 {RATIO} (ref 1–2.5)
ALP SERPL-CCNC: 65 U/L (ref 33–130)
ALT 1742-6: 7 U/L (ref 0–32)
AST 1920-8: 16 U/L (ref 0–35)
BILIRUB SERPL-MCNC: 0.5 MG/DL (ref 0.2–1.2)
BUN SERPL-MCNC: 16 MG/DL (ref 7–25)
BUN/CREATININE RATIO: 14.5 (ref 6–22)
CALCIUM SERPL-MCNC: 9.5 MG/DL (ref 8.6–10.3)
CHLORIDE SERPLBLD-SCNC: 103.1 MMOL/L (ref 98–110)
CHOLEST SERPL-MCNC: 216 MG/DL (ref 0–199)
CHOLEST/HDLC SERPL: 3 {RATIO} (ref 0–5)
CO2 SERPL-SCNC: 28.2 MMOL/L (ref 20–32)
CREAT SERPL-MCNC: 1.1 MG/DL (ref 0.7–1.18)
GLOBULIN, CALCULATED - QUEST: 2.6 (ref 1.9–3.7)
GLUCOSE SERPL-MCNC: 110 MG/DL (ref 60–99)
HBA1C MFR BLD: 6 % (ref 4–7)
HDLC SERPL-MCNC: 69 MG/DL (ref 40–150)
LDLC SERPL CALC-MCNC: 106 MG/DL (ref 0–130)
OCCULT BLOOD: NORMAL
POTASSIUM SERPL-SCNC: 4.32 MMOL/L (ref 3.5–5.3)
PROT SERPL-MCNC: 6.9 G/DL (ref 6.1–8.1)
SODIUM SERPL-SCNC: 140 MMOL/L (ref 135–146)
TRIGL SERPL-MCNC: 203 MG/DL (ref 0–149)

## 2020-08-28 PROCEDURE — 36415 COLL VENOUS BLD VENIPUNCTURE: CPT | Performed by: PHYSICIAN ASSISTANT

## 2020-08-28 PROCEDURE — 83036 HEMOGLOBIN GLYCOSYLATED A1C: CPT | Performed by: PHYSICIAN ASSISTANT

## 2020-08-28 PROCEDURE — 82274 ASSAY TEST FOR BLOOD FECAL: CPT | Performed by: PHYSICIAN ASSISTANT

## 2020-08-28 PROCEDURE — 80061 LIPID PANEL: CPT | Performed by: PHYSICIAN ASSISTANT

## 2020-08-28 PROCEDURE — 99214 OFFICE O/P EST MOD 30 MIN: CPT | Performed by: PHYSICIAN ASSISTANT

## 2020-08-28 PROCEDURE — 80053 COMPREHEN METABOLIC PANEL: CPT | Performed by: PHYSICIAN ASSISTANT

## 2020-08-28 RX ORDER — MONTELUKAST SODIUM 10 MG/1
10 TABLET ORAL AT BEDTIME
Qty: 90 TABLET | Refills: 3 | Status: SHIPPED | OUTPATIENT
Start: 2020-08-28 | End: 2021-09-09

## 2020-08-28 RX ORDER — HYDROCHLOROTHIAZIDE 25 MG/1
25 TABLET ORAL DAILY
Qty: 90 TABLET | Refills: 3 | Status: SHIPPED | OUTPATIENT
Start: 2020-08-28 | End: 2021-09-09

## 2020-08-28 RX ORDER — GUAIFENESIN AND DEXTROMETHORPHAN HYDROBROMIDE 600; 30 MG/1; MG/1
1 TABLET, EXTENDED RELEASE ORAL PRN
COMMUNITY
End: 2021-05-28

## 2020-08-28 RX ORDER — LOSARTAN POTASSIUM 100 MG/1
100 TABLET ORAL DAILY
Qty: 90 TABLET | Refills: 3 | Status: SHIPPED | OUTPATIENT
Start: 2020-08-28 | End: 2021-09-09

## 2020-08-28 RX ORDER — AMLODIPINE BESYLATE 5 MG/1
5 TABLET ORAL DAILY
Qty: 90 TABLET | Refills: 3 | Status: SHIPPED | OUTPATIENT
Start: 2020-08-28 | End: 2021-09-09

## 2020-08-28 RX ORDER — BUPROPION HYDROCHLORIDE 150 MG/1
150 TABLET ORAL EVERY MORNING
Qty: 90 TABLET | Refills: 3 | Status: SHIPPED | OUTPATIENT
Start: 2020-08-28 | End: 2021-09-09

## 2020-08-28 RX ORDER — PRAVASTATIN SODIUM 20 MG
20 TABLET ORAL DAILY
Qty: 90 TABLET | Refills: 3 | Status: SHIPPED | OUTPATIENT
Start: 2020-08-28 | End: 2021-09-09

## 2020-08-28 ASSESSMENT — ANXIETY QUESTIONNAIRES
GAD7 TOTAL SCORE: 2
1. FEELING NERVOUS, ANXIOUS, OR ON EDGE: SEVERAL DAYS
3. WORRYING TOO MUCH ABOUT DIFFERENT THINGS: NOT AT ALL
IF YOU CHECKED OFF ANY PROBLEMS ON THIS QUESTIONNAIRE, HOW DIFFICULT HAVE THESE PROBLEMS MADE IT FOR YOU TO DO YOUR WORK, TAKE CARE OF THINGS AT HOME, OR GET ALONG WITH OTHER PEOPLE: NOT DIFFICULT AT ALL
7. FEELING AFRAID AS IF SOMETHING AWFUL MIGHT HAPPEN: NOT AT ALL
6. BECOMING EASILY ANNOYED OR IRRITABLE: NOT AT ALL
2. NOT BEING ABLE TO STOP OR CONTROL WORRYING: NOT AT ALL
5. BEING SO RESTLESS THAT IT IS HARD TO SIT STILL: SEVERAL DAYS

## 2020-08-28 ASSESSMENT — PATIENT HEALTH QUESTIONNAIRE - PHQ9
SUM OF ALL RESPONSES TO PHQ QUESTIONS 1-9: 5
5. POOR APPETITE OR OVEREATING: NOT AT ALL

## 2020-08-28 ASSESSMENT — MIFFLIN-ST. JEOR: SCORE: 1317.3

## 2020-08-28 NOTE — NURSING NOTE
Keyonna is here for fasting/bp med check.        Pre-visit Screening:  Immunizations:  up to date  Colonoscopy:  is up to date  Mammogram: is up to date  Asthma Action Test/Plan:  NA  PHQ9:  Done today  GAD7:  Done today  Questioned patient about current smoking habits Pt. has never smoked.  Ok to leave detailed message on voice mail for today's visit only Yes, phone # [phone] (Preferred)

## 2020-08-29 ASSESSMENT — ANXIETY QUESTIONNAIRES: GAD7 TOTAL SCORE: 2

## 2020-11-24 ENCOUNTER — TRANSFERRED RECORDS (OUTPATIENT)
Dept: FAMILY MEDICINE | Facility: CLINIC | Age: 70
End: 2020-11-24

## 2021-01-14 ENCOUNTER — HEALTH MAINTENANCE LETTER (OUTPATIENT)
Age: 71
End: 2021-01-14

## 2021-03-14 ENCOUNTER — HEALTH MAINTENANCE LETTER (OUTPATIENT)
Age: 71
End: 2021-03-14

## 2021-03-14 ENCOUNTER — MYC MEDICAL ADVICE (OUTPATIENT)
Dept: FAMILY MEDICINE | Facility: CLINIC | Age: 71
End: 2021-03-14

## 2021-05-09 ENCOUNTER — HEALTH MAINTENANCE LETTER (OUTPATIENT)
Age: 71
End: 2021-05-09

## 2021-05-28 ENCOUNTER — OFFICE VISIT (OUTPATIENT)
Dept: FAMILY MEDICINE | Facility: CLINIC | Age: 71
End: 2021-05-28

## 2021-05-28 VITALS
SYSTOLIC BLOOD PRESSURE: 132 MMHG | RESPIRATION RATE: 20 BRPM | TEMPERATURE: 97.4 F | WEIGHT: 174.6 LBS | HEIGHT: 62 IN | DIASTOLIC BLOOD PRESSURE: 80 MMHG | BODY MASS INDEX: 32.13 KG/M2 | HEART RATE: 80 BPM

## 2021-05-28 DIAGNOSIS — E66.811 OBESITY (BMI 30.0-34.9): ICD-10-CM

## 2021-05-28 DIAGNOSIS — Z87.891 PERSONAL HISTORY OF TOBACCO USE, PRESENTING HAZARDS TO HEALTH: ICD-10-CM

## 2021-05-28 DIAGNOSIS — Z12.31 VISIT FOR SCREENING MAMMOGRAM: ICD-10-CM

## 2021-05-28 DIAGNOSIS — R73.01 ELEVATED FASTING GLUCOSE: Primary | ICD-10-CM

## 2021-05-28 LAB
GLUCOSE SERPL-MCNC: 96 MG/DL (ref 60–99)
HBA1C MFR BLD: 5.9 % (ref 4–7)

## 2021-05-28 PROCEDURE — 99213 OFFICE O/P EST LOW 20 MIN: CPT | Performed by: PHYSICIAN ASSISTANT

## 2021-05-28 PROCEDURE — 83036 HEMOGLOBIN GLYCOSYLATED A1C: CPT | Performed by: PHYSICIAN ASSISTANT

## 2021-05-28 PROCEDURE — 82947 ASSAY GLUCOSE BLOOD QUANT: CPT | Performed by: PHYSICIAN ASSISTANT

## 2021-05-28 PROCEDURE — 36415 COLL VENOUS BLD VENIPUNCTURE: CPT | Performed by: PHYSICIAN ASSISTANT

## 2021-05-28 ASSESSMENT — MIFFLIN-ST. JEOR: SCORE: 1279.2
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
## 2021-05-30 ENCOUNTER — RECORDS - HEALTHEAST (OUTPATIENT)
Dept: ADMINISTRATIVE | Facility: CLINIC | Age: 71
End: 2021-05-30

## 2021-07-13 ENCOUNTER — RECORDS - HEALTHEAST (OUTPATIENT)
Dept: ADMINISTRATIVE | Facility: CLINIC | Age: 71
End: 2021-07-13

## 2021-07-21 ENCOUNTER — RECORDS - HEALTHEAST (OUTPATIENT)
Dept: ADMINISTRATIVE | Facility: CLINIC | Age: 71
End: 2021-07-21

## 2021-09-09 DIAGNOSIS — I10 ESSENTIAL HYPERTENSION, BENIGN: ICD-10-CM

## 2021-09-09 DIAGNOSIS — F43.23 ADJUSTMENT DISORDER WITH MIXED ANXIETY AND DEPRESSED MOOD: ICD-10-CM

## 2021-09-09 DIAGNOSIS — H10.45 CHRONIC ALLERGIC CONJUNCTIVITIS: ICD-10-CM

## 2021-09-09 DIAGNOSIS — K21.9 GASTROESOPHAGEAL REFLUX DISEASE WITHOUT ESOPHAGITIS: ICD-10-CM

## 2021-09-09 DIAGNOSIS — E78.2 MIXED HYPERLIPIDEMIA: ICD-10-CM

## 2021-09-09 RX ORDER — PRAVASTATIN SODIUM 20 MG
TABLET ORAL
Qty: 90 TABLET | Refills: 0 | Status: SHIPPED | OUTPATIENT
Start: 2021-09-09 | End: 2021-12-20

## 2021-09-09 RX ORDER — LOSARTAN POTASSIUM 100 MG/1
100 TABLET ORAL DAILY
Qty: 90 TABLET | Refills: 0 | Status: SHIPPED | OUTPATIENT
Start: 2021-09-09 | End: 2021-12-20

## 2021-09-09 RX ORDER — MONTELUKAST SODIUM 10 MG/1
10 TABLET ORAL AT BEDTIME
Qty: 90 TABLET | Refills: 0 | Status: SHIPPED | OUTPATIENT
Start: 2021-09-09 | End: 2021-12-20

## 2021-09-09 RX ORDER — HYDROCHLOROTHIAZIDE 25 MG/1
25 TABLET ORAL DAILY
Qty: 90 TABLET | Refills: 0 | Status: SHIPPED | OUTPATIENT
Start: 2021-09-09 | End: 2021-12-20

## 2021-09-09 RX ORDER — BUPROPION HYDROCHLORIDE 150 MG/1
150 TABLET ORAL EVERY MORNING
Qty: 90 TABLET | Refills: 0 | Status: SHIPPED | OUTPATIENT
Start: 2021-09-09 | End: 2021-12-20

## 2021-09-09 RX ORDER — AMLODIPINE BESYLATE 5 MG/1
5 TABLET ORAL DAILY
Qty: 90 TABLET | Refills: 0 | Status: SHIPPED | OUTPATIENT
Start: 2021-09-09 | End: 2021-12-20

## 2021-09-09 NOTE — TELEPHONE ENCOUNTER
5/28/2021, to return in 6 months fasting for CPX.    No meds were filled at that time     Pending Prescriptions:                       Disp   Refills    amLODIPine (NORVASC) 5 MG tablet [Pharmac*90 tab*0            Sig: Take 1 tablet (5 mg) by mouth daily     pravastatin (PRAVACHOL) 20 MG tablet [Pha*90 tab*0            Sig: TAKE 1 TABLET BY MOUTH EVERY NIGHT AT BEDTIME    omeprazole (PRILOSEC) 20 MG DR capsule [P*90 cap*0            Sig: TAKE 1 CAPSULE BY MOUTH ONE TIME DAILY    montelukast (SINGULAIR) 10 MG tablet [Pha*90 tab*0            Sig: Take 1 tablet (10 mg) by mouth At Bedtime     losartan (COZAAR) 100 MG tablet [Pharmacy*90 tab*0            Sig: Take 1 tablet (100 mg) by mouth daily     hydrochlorothiazide (HYDRODIURIL) 25 MG t*90 tab*0            Sig: Take 1 tablet (25 mg) by mouth daily     buPROPion (WELLBUTRIN XL) 150 MG 24 hr ta*90 tab*0            Sig: Take 1 tablet (150 mg) by mouth every morning

## 2021-09-20 ENCOUNTER — TELEPHONE (OUTPATIENT)
Dept: FAMILY MEDICINE | Facility: CLINIC | Age: 71
End: 2021-09-20

## 2021-10-24 ENCOUNTER — HEALTH MAINTENANCE LETTER (OUTPATIENT)
Age: 71
End: 2021-10-24

## 2021-12-09 DIAGNOSIS — H10.45 CHRONIC ALLERGIC CONJUNCTIVITIS: ICD-10-CM

## 2021-12-09 DIAGNOSIS — F43.23 ADJUSTMENT DISORDER WITH MIXED ANXIETY AND DEPRESSED MOOD: ICD-10-CM

## 2021-12-09 DIAGNOSIS — I10 ESSENTIAL HYPERTENSION, BENIGN: ICD-10-CM

## 2021-12-09 DIAGNOSIS — E78.2 MIXED HYPERLIPIDEMIA: ICD-10-CM

## 2021-12-09 DIAGNOSIS — K21.9 GASTROESOPHAGEAL REFLUX DISEASE WITHOUT ESOPHAGITIS: ICD-10-CM

## 2021-12-09 RX ORDER — AMLODIPINE BESYLATE 5 MG/1
TABLET ORAL
Qty: 90 TABLET | Refills: 0 | COMMUNITY
Start: 2021-12-09

## 2021-12-09 RX ORDER — HYDROCHLOROTHIAZIDE 25 MG/1
TABLET ORAL
Qty: 90 TABLET | Refills: 0 | COMMUNITY
Start: 2021-12-09

## 2021-12-09 RX ORDER — MONTELUKAST SODIUM 10 MG/1
TABLET ORAL
Qty: 90 TABLET | Refills: 0 | COMMUNITY
Start: 2021-12-09

## 2021-12-09 RX ORDER — LOSARTAN POTASSIUM 100 MG/1
TABLET ORAL
Qty: 90 TABLET | Refills: 0 | COMMUNITY
Start: 2021-12-09

## 2021-12-09 RX ORDER — PRAVASTATIN SODIUM 20 MG
TABLET ORAL
Qty: 90 TABLET | Refills: 0 | COMMUNITY
Start: 2021-12-09

## 2021-12-09 RX ORDER — BUPROPION HYDROCHLORIDE 150 MG/1
TABLET ORAL
Qty: 90 TABLET | Refills: 0 | COMMUNITY
Start: 2021-12-09

## 2021-12-09 NOTE — TELEPHONE ENCOUNTER
Keyonna Willon is requesting a refill of:    Refused Prescriptions:                       Disp   Refills    amLODIPine (NORVASC) 5 MG tablet [Pharmacy*90 tab*0        Sig: TAKE ONE TABLET BY MOUTH ONE TIME DAILY  Refused By: NATHANAEL GIRALDO  Reason for Refusal: Patient needs appointment    pravastatin (PRAVACHOL) 20 MG tablet [Phar*90 tab*0        Sig: TAKE 1 TABLET BY MOUTH EVERY NIGHT AT BEDTIME  Refused By: NATHANAEL GIRALDO  Reason for Refusal: Patient needs appointment    omeprazole (PRILOSEC) 20 MG DR capsule [Ph*90 cap*0        Sig: TAKE ONE CAPSULE BY MOUTH ONE TIME DAILY  Refused By: NATHANAEL GIRALDO  Reason for Refusal: Patient needs appointment    montelukast (SINGULAIR) 10 MG tablet [Phar*90 tab*0        Sig: TAKE ONE TABLET BY MOUTH AT BEDTIME  Refused By: NATHANAEL GIRALDO  Reason for Refusal: Patient needs appointment    losartan (COZAAR) 100 MG tablet [Pharmacy *90 tab*0        Sig: TAKE ONE TABLET BY MOUTH ONE TIME DAILY  Refused By: NATHANAEL GIRALDO  Reason for Refusal: Patient needs appointment    hydrochlorothiazide (HYDRODIURIL) 25 MG ta*90 tab*0        Sig: TAKE ONE TABLET BY MOUTH ONE TIME DAILY  Refused By: NATHANAEL GIRALDO  Reason for Refusal: Patient needs appointment    buPROPion (WELLBUTRIN XL) 150 MG 24 hr tab*90 tab*0        Sig: TAKE ONE TABLET BY MOUTH IN THE MORNING  Refused By: NATHANAEL GIRALDO  Reason for Refusal: Patient needs appointment    Pt last seen 05/28/21 advised to return in 6 months Due for FASTING OV

## 2021-12-20 RX ORDER — MONTELUKAST SODIUM 10 MG/1
10 TABLET ORAL AT BEDTIME
Qty: 30 TABLET | Refills: 0 | Status: SHIPPED | OUTPATIENT
Start: 2021-12-20 | End: 2021-12-29

## 2021-12-20 RX ORDER — HYDROCHLOROTHIAZIDE 25 MG/1
25 TABLET ORAL DAILY
Qty: 30 TABLET | Refills: 0 | Status: SHIPPED | OUTPATIENT
Start: 2021-12-20 | End: 2021-12-29

## 2021-12-20 RX ORDER — PRAVASTATIN SODIUM 20 MG
20 TABLET ORAL AT BEDTIME
Qty: 30 TABLET | Refills: 0 | Status: SHIPPED | OUTPATIENT
Start: 2021-12-20 | End: 2021-12-29

## 2021-12-20 RX ORDER — BUPROPION HYDROCHLORIDE 150 MG/1
150 TABLET ORAL EVERY MORNING
Qty: 30 TABLET | Refills: 0 | Status: SHIPPED | OUTPATIENT
Start: 2021-12-20 | End: 2021-12-29

## 2021-12-20 RX ORDER — LOSARTAN POTASSIUM 100 MG/1
100 TABLET ORAL DAILY
Qty: 30 TABLET | Refills: 0 | Status: SHIPPED | OUTPATIENT
Start: 2021-12-20 | End: 2021-12-29

## 2021-12-20 RX ORDER — AMLODIPINE BESYLATE 5 MG/1
5 TABLET ORAL DAILY
Qty: 30 TABLET | Refills: 0 | Status: SHIPPED | OUTPATIENT
Start: 2021-12-20 | End: 2021-12-29

## 2021-12-20 NOTE — TELEPHONE ENCOUNTER
Pt scheduled appt for 12/29. She asked for 30 day to be sent in. Please send in if appropriate.    Keyonna Garcia is requesting a refill of:    Pending Prescriptions:                       Disp   Refills    amLODIPine (NORVASC) 5 MG tablet          30 tab*0            Sig: Take 1 tablet (5 mg) by mouth daily    buPROPion (WELLBUTRIN XL) 150 MG 24 hr ta*30 tab*0            Sig: Take 1 tablet (150 mg) by mouth every morning    hydrochlorothiazide (HYDRODIURIL) 25 MG t*30 tab*0            Sig: Take 1 tablet (25 mg) by mouth daily    losartan (COZAAR) 100 MG tablet           30 tab*0            Sig: Take 1 tablet (100 mg) by mouth daily    montelukast (SINGULAIR) 10 MG tablet      30 tab*0            Sig: Take 1 tablet (10 mg) by mouth At Bedtime    omeprazole (PRILOSEC) 20 MG DR capsule    30 cap*0            Sig: TAKE 1 CAPSULE BY MOUTH ONE TIME DAILY    pravastatin (PRAVACHOL) 20 MG tablet      30 tab*0            Sig: Take 1 tablet (20 mg) by mouth At Bedtime  Refused Prescriptions:                       Disp   Refills    amLODIPine (NORVASC) 5 MG tablet [Pharmacy*90 tab*0        Sig: TAKE ONE TABLET BY MOUTH ONE TIME DAILY  Refused By: NATHANAEL GIRALDO  Reason for Refusal: Patient needs appointment    pravastatin (PRAVACHOL) 20 MG tablet [Phar*90 tab*0        Sig: TAKE 1 TABLET BY MOUTH EVERY NIGHT AT BEDTIME  Refused By: NATHANAEL GIRALDO  Reason for Refusal: Patient needs appointment    omeprazole (PRILOSEC) 20 MG DR capsule [Ph*90 cap*0        Sig: TAKE ONE CAPSULE BY MOUTH ONE TIME DAILY  Refused By: ANTHANAEL GIRALDO  Reason for Refusal: Patient needs appointment    montelukast (SINGULAIR) 10 MG tablet [Phar*90 tab*0        Sig: TAKE ONE TABLET BY MOUTH AT BEDTIME  Refused By: NATHANAEL GIRALDO  Reason for Refusal: Patient needs appointment    losartan (COZAAR) 100 MG tablet [Pharmacy *90 tab*0        Sig: TAKE ONE TABLET BY MOUTH ONE TIME DAILY  Refused By: NATHANAEL GIRALDO  Reason for Refusal: Patient needs  appointment    hydrochlorothiazide (HYDRODIURIL) 25 MG ta*90 tab*0        Sig: TAKE ONE TABLET BY MOUTH ONE TIME DAILY  Refused By: NATHANAEL GIRALDO  Reason for Refusal: Patient needs appointment    buPROPion (WELLBUTRIN XL) 150 MG 24 hr tab*90 tab*0        Sig: TAKE ONE TABLET BY MOUTH IN THE MORNING  Refused By: NATHANAEL GIRALDO  Reason for Refusal: Patient needs appointment

## 2021-12-29 ENCOUNTER — OFFICE VISIT (OUTPATIENT)
Dept: FAMILY MEDICINE | Facility: CLINIC | Age: 71
End: 2021-12-29

## 2021-12-29 VITALS
HEART RATE: 74 BPM | WEIGHT: 183 LBS | HEIGHT: 63 IN | DIASTOLIC BLOOD PRESSURE: 76 MMHG | BODY MASS INDEX: 32.43 KG/M2 | RESPIRATION RATE: 22 BRPM | SYSTOLIC BLOOD PRESSURE: 130 MMHG | TEMPERATURE: 97.8 F | OXYGEN SATURATION: 97 %

## 2021-12-29 DIAGNOSIS — Z12.31 ENCOUNTER FOR SCREENING MAMMOGRAM FOR BREAST CANCER: ICD-10-CM

## 2021-12-29 DIAGNOSIS — Z85.828 HISTORY OF BASAL CELL CARCINOMA: ICD-10-CM

## 2021-12-29 DIAGNOSIS — N18.31 CHRONIC KIDNEY DISEASE, STAGE 3A (H): ICD-10-CM

## 2021-12-29 DIAGNOSIS — E78.2 MIXED HYPERLIPIDEMIA: Primary | ICD-10-CM

## 2021-12-29 DIAGNOSIS — R73.01 ELEVATED FASTING GLUCOSE: ICD-10-CM

## 2021-12-29 DIAGNOSIS — K21.9 GASTROESOPHAGEAL REFLUX DISEASE WITHOUT ESOPHAGITIS: ICD-10-CM

## 2021-12-29 DIAGNOSIS — H10.45 CHRONIC ALLERGIC CONJUNCTIVITIS: ICD-10-CM

## 2021-12-29 DIAGNOSIS — Z12.11 SPECIAL SCREENING FOR MALIGNANT NEOPLASMS, COLON: ICD-10-CM

## 2021-12-29 DIAGNOSIS — I10 ESSENTIAL HYPERTENSION, BENIGN: ICD-10-CM

## 2021-12-29 DIAGNOSIS — Z87.891 PERSONAL HISTORY OF TOBACCO USE, PRESENTING HAZARDS TO HEALTH: ICD-10-CM

## 2021-12-29 DIAGNOSIS — M85.80 OSTEOPENIA, UNSPECIFIED LOCATION: ICD-10-CM

## 2021-12-29 DIAGNOSIS — E66.811 OBESITY (BMI 30.0-34.9): ICD-10-CM

## 2021-12-29 DIAGNOSIS — F43.23 ADJUSTMENT DISORDER WITH MIXED ANXIETY AND DEPRESSED MOOD: ICD-10-CM

## 2021-12-29 LAB
ALBUMIN SERPL-MCNC: 4.4 G/DL (ref 3.6–5.1)
ALBUMIN/GLOB SERPL: 1.5 {RATIO} (ref 1–2.5)
ALP SERPL-CCNC: 55 U/L (ref 33–130)
ALT 1742-6: 14 U/L (ref 0–32)
AST 1920-8: 17 U/L (ref 0–35)
BILIRUB SERPL-MCNC: 0.6 MG/DL (ref 0.2–1.2)
BUN SERPL-MCNC: 20 MG/DL (ref 7–25)
BUN/CREATININE RATIO: 17.2 (ref 6–22)
CALCIUM SERPL-MCNC: 9.6 MG/DL (ref 8.6–10.3)
CHLORIDE SERPLBLD-SCNC: 104.2 MMOL/L (ref 98–110)
CHOLEST SERPL-MCNC: 246 MG/DL (ref 0–199)
CHOLEST/HDLC SERPL: 3 {RATIO} (ref 0–5)
CO2 SERPL-SCNC: 26.6 MMOL/L (ref 20–32)
CREAT SERPL-MCNC: 1.16 MG/DL (ref 0.6–1.3)
GLOBULIN, CALCULATED - QUEST: 2.9 (ref 1.9–3.7)
GLUCOSE SERPL-MCNC: 110 MG/DL (ref 60–99)
HBA1C MFR BLD: 5.8 % (ref 4–7)
HDLC SERPL-MCNC: 79 MG/DL (ref 40–150)
LDLC SERPL CALC-MCNC: 130 MG/DL (ref 0–130)
POTASSIUM SERPL-SCNC: 4.44 MMOL/L (ref 3.5–5.3)
PROT SERPL-MCNC: 7.3 G/DL (ref 6.1–8.1)
SODIUM SERPL-SCNC: 141 MMOL/L (ref 135–146)
TRIGL SERPL-MCNC: 184 MG/DL (ref 0–149)

## 2021-12-29 PROCEDURE — 80061 LIPID PANEL: CPT | Performed by: PHYSICIAN ASSISTANT

## 2021-12-29 PROCEDURE — 83036 HEMOGLOBIN GLYCOSYLATED A1C: CPT | Performed by: PHYSICIAN ASSISTANT

## 2021-12-29 PROCEDURE — 80053 COMPREHEN METABOLIC PANEL: CPT | Performed by: PHYSICIAN ASSISTANT

## 2021-12-29 PROCEDURE — 36415 COLL VENOUS BLD VENIPUNCTURE: CPT | Performed by: PHYSICIAN ASSISTANT

## 2021-12-29 PROCEDURE — 82306 VITAMIN D 25 HYDROXY: CPT | Mod: 90 | Performed by: PHYSICIAN ASSISTANT

## 2021-12-29 PROCEDURE — 99214 OFFICE O/P EST MOD 30 MIN: CPT | Performed by: PHYSICIAN ASSISTANT

## 2021-12-29 RX ORDER — PRAVASTATIN SODIUM 20 MG
20 TABLET ORAL AT BEDTIME
Qty: 90 TABLET | Refills: 1 | Status: SHIPPED | OUTPATIENT
Start: 2021-12-29 | End: 2022-07-22

## 2021-12-29 RX ORDER — BUPROPION HYDROCHLORIDE 150 MG/1
150 TABLET ORAL EVERY MORNING
Qty: 90 TABLET | Refills: 1 | Status: SHIPPED | OUTPATIENT
Start: 2021-12-29 | End: 2022-07-22

## 2021-12-29 RX ORDER — MONTELUKAST SODIUM 10 MG/1
10 TABLET ORAL AT BEDTIME
Qty: 90 TABLET | Refills: 3 | Status: SHIPPED | OUTPATIENT
Start: 2021-12-29 | End: 2023-01-03

## 2021-12-29 RX ORDER — AMLODIPINE BESYLATE 5 MG/1
5 TABLET ORAL DAILY
Qty: 90 TABLET | Refills: 1 | Status: SHIPPED | OUTPATIENT
Start: 2021-12-29 | End: 2022-07-22

## 2021-12-29 RX ORDER — HYDROCHLOROTHIAZIDE 25 MG/1
25 TABLET ORAL DAILY
Qty: 90 TABLET | Refills: 1 | Status: SHIPPED | OUTPATIENT
Start: 2021-12-29 | End: 2022-07-22

## 2021-12-29 RX ORDER — LOSARTAN POTASSIUM 100 MG/1
100 TABLET ORAL DAILY
Qty: 90 TABLET | Refills: 1 | Status: SHIPPED | OUTPATIENT
Start: 2021-12-29 | End: 2022-07-22

## 2021-12-29 ASSESSMENT — PATIENT HEALTH QUESTIONNAIRE - PHQ9
SUM OF ALL RESPONSES TO PHQ QUESTIONS 1-9: 5
5. POOR APPETITE OR OVEREATING: SEVERAL DAYS

## 2021-12-29 ASSESSMENT — ANXIETY QUESTIONNAIRES
2. NOT BEING ABLE TO STOP OR CONTROL WORRYING: NOT AT ALL
3. WORRYING TOO MUCH ABOUT DIFFERENT THINGS: SEVERAL DAYS
6. BECOMING EASILY ANNOYED OR IRRITABLE: NOT AT ALL
IF YOU CHECKED OFF ANY PROBLEMS ON THIS QUESTIONNAIRE, HOW DIFFICULT HAVE THESE PROBLEMS MADE IT FOR YOU TO DO YOUR WORK, TAKE CARE OF THINGS AT HOME, OR GET ALONG WITH OTHER PEOPLE: NOT DIFFICULT AT ALL
GAD7 TOTAL SCORE: 4
7. FEELING AFRAID AS IF SOMETHING AWFUL MIGHT HAPPEN: NOT AT ALL
1. FEELING NERVOUS, ANXIOUS, OR ON EDGE: SEVERAL DAYS
5. BEING SO RESTLESS THAT IT IS HARD TO SIT STILL: SEVERAL DAYS

## 2021-12-29 ASSESSMENT — MIFFLIN-ST. JEOR: SCORE: 1316.27

## 2021-12-29 NOTE — NURSING NOTE
Chief Complaint   Patient presents with     Recheck Medication     fasting blood pressure medication check and review     Pre-visit Screening:  Immunizations:  up to date  Colonoscopy:  is up to date  Mammogram: is due and ordered today  Asthma Action Test/Plan:  NA  PHQ9:  Given today   GAD7:  Given today   Questioned patient about current smoking habits Pt. Current someday smoker.   Ok to leave detailed message on voice mail for today's visit only Yes, phone # [phone]

## 2021-12-29 NOTE — PROGRESS NOTES
Assessment & Plan     Mixed hyperlipidemia    - VENOUS COLLECTION  - Comprehensive Metobolic Panel (BFP)  - Lipid Panel (BFP)  - pravastatin (PRAVACHOL) 20 MG tablet  Dispense: 90 tablet; Refill: 1    Chronic kidney disease, stage 3a (H)   VENOUS COLLECTION  - Comprehensive Metobolic Panel (BFP)  - Lipid Panel (BFP)    Elevated fasting glucose    - VENOUS COLLECTION  - Comprehensive Metobolic Panel (BFP)    Essential hypertension, benign    - VENOUS COLLECTION  - Comprehensive Metobolic Panel (BFP)  - amLODIPine (NORVASC) 5 MG tablet  Dispense: 90 tablet; Refill: 1  - hydrochlorothiazide (HYDRODIURIL) 25 MG tablet  Dispense: 90 tablet; Refill: 1  - losartan (COZAAR) 100 MG tablet  Dispense: 90 tablet; Refill: 1    Adjustment disorder with mixed anxiety and depressed mood    - buPROPion (WELLBUTRIN XL) 150 MG 24 hr tablet  Dispense: 90 tablet; Refill: 1    Gastroesophageal reflux disease without esophagitis    - omeprazole (PRILOSEC) 20 MG DR capsule  Dispense: 90 capsule; Refill: 3    History of basal cell carcinoma      Obesity (BMI 30.0-34.9)    - VENOUS COLLECTION  - Comprehensive Metobolic Panel (BFP)  - Lipid Panel (BFP)    Osteopenia, repeat DEXA 2022    - VENOUS COLLECTION  - VITAMIN D DEFICIENCY SCREENING ([facility])    Personal history of tobacco use, presenting hazards to health    - Radiology Referral  - CT Chest Lung Cancer Scrn Low Dose wo    Encounter for screening mammogram for breast cancer    - Mammo Screening digital (bilat)  - Radiology Referral    Chronic allergic conjunctivitis    - montelukast (SINGULAIR) 10 MG tablet  Dispense: 90 tablet; Refill: 3      NOT READY TO QUIT SMOKING  WILL WORK ON WEIGHT LOSS    Follow-up 6 MONTHS FASTING GLUCOSE CHECK and weight        TEENA Gastelum  [facility]    Subjective     Nursing Notes:   Cheyenne Kaur CMA  12/29/2021  9:18 AM  Signed  Chief Complaint   Patient presents with     Recheck Medication     fasting blood pressure  medication check and review     Pre-visit Screening:  Immunizations:  up to date  Colonoscopy:  is up to date  Mammogram: is due and ordered today  Asthma Action Test/Plan:  NA  PHQ9:  Given today   GAD7:  Given today   Questioned patient about current smoking habits Pt. Current someday smoker.   Ok to leave detailed message on voice mail for today's visit only Yes, phone # [phone]                 Keyonna Garcia is a 71 year old female who presents to clinic today for the following health issues     HPI     Wt Readings from Last 5 Encounters:   12/29/21 83 kg (183 lb)   05/28/21 79.2 kg (174 lb 9.6 oz)   08/28/20 83 kg (183 lb)   08/03/19 80.6 kg (177 lb 9.6 oz)   07/17/18 80.7 kg (178 lb)         GERD  - Prilosec - daily    Seeing Derm for BCC hx  - last year appt.     Smoking - 1/2 ppd    18 years  10 pack       Osteopenia - DEXA due 2022 *Check Vit D    Hyperlipidemia Follow-Up      Are you regularly taking any medication or supplement to lower your cholesterol?   Yes- Pravastating 20 mg    Are you having muscle aches or other side effects that you think could be caused by your cholesterol lowering medication?  No    Hypertension Follow-up      Do you check your blood pressure regularly outside of the clinic? No     Are you following a low salt diet? Yes      Depression and Anxiety Follow-Up    How are you doing with your depression since your last visit? No change    How are you doing with your anxiety since your last visit?  No change    Are you having other symptoms that might be associated with depression or anxiety? No    Have you had a significant life event? No     Do you have any concerns with your use of alcohol or other drugs? No    Social History     Tobacco Use     Smoking status: Current Some Day Smoker     Packs/day: 1.00     Years: 15.00     Pack years: 15.00     Types: Cigarettes     Smokeless tobacco: Never Used   Substance Use Topics     Alcohol use: Yes     Alcohol/week: 0.0 standard  drinks     Comment: 1 drink monthly     Drug use: No     PHQ 7/17/2018 8/3/2019 8/28/2020   PHQ-9 Total Score 4 3 5   Q9: Thoughts of better off dead/self-harm past 2 weeks Not at all Not at all Not at all     HOWARD-7 SCORE 7/17/2018 8/3/2019 8/28/2020   Total Score 6 4 2     Last PHQ-9 8/28/2020   1.  Little interest or pleasure in doing things 0   2.  Feeling down, depressed, or hopeless 1   3.  Trouble falling or staying asleep, or sleeping too much 0   4.  Feeling tired or having little energy 1   5.  Poor appetite or overeating 1   6.  Feeling bad about yourself 1   7.  Trouble concentrating 1   8.  Moving slowly or restless 0   Q9: Thoughts of better off dead/self-harm past 2 weeks 0   PHQ-9 Total Score 5   Difficulty at work, home, or with people Not difficult at all     HOWARD-7  8/28/2020   1. Feeling nervous, anxious, or on edge 1   2. Not being able to stop or control worrying 0   3. Worrying too much about different things 0   4. Trouble relaxing 0   5. Being so restless that it is hard to sit still 1   6. Becoming easily annoyed or irritable 0   7. Feeling afraid, as if something awful might happen 0   HOWARD-7 Total Score 2   If you checked any problems, how difficult have they made it for you to do your work, take care of things at home, or get along with other people? Not difficult at all       Suicide Assessment Five-step Evaluation and Treatment (SAFE-T)            Current Medications  Current Outpatient Medications   Medication Sig Dispense Refill     amLODIPine (NORVASC) 5 MG tablet Take 1 tablet (5 mg) by mouth daily 90 tablet 1     buPROPion (WELLBUTRIN XL) 150 MG 24 hr tablet Take 1 tablet (150 mg) by mouth every morning 90 tablet 1     cetirizine (ZYRTEC) 10 MG tablet Take 5 mg by mouth daily       hydrochlorothiazide (HYDRODIURIL) 25 MG tablet Take 1 tablet (25 mg) by mouth daily 90 tablet 1     ketoconazole (NIZORAL) 2 % cream Apply to AA BID PRN 60 g 3     losartan (COZAAR) 100 MG tablet Take 1  "tablet (100 mg) by mouth daily 90 tablet 1     montelukast (SINGULAIR) 10 MG tablet Take 1 tablet (10 mg) by mouth At Bedtime 90 tablet 3     omeprazole (PRILOSEC) 20 MG DR capsule TAKE 1 CAPSULE BY MOUTH ONE TIME DAILY 90 capsule 3     pravastatin (PRAVACHOL) 20 MG tablet Take 1 tablet (20 mg) by mouth At Bedtime 90 tablet 1         Constitutional, HEENT, Cardiovascular, Pulmonary, GI and  systems are negative, except as otherwise noted.          Objective    /76 (BP Location: Right arm, Patient Position: Sitting, Cuff Size: Adult Large)   Pulse 74   Temp 97.8  F (36.6  C) (Temporal)   Resp 22   Ht 1.588 m (5' 2.5\")   Wt 83 kg (183 lb)   LMP  (LMP Unknown)   SpO2 97%   BMI 32.94 kg/m    Body mass index is 32.94 kg/m .  Physical Exam   GENERAL: healthy, alert and no distress  EYES: Eyes grossly normal to inspection, PERRL and conjunctivae and sclerae normal  HENT: ear canals and TM's normal, nose and mouth without ulcers or lesions  NECK: no adenopathy, no asymmetry, masses, or scars and thyroid normal to palpation  RESP: lungs clear to auscultation - no rales, rhonchi or wheezes  CV: regular rate and rhythm, normal S1 S2, no S3 or S4, no murmur, click or rub, no peripheral edema and peripheral pulses strong  ABDOMEN: soft, nontender, no hepatosplenomegaly, no masses and bowel sounds normal  MS: no gross musculoskeletal defects noted, no edema        "

## 2021-12-30 PROBLEM — E55.9 VITAMIN D DEFICIENCY: Status: ACTIVE | Noted: 2021-12-30

## 2021-12-30 LAB — VITAMIN D, 25-OH, TOTAL - QUEST: 15 NG/ML (ref 30–100)

## 2021-12-30 ASSESSMENT — ANXIETY QUESTIONNAIRES: GAD7 TOTAL SCORE: 4

## 2022-01-18 ENCOUNTER — TELEPHONE (OUTPATIENT)
Dept: FAMILY MEDICINE | Facility: CLINIC | Age: 72
End: 2022-01-18

## 2022-01-18 DIAGNOSIS — R93.89 ABNORMAL CT SCAN, NECK: ICD-10-CM

## 2022-01-18 DIAGNOSIS — R93.89 THYROID WITH HETEROGENEOUS ECHOTEXTURE DETERMINED BY ULTRASOUND: Primary | ICD-10-CM

## 2022-01-18 NOTE — TELEPHONE ENCOUNTER
Please call pt  Lung cancer screening showed no concerning finding in lungs.  She has a hiatal hernia - already on omeprazole for this.     The thyroid looked slightly abnormal  I'd like to get labs and thyroid ultrasound    Please fax orders, call pt and have her come in for lab only ant to schedule thyroid ultrasound      Close encounter after speaking with her please.    Thanks,  Barbra Puga PA-C

## 2022-01-26 DIAGNOSIS — R93.89 THYROID WITH HETEROGENEOUS ECHOTEXTURE DETERMINED BY ULTRASOUND: ICD-10-CM

## 2022-01-26 DIAGNOSIS — Z87.891 PERSONAL HISTORY OF TOBACCO USE, PRESENTING HAZARDS TO HEALTH: ICD-10-CM

## 2022-01-26 DIAGNOSIS — R93.89 ABNORMAL CT SCAN, NECK: ICD-10-CM

## 2022-01-26 PROCEDURE — 84443 ASSAY THYROID STIM HORMONE: CPT | Mod: 90 | Performed by: PHYSICIAN ASSISTANT

## 2022-01-26 PROCEDURE — 36415 COLL VENOUS BLD VENIPUNCTURE: CPT | Performed by: PHYSICIAN ASSISTANT

## 2022-01-26 PROCEDURE — 84439 ASSAY OF FREE THYROXINE: CPT | Mod: 90 | Performed by: PHYSICIAN ASSISTANT

## 2022-01-27 LAB
T4, FREE, NON-DIALYSIS - QUEST: 1.1 NG/DL (ref 0.8–1.8)
TSH SERPL-ACNC: 6.66 MIU/L (ref 0.4–4.5)

## 2022-01-28 PROBLEM — E03.8 SUBCLINICAL HYPOTHYROIDISM: Status: ACTIVE | Noted: 2022-01-28

## 2022-03-14 ENCOUNTER — TELEPHONE (OUTPATIENT)
Dept: DERMATOLOGY | Facility: CLINIC | Age: 72
End: 2022-03-14
Payer: COMMERCIAL

## 2022-03-14 DIAGNOSIS — L71.9 ROSACEA: Primary | ICD-10-CM

## 2022-03-14 NOTE — TELEPHONE ENCOUNTER
[facility] Call Center    Phone Message    May a detailed message be left on voicemail: yes     Reason for Call: Medication Refill Request    Has the patient contacted the pharmacy for the refill? Yes   Name of medication being requested:  metroconazole cream   Provider who prescribed the medication: Duke DICKINSON  Pharmacy: [id number] - [place], [place] - [address]  Date medication is needed: Pt scheduled 06/24 and wait listed. Pt will need a refill until Appt. Thanks         Action Taken: Message routed to:  [facility] Derm    Travel Screening: Not Applicable

## 2022-03-14 NOTE — TELEPHONE ENCOUNTER
Pended.    Thank you,    Janee GORDONRN BSN  [facility]- [phone]

## 2022-05-15 ENCOUNTER — OFFICE VISIT (OUTPATIENT)
Dept: URGENT CARE | Facility: URGENT CARE | Age: 72
End: 2022-05-15
Payer: COMMERCIAL

## 2022-05-15 VITALS
OXYGEN SATURATION: 96 % | HEART RATE: 83 BPM | SYSTOLIC BLOOD PRESSURE: 131 MMHG | RESPIRATION RATE: 20 BRPM | BODY MASS INDEX: 32.76 KG/M2 | DIASTOLIC BLOOD PRESSURE: 76 MMHG | TEMPERATURE: 98.4 F | WEIGHT: 182 LBS

## 2022-05-15 DIAGNOSIS — R07.0 THROAT PAIN: Primary | ICD-10-CM

## 2022-05-15 DIAGNOSIS — J30.2 SEASONAL ALLERGIES: ICD-10-CM

## 2022-05-15 DIAGNOSIS — R05.9 COUGH: ICD-10-CM

## 2022-05-15 LAB
DEPRECATED S PYO AG THROAT QL EIA: NEGATIVE
GROUP A STREP BY PCR: NOT DETECTED
SARS-COV-2 RNA RESP QL NAA+PROBE: NEGATIVE

## 2022-05-15 PROCEDURE — U0005 INFEC AGEN DETEC AMPLI PROBE: HCPCS | Performed by: PREVENTIVE MEDICINE

## 2022-05-15 PROCEDURE — 99214 OFFICE O/P EST MOD 30 MIN: CPT | Performed by: PREVENTIVE MEDICINE

## 2022-05-15 PROCEDURE — U0003 INFECTIOUS AGENT DETECTION BY NUCLEIC ACID (DNA OR RNA); SEVERE ACUTE RESPIRATORY SYNDROME CORONAVIRUS 2 (SARS-COV-2) (CORONAVIRUS DISEASE [COVID-19]), AMPLIFIED PROBE TECHNIQUE, MAKING USE OF HIGH THROUGHPUT TECHNOLOGIES AS DESCRIBED BY CMS-2020-01-R: HCPCS | Performed by: PREVENTIVE MEDICINE

## 2022-05-15 PROCEDURE — 87651 STREP A DNA AMP PROBE: CPT | Performed by: PREVENTIVE MEDICINE

## 2022-05-15 RX ORDER — BENZONATATE 100 MG/1
100 CAPSULE ORAL 3 TIMES DAILY PRN
Qty: 30 CAPSULE | Refills: 0 | Status: SHIPPED | OUTPATIENT
Start: 2022-05-15 | End: 2022-08-02

## 2022-05-15 RX ORDER — FLUTICASONE PROPIONATE 50 MCG
2 SPRAY, SUSPENSION (ML) NASAL DAILY
Qty: 9.9 ML | Refills: 0 | Status: SHIPPED | OUTPATIENT
Start: 2022-05-15

## 2022-05-15 NOTE — PROGRESS NOTES
Assessment & Plan     1. Seasonal allergies  - fluticasone (FLONASE) 50 MCG/ACT nasal spray; Spray 2 sprays into both nostrils daily  Dispense: 9.9 mL; Refill: 0  - benzonatate (TESSALON) 100 MG capsule; Take 1 capsule (100 mg) by mouth 3 times daily as needed for cough  Dispense: 30 capsule; Refill: 0  Increase zyrtec to 10 mg daily  Flonase 2 sprays per nostril daily  Tessalon perles as needed  COVID pending  Follow up in 10-14 days if not improving, sooner as needed       31 minutes spent on the date of the encounter doing chart review, review of test results, interpretation of tests, patient visit and documentation         No follow-ups on file.    Samuel Baeza MD  [facility]    Subjective     Keyonna Garcia is a 71 year old year old female who presents to clinic today for the following health issues:    Patient presents with:  Cough: All sx's for 4 days    This is a 70 yo female with a history of seasonal allergies.  She has had runny nose, post nasal drip, dry cough for 4 days.  Usually takes zytrec 5 mg every other day year round due to allergies.  No fever or chills, sob, cp, sick contacts, fever, facial discomfort, ear pain.    Patient Active Problem List   Diagnosis     Essential hypertension, benign     Mixed hyperlipidemia     Adjustment disorder with mixed anxiety and depressed mood     Health Care Home     Gastroesophageal reflux disease without esophagitis     ACP (advance care planning)     Seborrheic dermatitis     Status post right hip replacement     Aftercare following right hip joint replacement surgery     Presence of right hip implant     History of basal cell carcinoma     Osteopenia, repeat DEXA 2022     Obesity (BMI 30.0-34.9)     Elevated fasting glucose     Personal history of tobacco use, presenting hazards to health     Chronic kidney disease, stage 3a (H)     Vitamin D deficiency     Subclinical hypothyroidism       Current Outpatient Medications    Medication     amLODIPine (NORVASC) 5 MG tablet     benzonatate (TESSALON) 100 MG capsule     buPROPion (WELLBUTRIN XL) 150 MG 24 hr tablet     cetirizine (ZYRTEC) 10 MG tablet     fluticasone (FLONASE) 50 MCG/ACT nasal spray     hydrochlorothiazide (HYDRODIURIL) 25 MG tablet     ketoconazole (NIZORAL) 2 % cream     losartan (COZAAR) 100 MG tablet     metroNIDAZOLE (METROCREAM) 0.75 % external cream     montelukast (SINGULAIR) 10 MG tablet     omeprazole (PRILOSEC) 20 MG DR capsule     pravastatin (PRAVACHOL) 20 MG tablet     No current facility-administered medications for this visit.       Past Medical History:   Diagnosis Date     Arthritis 2008    left finger surgery     Basal cell carcinoma      Essential hypertension, benign 7/7/2011     Mixed hyperlipidemia 7/7/2011     Tobacco use disorder 7/7/2011       Social History   reports that she has been smoking cigarettes. She has a 15.00 pack-year smoking history. She has never used smokeless tobacco. She reports current alcohol use. She reports that she does not use drugs.    Family History   Problem Relation Age of Onset     Hypertension Mother      Lipids Mother      Osteoarthritis Mother      Hypertension Father      Lipids Father      Osteoarthritis Father      C.A.D. No family hx of      Diabetes No family hx of      Breast Cancer No family hx of      Cancer - colorectal No family hx of        Review of Systems  Constitutional, HEENT, cardiovascular, pulmonary, GI, , musculoskeletal, neuro, skin, endocrine and psych systems are negative, except as otherwise noted.      Objective    /76   Pulse 83   Temp 98.4  F (36.9  C)   Resp 20   Wt 82.6 kg (182 lb)   LMP  (LMP Unknown)   SpO2 96%   BMI 32.76 kg/m    Physical Exam   GENERAL: healthy, alert and no distress  EYES: Eyes grossly normal to inspection, PERRL and conjunctivae and sclerae normal  HENT: ear canals and TM's normal, nose with congestion, and mouth without ulcers or lesions  NECK: no  adenopathy, no asymmetry, masses, or scars and thyroid normal to palpation  RESP: lungs clear to auscultation - no rales, rhonchi or wheezes  CV: regular rate and rhythm, normal S1 S2, no S3 or S4, no murmur, click or rub, no peripheral edema and peripheral pulses strong  MS: no gross musculoskeletal defects noted, no edema  SKIN: no suspicious lesions or rashes  NEURO: Normal strength and tone, mentation intact and speech normal  PSYCH: mentation appears normal, affect normal/bright    Strep negative

## 2022-06-05 ENCOUNTER — HEALTH MAINTENANCE LETTER (OUTPATIENT)
Age: 72
End: 2022-06-05

## 2022-06-24 ENCOUNTER — OFFICE VISIT (OUTPATIENT)
Dept: DERMATOLOGY | Facility: CLINIC | Age: 72
End: 2022-06-24
Payer: COMMERCIAL

## 2022-06-24 DIAGNOSIS — L21.9 DERMATITIS, SEBORRHEIC: ICD-10-CM

## 2022-06-24 PROCEDURE — 99213 OFFICE O/P EST LOW 20 MIN: CPT | Performed by: PHYSICIAN ASSISTANT

## 2022-06-24 RX ORDER — KETOCONAZOLE 20 MG/G
CREAM TOPICAL
Qty: 60 G | Refills: 11 | Status: SHIPPED | OUTPATIENT
Start: 2022-06-24

## 2022-06-24 ASSESSMENT — PAIN SCALES - GENERAL: PAINLEVEL: NO PAIN (0)

## 2022-06-24 NOTE — LETTER
6/24/2022         RE: Keyonna Garcia  [address]  [place] [address]        Dear Colleague,    Thank you for referring your patient, Keyonna Garcia, to the [facility]. Please see a copy of my visit note below.    HPI:   Chief complaints: Keyonna Garcia is a pleasant 71 year old female who presents for recheck of seborrheic dermatitis on the face. She was using ketoconazole cream for this which was helpful. She ran out of this and has been flaring.       PHYSICAL EXAM:    LMP  (LMP Unknown)   Skin exam performed as follows: Type 2 skin. Mood appropriate  Alert and Oriented X 3. Well developed, well nourished in no distress.  General appearance: Normal  Head including face: Normal  Eyes: conjunctiva and lids: Normal  Mouth: Lips, teeth, gums: Normal  Neck: Normal  Cardiovascular: Exam of peripheral vascular system by observation for swelling, varicosities, edema: Normal  Right upper extremity: Normal  Left upper extremity: Normal  Right lower extremity: Normal  Left lower extremity: Normal  Skin: Scalp and body hair: See below    Erythema and scale on the glabella, nasal creases    ASSESSMENT/PLAN:     1. Seborrheic dermatitis  --Restart ketoconazole cream BID PRN          Follow-up: yearly/PRN sooner  CC:   Scribed By: Cheyenne Wall, MS, PADAWIT          Again, thank you for allowing me to participate in the care of your patient.        Sincerely,        Cheyenne Wall PA-C

## 2022-06-24 NOTE — PROGRESS NOTES
HPI:   Chief complaints: Keyonna Garcia is a pleasant 71 year old female who presents for recheck of seborrheic dermatitis on the face. She was using ketoconazole cream for this which was helpful. She ran out of this and has been flaring.       PHYSICAL EXAM:    LMP  (LMP Unknown)   Skin exam performed as follows: Type 2 skin. Mood appropriate  Alert and Oriented X 3. Well developed, well nourished in no distress.  General appearance: Normal  Head including face: Normal  Eyes: conjunctiva and lids: Normal  Mouth: Lips, teeth, gums: Normal  Neck: Normal  Cardiovascular: Exam of peripheral vascular system by observation for swelling, varicosities, edema: Normal  Right upper extremity: Normal  Left upper extremity: Normal  Right lower extremity: Normal  Left lower extremity: Normal  Skin: Scalp and body hair: See below    Erythema and scale on the glabella, nasal creases    ASSESSMENT/PLAN:     1. Seborrheic dermatitis  --Restart ketoconazole cream BID PRN          Follow-up: yearly/PRN sooner  CC:   Scribed By: Cheyenne Wall MS, PA-C

## 2022-07-21 ENCOUNTER — MYC MEDICAL ADVICE (OUTPATIENT)
Dept: FAMILY MEDICINE | Facility: CLINIC | Age: 72
End: 2022-07-21

## 2022-07-21 DIAGNOSIS — E78.2 MIXED HYPERLIPIDEMIA: ICD-10-CM

## 2022-07-21 DIAGNOSIS — I10 ESSENTIAL HYPERTENSION, BENIGN: ICD-10-CM

## 2022-07-21 DIAGNOSIS — F43.23 ADJUSTMENT DISORDER WITH MIXED ANXIETY AND DEPRESSED MOOD: ICD-10-CM

## 2022-07-21 RX ORDER — LOSARTAN POTASSIUM 100 MG/1
TABLET ORAL
Qty: 90 TABLET | Refills: 0 | COMMUNITY
Start: 2022-07-21

## 2022-07-21 RX ORDER — BUPROPION HYDROCHLORIDE 150 MG/1
TABLET ORAL
Qty: 90 TABLET | Refills: 0 | COMMUNITY
Start: 2022-07-21

## 2022-07-21 RX ORDER — PRAVASTATIN SODIUM 20 MG
TABLET ORAL
Qty: 90 TABLET | Refills: 0 | COMMUNITY
Start: 2022-07-21

## 2022-07-21 RX ORDER — AMLODIPINE BESYLATE 5 MG/1
TABLET ORAL
Qty: 90 TABLET | Refills: 0 | COMMUNITY
Start: 2022-07-21

## 2022-07-21 RX ORDER — HYDROCHLOROTHIAZIDE 25 MG/1
TABLET ORAL
Qty: 90 TABLET | Refills: 0 | COMMUNITY
Start: 2022-07-21

## 2022-07-21 NOTE — TELEPHONE ENCOUNTER
Keyonna Willon is requesting a refill of:    Refused Prescriptions:                       Disp   Refills    pravastatin (PRAVACHOL) 20 MG tablet [Phar*90 tab*0        Sig: TAKE ONE TABLET BY MOUTH AT BEDTIME  Refused By: NATHANAEL GIRALDO  Reason for Refusal: Patient needs appointment    amLODIPine (NORVASC) 5 MG tablet [Pharmacy*90 tab*0        Sig: TAKE ONE TABLET BY MOUTH ONE TIME DAILY  Refused By: NATHANAEL GIRALDO  Reason for Refusal: Patient needs appointment    hydrochlorothiazide (HYDRODIURIL) 25 MG ta*90 tab*0        Sig: TAKE ONE TABLET BY MOUTH ONE TIME DAILY  Refused By: NATHANAEL GIRALDO  Reason for Refusal: Patient needs appointment    losartan (COZAAR) 100 MG tablet [Pharmacy *90 tab*0        Sig: TAKE ONE TABLET BY MOUTH ONE TIME DAILY  Refused By: NATHANAEL GIRALDO  Reason for Refusal: Patient needs appointment    buPROPion (WELLBUTRIN XL) 150 MG 24 hr tab*90 tab*0        Sig: TAKE ONE TABLET BY MOUTH IN THE MORNING  Refused By: NATHANAEL GIRALDO  Reason for Refusal: Patient needs appointment    Pt due for FASTING OV. Sent [employer] message

## 2022-07-22 RX ORDER — AMLODIPINE BESYLATE 5 MG/1
5 TABLET ORAL DAILY
Qty: 14 TABLET | Refills: 0 | Status: SHIPPED | OUTPATIENT
Start: 2022-07-22 | End: 2022-08-02

## 2022-07-22 RX ORDER — HYDROCHLOROTHIAZIDE 25 MG/1
25 TABLET ORAL DAILY
Qty: 14 TABLET | Refills: 0 | Status: SHIPPED | OUTPATIENT
Start: 2022-07-22 | End: 2022-08-02

## 2022-07-22 RX ORDER — LOSARTAN POTASSIUM 100 MG/1
100 TABLET ORAL DAILY
Qty: 14 TABLET | Refills: 0 | Status: SHIPPED | OUTPATIENT
Start: 2022-07-22 | End: 2022-08-02

## 2022-07-22 RX ORDER — BUPROPION HYDROCHLORIDE 150 MG/1
150 TABLET ORAL EVERY MORNING
Qty: 14 TABLET | Refills: 0 | Status: SHIPPED | OUTPATIENT
Start: 2022-07-22 | End: 2022-08-02

## 2022-07-22 RX ORDER — PRAVASTATIN SODIUM 20 MG
20 TABLET ORAL AT BEDTIME
Qty: 14 TABLET | Refills: 0 | Status: SHIPPED | OUTPATIENT
Start: 2022-07-22 | End: 2022-08-02

## 2022-07-22 NOTE — TELEPHONE ENCOUNTER
Pt will be making an OV    Keyonna Willon is requesting a refill of:    Pending Prescriptions:                       Disp   Refills    buPROPion (WELLBUTRIN XL) 150 MG 24 hr ta*14 tab*0            Sig: Take 1 tablet (150 mg) by mouth every morning    amLODIPine (NORVASC) 5 MG tablet          14 tab*0            Sig: Take 1 tablet (5 mg) by mouth daily    hydrochlorothiazide (HYDRODIURIL) 25 MG t*14 tab*0            Sig: Take 1 tablet (25 mg) by mouth daily    losartan (COZAAR) 100 MG tablet           14 tab*0            Sig: Take 1 tablet (100 mg) by mouth daily    pravastatin (PRAVACHOL) 20 MG tablet      14 tab*0            Sig: Take 1 tablet (20 mg) by mouth At Bedtime

## 2022-08-02 ENCOUNTER — MYC MEDICAL ADVICE (OUTPATIENT)
Dept: FAMILY MEDICINE | Facility: CLINIC | Age: 72
End: 2022-08-02

## 2022-08-02 ENCOUNTER — OFFICE VISIT (OUTPATIENT)
Dept: FAMILY MEDICINE | Facility: CLINIC | Age: 72
End: 2022-08-02

## 2022-08-02 VITALS
TEMPERATURE: 98.3 F | WEIGHT: 183.4 LBS | DIASTOLIC BLOOD PRESSURE: 74 MMHG | HEIGHT: 63 IN | BODY MASS INDEX: 32.5 KG/M2 | SYSTOLIC BLOOD PRESSURE: 124 MMHG | HEART RATE: 70 BPM | OXYGEN SATURATION: 97 %

## 2022-08-02 DIAGNOSIS — R91.8 PULMONARY NODULES: ICD-10-CM

## 2022-08-02 DIAGNOSIS — E66.811 OBESITY (BMI 30.0-34.9): ICD-10-CM

## 2022-08-02 DIAGNOSIS — R73.01 ELEVATED FASTING GLUCOSE: Primary | ICD-10-CM

## 2022-08-02 DIAGNOSIS — E78.2 MIXED HYPERLIPIDEMIA: ICD-10-CM

## 2022-08-02 DIAGNOSIS — E55.9 VITAMIN D DEFICIENCY: ICD-10-CM

## 2022-08-02 DIAGNOSIS — N18.31 CHRONIC KIDNEY DISEASE, STAGE 3A (H): ICD-10-CM

## 2022-08-02 DIAGNOSIS — I10 ESSENTIAL HYPERTENSION, BENIGN: ICD-10-CM

## 2022-08-02 DIAGNOSIS — F43.23 ADJUSTMENT DISORDER WITH MIXED ANXIETY AND DEPRESSED MOOD: ICD-10-CM

## 2022-08-02 DIAGNOSIS — E03.8 SUBCLINICAL HYPOTHYROIDISM: ICD-10-CM

## 2022-08-02 DIAGNOSIS — M85.80 OSTEOPENIA, UNSPECIFIED LOCATION: ICD-10-CM

## 2022-08-02 DIAGNOSIS — Z87.891 PERSONAL HISTORY OF TOBACCO USE, PRESENTING HAZARDS TO HEALTH: ICD-10-CM

## 2022-08-02 DIAGNOSIS — K44.9 HIATAL HERNIA: ICD-10-CM

## 2022-08-02 PROBLEM — Z47.1 AFTERCARE FOLLOWING RIGHT HIP JOINT REPLACEMENT SURGERY: Status: RESOLVED | Noted: 2017-02-07 | Resolved: 2022-08-02

## 2022-08-02 PROBLEM — Z96.641 AFTERCARE FOLLOWING RIGHT HIP JOINT REPLACEMENT SURGERY: Status: RESOLVED | Noted: 2017-02-07 | Resolved: 2022-08-02

## 2022-08-02 PROBLEM — Z96.641 PRESENCE OF RIGHT HIP IMPLANT: Status: RESOLVED | Noted: 2017-02-07 | Resolved: 2022-08-02

## 2022-08-02 LAB
GLUCOSE SERPL-MCNC: 104 MG/DL (ref 60–99)
HBA1C MFR BLD: 6.2 % (ref 4–7)

## 2022-08-02 PROCEDURE — 36415 COLL VENOUS BLD VENIPUNCTURE: CPT | Performed by: PHYSICIAN ASSISTANT

## 2022-08-02 PROCEDURE — 82310 ASSAY OF CALCIUM: CPT | Mod: 90 | Performed by: PHYSICIAN ASSISTANT

## 2022-08-02 PROCEDURE — 99214 OFFICE O/P EST MOD 30 MIN: CPT | Performed by: PHYSICIAN ASSISTANT

## 2022-08-02 PROCEDURE — 86800 THYROGLOBULIN ANTIBODY: CPT | Mod: 90 | Performed by: PHYSICIAN ASSISTANT

## 2022-08-02 PROCEDURE — 82947 ASSAY GLUCOSE BLOOD QUANT: CPT | Performed by: PHYSICIAN ASSISTANT

## 2022-08-02 PROCEDURE — 83036 HEMOGLOBIN GLYCOSYLATED A1C: CPT | Performed by: PHYSICIAN ASSISTANT

## 2022-08-02 PROCEDURE — 84443 ASSAY THYROID STIM HORMONE: CPT | Mod: 90 | Performed by: PHYSICIAN ASSISTANT

## 2022-08-02 PROCEDURE — 84100 ASSAY OF PHOSPHORUS: CPT | Mod: 90 | Performed by: PHYSICIAN ASSISTANT

## 2022-08-02 PROCEDURE — 82306 VITAMIN D 25 HYDROXY: CPT | Mod: 90 | Performed by: PHYSICIAN ASSISTANT

## 2022-08-02 PROCEDURE — 83735 ASSAY OF MAGNESIUM: CPT | Mod: 90 | Performed by: PHYSICIAN ASSISTANT

## 2022-08-02 PROCEDURE — 83970 ASSAY OF PARATHORMONE: CPT | Mod: 90 | Performed by: PHYSICIAN ASSISTANT

## 2022-08-02 PROCEDURE — 86376 MICROSOMAL ANTIBODY EACH: CPT | Mod: 90 | Performed by: PHYSICIAN ASSISTANT

## 2022-08-02 PROCEDURE — 84439 ASSAY OF FREE THYROXINE: CPT | Mod: 90 | Performed by: PHYSICIAN ASSISTANT

## 2022-08-02 RX ORDER — HYDROCHLOROTHIAZIDE 25 MG/1
25 TABLET ORAL DAILY
Qty: 90 TABLET | Refills: 1 | Status: SHIPPED | OUTPATIENT
Start: 2022-08-02 | End: 2023-02-13

## 2022-08-02 RX ORDER — PRAVASTATIN SODIUM 20 MG
20 TABLET ORAL AT BEDTIME
Qty: 90 TABLET | Refills: 1 | Status: SHIPPED | OUTPATIENT
Start: 2022-08-02 | End: 2023-02-13

## 2022-08-02 RX ORDER — AMLODIPINE BESYLATE 5 MG/1
5 TABLET ORAL DAILY
Qty: 90 TABLET | Refills: 1 | Status: SHIPPED | OUTPATIENT
Start: 2022-08-02 | End: 2023-02-13

## 2022-08-02 RX ORDER — BUPROPION HYDROCHLORIDE 150 MG/1
150 TABLET ORAL EVERY MORNING
Qty: 90 TABLET | Refills: 1 | Status: SHIPPED | OUTPATIENT
Start: 2022-08-02 | End: 2023-02-13

## 2022-08-02 RX ORDER — LOSARTAN POTASSIUM 100 MG/1
100 TABLET ORAL DAILY
Qty: 90 TABLET | Refills: 1 | Status: SHIPPED | OUTPATIENT
Start: 2022-08-02 | End: 2023-02-13

## 2022-08-02 ASSESSMENT — ANXIETY QUESTIONNAIRES
IF YOU CHECKED OFF ANY PROBLEMS ON THIS QUESTIONNAIRE, HOW DIFFICULT HAVE THESE PROBLEMS MADE IT FOR YOU TO DO YOUR WORK, TAKE CARE OF THINGS AT HOME, OR GET ALONG WITH OTHER PEOPLE: SOMEWHAT DIFFICULT
GAD7 TOTAL SCORE: 5
7. FEELING AFRAID AS IF SOMETHING AWFUL MIGHT HAPPEN: SEVERAL DAYS
1. FEELING NERVOUS, ANXIOUS, OR ON EDGE: SEVERAL DAYS
5. BEING SO RESTLESS THAT IT IS HARD TO SIT STILL: NOT AT ALL
2. NOT BEING ABLE TO STOP OR CONTROL WORRYING: SEVERAL DAYS
6. BECOMING EASILY ANNOYED OR IRRITABLE: NOT AT ALL
GAD7 TOTAL SCORE: 5
3. WORRYING TOO MUCH ABOUT DIFFERENT THINGS: SEVERAL DAYS

## 2022-08-02 ASSESSMENT — PATIENT HEALTH QUESTIONNAIRE - PHQ9
5. POOR APPETITE OR OVEREATING: SEVERAL DAYS
SUM OF ALL RESPONSES TO PHQ QUESTIONS 1-9: 3

## 2022-08-02 NOTE — NURSING NOTE
Chief Complaint   Patient presents with     Recheck Medication     Fasting today     Pre-visit Screening:  Immunizations:  up to date  Colonoscopy:  is up to date  Mammogram: is up to date  Asthma Action Test/Plan:  NA  PHQ9:  Done today  GAD7:  Done today  Questioned patient about current smoking habits Pt. Smokes.  Ok to leave detailed message on voice mail for today's visit only Yes, phone # [phone]

## 2022-08-02 NOTE — PROGRESS NOTES
Assessment & Plan     Elevated fasting glucose  A1C up - recheck 6 months  Diet modification advised  - HEMOGLOBIN A1C ([facility])  - Glucose Fasting ([facility])  - VENOUS COLLECTION    Subclinical hypothyroidism    - T4 FREE ([facility])  - TSH ([facility])  - VENOUS COLLECTION  - Thyroid Peroxidase and Thyroglob Atb ([facility])    Vitamin D deficiency    - VITAMIN D DEFICIENCY SCREENING ([facility])  - VENOUS COLLECTION  - VITAMIN D DEFICIENCY SCREENING ([facility])    Chronic kidney disease, stage 3a (H)    - VENOUS COLLECTION    Personal history of tobacco use, presenting hazards to health    - VENOUS COLLECTION    Obesity (BMI 30.0-34.9)    - VENOUS COLLECTION    Osteopenia, repeat DEXA 2022    - VITAMIN D DEFICIENCY SCREENING ([facility])  - PTH, Intact and Calcium ([facility])  - Magnesium ([facility])  - PHOSPHORUS ([facility])  - DX Hip/Pelvis/Spine  - Radiology Referral  - VENOUS COLLECTION  - Thyroid Peroxidase and Thyroglob Atb ([facility])    Hiatal hernia      Pulmonary nodules      Essential hypertension, benign    - amLODIPine (NORVASC) 5 MG tablet  Dispense: 90 tablet; Refill: 1  - hydrochlorothiazide (HYDRODIURIL) 25 MG tablet  Dispense: 90 tablet; Refill: 1  - losartan (COZAAR) 100 MG tablet  Dispense: 90 tablet; Refill: 1  - VENOUS COLLECTION    Adjustment disorder with mixed anxiety and depressed mood    - buPROPion (WELLBUTRIN XL) 150 MG 24 hr tablet  Dispense: 90 tablet; Refill: 1    Mixed hyperlipidemia    - pravastatin (PRAVACHOL) 20 MG tablet  Dispense: 90 tablet; Refill: 1  - VENOUS COLLECTION        FUTURE APPOINTMENTS:       - Follow-up visit in 6 months fasting med check      TEENA Gastelum  [facility]    Subjective     Nursing Notes:   Rosa Huddleston CMA  8/2/2022  8:49 AM  Signed  Chief Complaint   Patient presents with     Recheck Medication     Fasting today     Pre-visit Screening:  Immunizations:  up to date  Colonoscopy:  is up to date  Mammogram: is up to date  Asthma Action Test/Plan:  NA  PHQ9:  Done  today  GAD7:  Done today  Questioned patient about current smoking habits Pt. Smokes.  Ok to leave detailed message on voice mail for today's visit only Yes, phone # [phone]                Keyonna Garcia is a 72 year old female who presents to clinic today for the following health issues     HPI     Fasting glucose recheck    Osteopenia labs today  DEXA due    Hiatal hernia on CT  - GERD - taking MV     Smoking 3-4 cigs daily  Not ready to quit quite yet - working on it.  Repeat lung CT Jan 2023      Current Medications  Current Outpatient Medications   Medication Sig Dispense Refill     amLODIPine (NORVASC) 5 MG tablet Take 1 tablet (5 mg) by mouth daily 90 tablet 1     buPROPion (WELLBUTRIN XL) 150 MG 24 hr tablet Take 1 tablet (150 mg) by mouth every morning 90 tablet 1     cetirizine (ZYRTEC) 10 MG tablet Take 5 mg by mouth daily       fluticasone (FLONASE) 50 MCG/ACT nasal spray Spray 2 sprays into both nostrils daily 9.9 mL 0     hydrochlorothiazide (HYDRODIURIL) 25 MG tablet Take 1 tablet (25 mg) by mouth daily 90 tablet 1     ketoconazole (NIZORAL) 2 % external cream Apply to AA BID PRN 60 g 11     losartan (COZAAR) 100 MG tablet Take 1 tablet (100 mg) by mouth daily 90 tablet 1     metroNIDAZOLE (METROCREAM) 0.75 % external cream Apply topically 2 times daily To the face 45 g 0     montelukast (SINGULAIR) 10 MG tablet Take 1 tablet (10 mg) by mouth At Bedtime 90 tablet 3     omeprazole (PRILOSEC) 20 MG DR capsule TAKE 1 CAPSULE BY MOUTH ONE TIME DAILY 90 capsule 3     pravastatin (PRAVACHOL) 20 MG tablet Take 1 tablet (20 mg) by mouth At Bedtime 90 tablet 1     Vitamin D3 (CHOLECALCIFEROL) 125 MCG (5000 UT) tablet Take by mouth daily           Constitutional, HEENT, Cardiovascular, Pulmonary, GI and  systems are negative, except as otherwise noted.          Objective    /74 (BP Location: Right arm, Patient Position: Sitting, Cuff Size: Adult Regular)   Pulse 70   Temp 98.3  F (36.8  C)  "(Temporal)   Ht 1.588 m (5' 2.5\")   Wt 83.2 kg (183 lb 6.4 oz)   LMP  (LMP Unknown)   SpO2 97%   BMI 33.01 kg/m    Body mass index is 33.01 kg/m .  Physical Exam   GENERAL: healthy, alert and no distress  RESP: lungs clear to auscultation - no rales, rhonchi or wheezes  CV: regular rate and rhythm, normal S1 S2, no S3 or S4, no murmur, click or rub, no peripheral edema and peripheral pulses strong  MS: no gross musculoskeletal defects noted        Mental Status Exam:   Appearance: calm  Grooming: adequately groomed  Demeanor: engaged, cooperative  Affect: normal  Speech: Normal.  Gait:Normal.  Movements: Normal  Form of thought: Logical, Linear and Goal directed  Thought content:  Normal  Insight:Good   Judgment: Good   Cognition: Good       "

## 2022-08-03 LAB
CALCIUM SERPL-MCNC: 10 MG/DL (ref 8.6–10.4)
MAGNESIUM SERPL-MCNC: 1.5 MG/DL (ref 1.5–2.5)
PHOSPHATE SERPL-MCNC: 3.6 MG/DL (ref 2.1–4.3)
PTH, INTACT: 45 PG/ML (ref 16–77)
T4, FREE, NON-DIALYSIS - QUEST: 1.1 NG/DL (ref 0.8–1.8)
THYROGLOBULIN ANTIBODY: <1 IU/ML (ref 0–40)
THYROID PEROXIDASE AB: <1 IU/ML
TSH SERPL-ACNC: 5.49 MIU/L (ref 0.4–4.5)
VITAMIN D, 25-OH, TOTAL - QUEST: 52 NG/ML (ref 30–100)

## 2022-09-26 NOTE — PROGRESS NOTES
Assessment & Plan     Situational mixed anxiety and depressive disorder      Care provider for parents    Pt not interested in changing medication  FLMA paperwork filled out  See me 1 month      TEENA Gastelum  [facility]    Subjective     Nursing Notes:   Rosa Huddleston CMA  9/27/2022 11:18 AM  Signed  Chief Complaint   Patient presents with     FMLA     FMLA forms to help with mother and father moving into hospice care      Pre-visit Screening:  Immunizations:  up to date  Colonoscopy:  is up to date  Mammogram: is up to date  Asthma Action Test/Plan:  NA  PHQ9:  NA  GAD7:  NA  Questioned patient about current smoking habits Pt. Smokes.  Ok to leave detailed message on voice mail for today's visit only Yes, phone # [phone]              Keyonna Garcia is a 72 year old female who presents to clinic today for the following health issues:     HPI     Mother is 88 years old, Had stroke 2 months ago, still having issues with swallowing  Underlying dementia - sepsis and 4 hospitalizations   In transition care now, moving to long term care tomorrow - in [place]  Has not entered hospice but is eligible.   Patients brother is power of       Father increased falls risk  Fell 1 year ago, broke neck  CHF now, on O2  Has been living at home independently  Dad will be moving in to assisted living as well different room.    Patient is requesting leave due to end stage of life of her parents.    Parents have townhome - renting out this in 1-2 months and need to clean out the house.     Keyonna - Nurse care coordinator   Full Times.                 Objective    /76 (BP Location: Left arm, Patient Position: Sitting, Cuff Size: Adult Regular)   Pulse 68   Temp 98.2  F (36.8  C) (Temporal)   LMP  (LMP Unknown)   SpO2 98%   There is no height or weight on file to calculate BMI.  Physical Exam   GENERAL: healthy, alert and no distress    Mental Status Exam:   Appearance:  anxious  Grooming: adequately groomed  Demeanor: engaged, cooperative  Affect: normal  Speech: Normal.  Gait:Normal.  Movements: Normal  Form of thought: Logical, Linear and Goal directed  Thought content:  Normal  Insight:Good   Judgment: Good   Cognition: Good

## 2022-09-27 ENCOUNTER — OFFICE VISIT (OUTPATIENT)
Dept: FAMILY MEDICINE | Facility: CLINIC | Age: 72
End: 2022-09-27

## 2022-09-27 VITALS
OXYGEN SATURATION: 98 % | SYSTOLIC BLOOD PRESSURE: 126 MMHG | TEMPERATURE: 98.2 F | DIASTOLIC BLOOD PRESSURE: 76 MMHG | HEART RATE: 68 BPM

## 2022-09-27 DIAGNOSIS — F43.23 SITUATIONAL MIXED ANXIETY AND DEPRESSIVE DISORDER: Primary | ICD-10-CM

## 2022-09-27 DIAGNOSIS — Z63.6: ICD-10-CM

## 2022-09-27 PROCEDURE — 99213 OFFICE O/P EST LOW 20 MIN: CPT | Mod: 25 | Performed by: PHYSICIAN ASSISTANT

## 2022-09-27 PROCEDURE — 90662 IIV NO PRSV INCREASED AG IM: CPT | Performed by: PHYSICIAN ASSISTANT

## 2022-09-27 PROCEDURE — 90471 IMMUNIZATION ADMIN: CPT | Performed by: PHYSICIAN ASSISTANT

## 2022-09-27 SDOH — SOCIAL STABILITY - SOCIAL INSECURITY: DEPENDENT RELATIVE NEEDING CARE AT HOME: Z63.6

## 2022-09-27 ASSESSMENT — ANXIETY QUESTIONNAIRES
6. BECOMING EASILY ANNOYED OR IRRITABLE: NOT AT ALL
GAD7 TOTAL SCORE: 5
3. WORRYING TOO MUCH ABOUT DIFFERENT THINGS: SEVERAL DAYS
5. BEING SO RESTLESS THAT IT IS HARD TO SIT STILL: SEVERAL DAYS
1. FEELING NERVOUS, ANXIOUS, OR ON EDGE: SEVERAL DAYS
IF YOU CHECKED OFF ANY PROBLEMS ON THIS QUESTIONNAIRE, HOW DIFFICULT HAVE THESE PROBLEMS MADE IT FOR YOU TO DO YOUR WORK, TAKE CARE OF THINGS AT HOME, OR GET ALONG WITH OTHER PEOPLE: SOMEWHAT DIFFICULT
2. NOT BEING ABLE TO STOP OR CONTROL WORRYING: SEVERAL DAYS
7. FEELING AFRAID AS IF SOMETHING AWFUL MIGHT HAPPEN: NOT AT ALL
GAD7 TOTAL SCORE: 5

## 2022-09-27 ASSESSMENT — PATIENT HEALTH QUESTIONNAIRE - PHQ9
SUM OF ALL RESPONSES TO PHQ QUESTIONS 1-9: 9
5. POOR APPETITE OR OVEREATING: SEVERAL DAYS

## 2022-09-27 NOTE — NURSING NOTE
Chief Complaint   Patient presents with     FMLA     FMLA forms to help with mother and father moving into hospice care      Pre-visit Screening:  Immunizations:  up to date  Colonoscopy:  is up to date  Mammogram: is up to date  Asthma Action Test/Plan:  NA  PHQ9:  NA  GAD7:  NA  Questioned patient about current smoking habits Pt. Smokes.  Ok to leave detailed message on voice mail for today's visit only Yes, phone # [phone]

## 2022-10-24 ENCOUNTER — OFFICE VISIT (OUTPATIENT)
Dept: FAMILY MEDICINE | Facility: CLINIC | Age: 72
End: 2022-10-24

## 2022-10-24 VITALS
DIASTOLIC BLOOD PRESSURE: 78 MMHG | BODY MASS INDEX: 34.04 KG/M2 | HEIGHT: 62 IN | WEIGHT: 185 LBS | HEART RATE: 72 BPM | TEMPERATURE: 97.8 F | OXYGEN SATURATION: 98 % | SYSTOLIC BLOOD PRESSURE: 122 MMHG | RESPIRATION RATE: 14 BRPM

## 2022-10-24 DIAGNOSIS — F43.23 ADJUSTMENT DISORDER WITH MIXED ANXIETY AND DEPRESSED MOOD: Primary | ICD-10-CM

## 2022-10-24 PROCEDURE — 99212 OFFICE O/P EST SF 10 MIN: CPT | Performed by: PHYSICIAN ASSISTANT

## 2022-10-24 NOTE — NURSING NOTE
Chief Complaint   Patient presents with     RECHECK     Pt is here on a follow up on her  FMLA- forms    Pre-visit Screening:  Immunizations:  up to date  Colonoscopy: Done 2021  Mammogram: 2022  Asthma Action Test/Plan: N/A  PHQ9:  N/A  GAD7:  N.A  Questioned patient about current smoking habits: Pt smoke about 10 Cigaret a day  Ok to leave detailed message on voice mail for today's visit only YES, phone #   [phone]

## 2022-10-24 NOTE — PROGRESS NOTES
"Assessment & Plan     Adjustment disorder with mixed anxiety and depressed mood      Back at work.  May request another leave if mother end of life.  Advised I am here until 12/22 and will fill out paperwork until that time. After then will need to est care with new [facility] provider.        TEENA Gastelum  [facility]    Subjective     Nursing Notes:   Vera Berrios CMA  10/24/2022  4:53 PM  Signed  Chief Complaint   Patient presents with     RECHECK     Pt is here on a follow up on her  FMLA- forms    Pre-visit Screening:  Immunizations:  up to date  Colonoscopy: Done 2021  Mammogram: 2022  Asthma Action Test/Plan: N/A  PHQ9:  N/A  GAD7:  N.A  Questioned patient about current smoking habits: Pt smoke about 10 Cigaret a day  Ok to leave detailed message on voice mail for today's visit only YES, phone #   [phone]          Keyonna Garcia is a 72 year old female who presents to clinic today for the following health issues:     HPI     Has moved both parents to nursing home, had to moved them out of the [facility].     Back to work today.   Mother is being evaluated for hospice            Objective    /78 (BP Location: Right arm, Patient Position: Sitting, Cuff Size: Adult Large)   Pulse 72   Temp 97.8  F (36.6  C) (Tympanic)   Resp 14   Ht 1.575 m (5' 2\")   Wt 83.9 kg (185 lb)   LMP  (LMP Unknown)   SpO2 98%   BMI 33.84 kg/m    Body mass index is 33.84 kg/m .  Physical Exam   GENERAL: healthy, alert and no distress    Mental Status Exam:   Appearance: calm  Grooming: adequately groomed  Demeanor: engaged, cooperative  Affect: normal  Speech: Normal.  Gait:Normal.  Movements: Normal  Form of thought: Logical, Linear and Goal directed  Thought content:  Normal  Insight:Good   Judgment: Good   Cognition: Good       "

## 2022-12-30 DIAGNOSIS — K21.9 GASTROESOPHAGEAL REFLUX DISEASE WITHOUT ESOPHAGITIS: ICD-10-CM

## 2022-12-30 DIAGNOSIS — H10.45 CHRONIC ALLERGIC CONJUNCTIVITIS: ICD-10-CM

## 2023-01-03 RX ORDER — MONTELUKAST SODIUM 10 MG/1
TABLET ORAL
Qty: 30 TABLET | Refills: 0 | Status: SHIPPED | OUTPATIENT
Start: 2023-01-03 | End: 2023-02-21

## 2023-01-03 NOTE — TELEPHONE ENCOUNTER
Approved.   John Rico PA-C  [facility]        Pt last saw [facility] on 08/02/22 advised to return in 6 months. Pt due for OV next month.    Keyonna Garcia is requesting a refill of:    Pending Prescriptions:                       Disp   Refills    montelukast (SINGULAIR) 10 MG tablet [Pha*30 tab*0            Sig: TAKE ONE TABLET BY MOUTH AT BEDTIME    omeprazole (PRILOSEC) 20 MG DR capsule [P*30 cap*0            Sig: TAKE ONE CAPSULE BY MOUTH ONE TIME DAILY

## 2023-02-13 DIAGNOSIS — E78.2 MIXED HYPERLIPIDEMIA: ICD-10-CM

## 2023-02-13 DIAGNOSIS — F43.23 ADJUSTMENT DISORDER WITH MIXED ANXIETY AND DEPRESSED MOOD: ICD-10-CM

## 2023-02-13 DIAGNOSIS — I10 ESSENTIAL HYPERTENSION, BENIGN: ICD-10-CM

## 2023-02-13 RX ORDER — BUPROPION HYDROCHLORIDE 150 MG/1
150 TABLET ORAL EVERY MORNING
Qty: 14 TABLET | Refills: 0 | Status: SHIPPED | OUTPATIENT
Start: 2023-02-13 | End: 2023-02-21

## 2023-02-13 RX ORDER — AMLODIPINE BESYLATE 5 MG/1
5 TABLET ORAL DAILY
Qty: 14 TABLET | Refills: 0 | Status: SHIPPED | OUTPATIENT
Start: 2023-02-13 | End: 2023-02-21

## 2023-02-13 RX ORDER — HYDROCHLOROTHIAZIDE 25 MG/1
25 TABLET ORAL DAILY
Qty: 14 TABLET | Refills: 0 | Status: SHIPPED | OUTPATIENT
Start: 2023-02-13 | End: 2023-02-21

## 2023-02-13 RX ORDER — PRAVASTATIN SODIUM 20 MG
20 TABLET ORAL AT BEDTIME
Qty: 14 TABLET | Refills: 0 | Status: SHIPPED | OUTPATIENT
Start: 2023-02-13 | End: 2023-02-21

## 2023-02-13 RX ORDER — LOSARTAN POTASSIUM 100 MG/1
100 TABLET ORAL DAILY
Qty: 14 TABLET | Refills: 0 | Status: SHIPPED | OUTPATIENT
Start: 2023-02-13 | End: 2023-02-21

## 2023-02-13 NOTE — TELEPHONE ENCOUNTER
Approved  John Rico PA-C  [facility]          Pt was a Barbra pt and scheduled an appointment for 2/21. She is requesting an extension of medication.  Routing to elie Lopez.      Pending Prescriptions:                       Disp   Refills    amLODIPine (NORVASC) 5 MG tablet          14 tab*0            Sig: Take 1 tablet (5 mg) by mouth daily    buPROPion (WELLBUTRIN XL) 150 MG 24 hr ta*14 tab*0            Sig: Take 1 tablet (150 mg) by mouth every morning    hydrochlorothiazide (HYDRODIURIL) 25 MG t*14 tab*0            Sig: Take 1 tablet (25 mg) by mouth daily    losartan (COZAAR) 100 MG tablet           14 tab*0            Sig: Take 1 tablet (100 mg) by mouth daily    pravastatin (PRAVACHOL) 20 MG tablet      14 tab*0            Sig: Take 1 tablet (20 mg) by mouth At Bedtime

## 2023-02-14 DIAGNOSIS — F43.23 ADJUSTMENT DISORDER WITH MIXED ANXIETY AND DEPRESSED MOOD: ICD-10-CM

## 2023-02-14 DIAGNOSIS — E78.2 MIXED HYPERLIPIDEMIA: ICD-10-CM

## 2023-02-14 DIAGNOSIS — I10 ESSENTIAL HYPERTENSION, BENIGN: ICD-10-CM

## 2023-02-14 RX ORDER — HYDROCHLOROTHIAZIDE 25 MG/1
TABLET ORAL
Qty: 90 TABLET | Refills: 0 | COMMUNITY
Start: 2023-02-14

## 2023-02-14 RX ORDER — LOSARTAN POTASSIUM 100 MG/1
TABLET ORAL
Qty: 90 TABLET | Refills: 0 | COMMUNITY
Start: 2023-02-14

## 2023-02-14 RX ORDER — PRAVASTATIN SODIUM 20 MG
TABLET ORAL
Qty: 90 TABLET | Refills: 0 | COMMUNITY
Start: 2023-02-14

## 2023-02-14 RX ORDER — BUPROPION HYDROCHLORIDE 150 MG/1
TABLET ORAL
Qty: 90 TABLET | Refills: 0 | COMMUNITY
Start: 2023-02-14

## 2023-02-14 RX ORDER — AMLODIPINE BESYLATE 5 MG/1
TABLET ORAL
Qty: 90 TABLET | Refills: 0 | COMMUNITY
Start: 2023-02-14

## 2023-02-14 NOTE — TELEPHONE ENCOUNTER
Keyonna Willon is requesting a refill of:    Refused Prescriptions:                       Disp   Refills    pravastatin (PRAVACHOL) 20 MG tablet [Phar*90 tab*0        Sig: TAKE ONE TABLET BY MOUTH AT BEDTIME  Refused By: NATHANAEL GIRALDO  Reason for Refusal: Patient needs appointment    amLODIPine (NORVASC) 5 MG tablet [Pharmacy*90 tab*0        Sig: TAKE ONE TABLET BY MOUTH ONE TIME DAILY  Refused By: NATHANAEL GIRALDO  Reason for Refusal: Patient needs appointment    buPROPion (WELLBUTRIN XL) 150 MG 24 hr tab*90 tab*0        Sig: TAKE ONE TABLET BY MOUTH IN THE MORNING  Refused By: NATHANAEL GIRALDO  Reason for Refusal: Patient needs appointment    hydrochlorothiazide (HYDRODIURIL) 25 MG ta*90 tab*0        Sig: TAKE ONE TABLET BY MOUTH ONE TIME DAILY  Refused By: NATHANAEL GIRALDO  Reason for Refusal: Patient needs appointment    losartan (COZAAR) 100 MG tablet [Pharmacy *90 tab*0        Sig: TAKE ONE TABLET BY MOUTH ONE TIME DAILY  Refused By: NATHANAEL GIRALDO  Reason for Refusal: Patient needs appointment    Pt due for OV, short extension was sent in yesterday

## 2023-02-21 ENCOUNTER — OFFICE VISIT (OUTPATIENT)
Dept: FAMILY MEDICINE | Facility: CLINIC | Age: 73
End: 2023-02-21

## 2023-02-21 VITALS
BODY MASS INDEX: 33.49 KG/M2 | RESPIRATION RATE: 20 BRPM | DIASTOLIC BLOOD PRESSURE: 80 MMHG | HEIGHT: 62 IN | SYSTOLIC BLOOD PRESSURE: 138 MMHG | TEMPERATURE: 98 F | WEIGHT: 182 LBS | HEART RATE: 76 BPM | OXYGEN SATURATION: 95 %

## 2023-02-21 DIAGNOSIS — F43.23 ADJUSTMENT DISORDER WITH MIXED ANXIETY AND DEPRESSED MOOD: ICD-10-CM

## 2023-02-21 DIAGNOSIS — Z12.31 ENCOUNTER FOR SCREENING MAMMOGRAM FOR BREAST CANCER: ICD-10-CM

## 2023-02-21 DIAGNOSIS — E78.2 MIXED HYPERLIPIDEMIA: ICD-10-CM

## 2023-02-21 DIAGNOSIS — I10 ESSENTIAL HYPERTENSION, BENIGN: Primary | ICD-10-CM

## 2023-02-21 DIAGNOSIS — M85.80 OSTEOPENIA, UNSPECIFIED LOCATION: ICD-10-CM

## 2023-02-21 DIAGNOSIS — Z71.6 ENCOUNTER FOR SMOKING CESSATION COUNSELING: ICD-10-CM

## 2023-02-21 DIAGNOSIS — E66.811 OBESITY (BMI 30.0-34.9): ICD-10-CM

## 2023-02-21 DIAGNOSIS — K21.9 GASTROESOPHAGEAL REFLUX DISEASE WITHOUT ESOPHAGITIS: ICD-10-CM

## 2023-02-21 DIAGNOSIS — H10.45 CHRONIC ALLERGIC CONJUNCTIVITIS: ICD-10-CM

## 2023-02-21 DIAGNOSIS — F17.200 SMOKER: ICD-10-CM

## 2023-02-21 DIAGNOSIS — R73.03 PREDIABETES: ICD-10-CM

## 2023-02-21 DIAGNOSIS — N18.31 CHRONIC KIDNEY DISEASE, STAGE 3A (H): ICD-10-CM

## 2023-02-21 LAB
BUN SERPL-MCNC: 28 MG/DL (ref 7–25)
BUN/CREATININE RATIO: 21.7 (ref 6–22)
CALCIUM SERPL-MCNC: 9.9 MG/DL (ref 8.6–10.3)
CHLORIDE SERPLBLD-SCNC: 101.2 MMOL/L (ref 98–110)
CHOLEST SERPL-MCNC: 225 MG/DL (ref 0–199)
CHOLEST/HDLC SERPL: 3 {RATIO} (ref 0–5)
CO2 SERPL-SCNC: 27 MMOL/L (ref 20–32)
CREAT SERPL-MCNC: 1.29 MG/DL (ref 0.6–1.3)
GFR SERPL CREATININE-BSD FRML MDRD: 45 ML/MIN/1.73M2
GLUCOSE SERPL-MCNC: 107 MG/DL (ref 60–99)
HDLC SERPL-MCNC: 70 MG/DL (ref 40–150)
LDLC SERPL CALC-MCNC: 124 MG/DL (ref 0–130)
POTASSIUM SERPL-SCNC: 4.31 MMOL/L (ref 3.5–5.3)
SODIUM SERPL-SCNC: 141.2 MMOL/L (ref 135–146)
TRIGL SERPL-MCNC: 154 MG/DL (ref 0–149)

## 2023-02-21 PROCEDURE — 80048 BASIC METABOLIC PNL TOTAL CA: CPT | Performed by: STUDENT IN AN ORGANIZED HEALTH CARE EDUCATION/TRAINING PROGRAM

## 2023-02-21 PROCEDURE — 80061 LIPID PANEL: CPT | Performed by: STUDENT IN AN ORGANIZED HEALTH CARE EDUCATION/TRAINING PROGRAM

## 2023-02-21 PROCEDURE — 99214 OFFICE O/P EST MOD 30 MIN: CPT | Mod: 25 | Performed by: STUDENT IN AN ORGANIZED HEALTH CARE EDUCATION/TRAINING PROGRAM

## 2023-02-21 PROCEDURE — G0296 VISIT TO DETERM LDCT ELIG: HCPCS | Performed by: STUDENT IN AN ORGANIZED HEALTH CARE EDUCATION/TRAINING PROGRAM

## 2023-02-21 PROCEDURE — 36415 COLL VENOUS BLD VENIPUNCTURE: CPT | Performed by: STUDENT IN AN ORGANIZED HEALTH CARE EDUCATION/TRAINING PROGRAM

## 2023-02-21 RX ORDER — LOSARTAN POTASSIUM 100 MG/1
100 TABLET ORAL DAILY
Qty: 90 TABLET | Refills: 1 | Status: SHIPPED | OUTPATIENT
Start: 2023-02-21 | End: 2023-08-09

## 2023-02-21 RX ORDER — PRAVASTATIN SODIUM 20 MG
20 TABLET ORAL AT BEDTIME
Qty: 90 TABLET | Refills: 3 | Status: SHIPPED | OUTPATIENT
Start: 2023-02-21 | End: 2024-02-26

## 2023-02-21 RX ORDER — BUPROPION HYDROCHLORIDE 150 MG/1
150 TABLET ORAL EVERY MORNING
Qty: 90 TABLET | Refills: 1 | Status: SHIPPED | OUTPATIENT
Start: 2023-02-21 | End: 2023-08-23

## 2023-02-21 RX ORDER — MONTELUKAST SODIUM 10 MG/1
1 TABLET ORAL AT BEDTIME
Qty: 90 TABLET | Refills: 3 | Status: SHIPPED | OUTPATIENT
Start: 2023-02-21 | End: 2024-02-26

## 2023-02-21 RX ORDER — AMLODIPINE BESYLATE 5 MG/1
5 TABLET ORAL DAILY
Qty: 90 TABLET | Refills: 1 | Status: SHIPPED | OUTPATIENT
Start: 2023-02-21 | End: 2023-08-09

## 2023-02-21 RX ORDER — HYDROCHLOROTHIAZIDE 25 MG/1
25 TABLET ORAL DAILY
Qty: 90 TABLET | Refills: 1 | Status: SHIPPED | OUTPATIENT
Start: 2023-02-21 | End: 2023-08-09

## 2023-02-21 ASSESSMENT — PATIENT HEALTH QUESTIONNAIRE - PHQ9
SUM OF ALL RESPONSES TO PHQ QUESTIONS 1-9: 4
5. POOR APPETITE OR OVEREATING: NOT AT ALL

## 2023-02-21 ASSESSMENT — ANXIETY QUESTIONNAIRES
GAD7 TOTAL SCORE: 3
5. BEING SO RESTLESS THAT IT IS HARD TO SIT STILL: NOT AT ALL
IF YOU CHECKED OFF ANY PROBLEMS ON THIS QUESTIONNAIRE, HOW DIFFICULT HAVE THESE PROBLEMS MADE IT FOR YOU TO DO YOUR WORK, TAKE CARE OF THINGS AT HOME, OR GET ALONG WITH OTHER PEOPLE: NOT DIFFICULT AT ALL
6. BECOMING EASILY ANNOYED OR IRRITABLE: NOT AT ALL
GAD7 TOTAL SCORE: 3
2. NOT BEING ABLE TO STOP OR CONTROL WORRYING: NOT AT ALL
3. WORRYING TOO MUCH ABOUT DIFFERENT THINGS: SEVERAL DAYS
7. FEELING AFRAID AS IF SOMETHING AWFUL MIGHT HAPPEN: SEVERAL DAYS
1. FEELING NERVOUS, ANXIOUS, OR ON EDGE: SEVERAL DAYS

## 2023-02-21 NOTE — PROGRESS NOTES
Assessment & Plan       ICD-10-CM    1. Essential hypertension, benign  I10 amLODIPine (NORVASC) 5 MG tablet     hydrochlorothiazide (HYDRODIURIL) 25 MG tablet     losartan (COZAAR) 100 MG tablet     Basic Metabolic Panel ([facility])      2. Adjustment disorder with mixed anxiety and depressed mood  F43.23 buPROPion (WELLBUTRIN XL) 150 MG 24 hr tablet      3. Chronic allergic conjunctivitis  H10.45 montelukast (SINGULAIR) 10 MG tablet      4. Gastroesophageal reflux disease without esophagitis  K21.9 omeprazole (PRILOSEC) 20 MG DR capsule      5. Mixed hyperlipidemia  E78.2 pravastatin (PRAVACHOL) 20 MG tablet     Lipid Panel ([facility])      6. Osteopenia, repeat DEXA 2022  M85.80       7. Prediabetes  R73.03 HEMOGLOBIN A1C ([facility])     CANCELED: HEMOGLOBIN A1C ([facility])      8. Encounter for smoking cessation counseling  Z71.6       9. Smoker  F17.200 Prof fee: Shared Decision Making for Lung Cancer Screening     CT Chest Lung Cancer Scrn Low Dose wo     Radiology Referral      10. Encounter for screening mammogram for breast cancer  Z12.31 Mammo Screening digital (bilat)      11. Chronic kidney disease, stage 3a (H)  N18.31       12. Obesity (BMI 30.0-34.9)  E66.9            Patient Instructions   Blood work today    No change to medications     Schedule DEXA and lung CT  [facility]  [address]  [address] [place] [address]  [phone] -  Main Scheduling    Follow-up 6 months, sooner if needed        Pt new to me today, chart reviewed and updated. Plan as above.     >30 minutes spent on the date of the encounter doing chart review, history and exam, documentation and further activities per the note    Stephen Atkinson MD, [facility]       Subjective     Keyonna Garcia is a 72 year old female who presents to clinic today for the following health issues:    HPI   Chief Complaint   Patient presents with     Recheck Medication     Fasting medication check and review,  "needs refills     Medicare Visit     Annual medicare wellness visit       Hyperlipidemia Follow-Up      Are you regularly taking any medication or supplement to lower your cholesterol?   Yes- statin    Are you having muscle aches or other side effects that you think could be caused by your cholesterol lowering medication?  No    Hypertension Follow-up    Are you following a low salt diet? Yes    Are your blood pressures ever more than 140 on the top number (systolic) OR more   than 90 on the bottom number (diastolic), for example 140/90? No    Mood Followup    How are you doing with your depression since your last visit? Stable, situational    Are you having other symptoms that might be associated with depression? No    Do you have any concerns with your use of alcohol or other drugs? No    Social History     Tobacco Use     Smoking status: Some Days     Packs/day: 1.00     Years: 20.00     Pack years: 20.00     Types: Cigarettes     Smokeless tobacco: Never   Substance Use Topics     Alcohol use: Yes     Alcohol/week: 0.0 standard drinks     Comment: 1 drink monthly     Drug use: No     PHQ 8/2/2022 9/27/2022 2/21/2023   PHQ-9 Total Score 3 9 4   Q9: Thoughts of better off dead/self-harm past 2 weeks Not at all Not at all Not at all     HOWARD-7 SCORE 8/2/2022 9/27/2022 2/21/2023   Total Score 5 5 3     Has cut down on smoking, son quit recently        Objective    /80 (BP Location: Right arm, Patient Position: Sitting, Cuff Size: Adult Large)   Pulse 76   Temp 98  F (36.7  C) (Temporal)   Resp 20   Ht 1.575 m (5' 2\")   Wt 82.6 kg (182 lb)   LMP  (LMP Unknown)   SpO2 95%   BMI 33.29 kg/m    Body mass index is 33.29 kg/m .  Alert, NAD  NC/AT  Sclerae anicteric  RRR  Resp nonlabored  Skin warm and dry  No focal neuro deficits. Speech intact.   Appropriate affect  Normal gait.    Labs reviewed.       Lung Cancer Screening Shared Decision Making Visit     Keyonna Garcia is eligible for lung cancer screening " on the basis of the information provided in my signed lung cancer screening order.     I have discussed with patient the risks and benefits of screening for lung cancer with low-dose CT.     The risks include:   radiation exposure: one low dose chest CT has as much ionizing radiation as  about 15 chest x-rays or 6 months of background radiation living in [place]     false positives: 96% of positive findings/nodules are NOT cancer, but some  might still require additional diagnostic evaluation, including biopsy   over-diagnosis: some slow growing cancers that might never have been clinically  significant will be detected and treated unnecessarily     The benefit of early detection of lung cancer is contingent upon adherence to annual screening or more frequent follow up if indicated.     Furthermore, reaping the benefits of screening requires Keyonna Garcia to be willing and physically able to undergo diagnostic procedures, if indicated. Although no specific guide is available for determining severity of comorbidities, it is reasonable to withhold screening in patients who have greater mortality risk from other diseases.     We did discuss that the only way to prevent lung cancer is to not smoke. Smoking cessation assistance was offered.

## 2023-02-21 NOTE — LETTER
April 6, 2023      Keyonna Garcia  [address]   [place] [address]        Dear ,    CT scan was stable from last year and we should repeat again in one year.   DEXA also stable which is great news. We should repeat again in 2-3 years.  Mammogram was normal  Blood tests all relatively stable as well, no new concerns. We'll recheck some labs in August when you come back.       If you have any questions or concerns, please call the clinic at the number listed above.       Sincerely,      Stephen Atkinson MD, [facility]

## 2023-02-21 NOTE — PATIENT INSTRUCTIONS
Blood work today    No change to medications     Schedule DEXA and lung CT  [facility]  [address]  [address]  [facility] [address]  [phone] -  Main Scheduling    Follow-up 6 months, sooner if needed

## 2023-02-21 NOTE — NURSING NOTE
Chief Complaint   Patient presents with     Recheck Medication     Fasting medication check and review, needs refills     Medicare Visit     Annual medicare wellness visit      Pre-visit Screening:  Immunizations:  up to date  Colonoscopy:  is up to date  Mammogram: is up to date  Asthma Action Test/Plan:  NA  PHQ9:  Given today   GAD7:  Given today   Questioned patient about current smoking habits Pt. currently smokes.  Advised about smoking cessation.  Ok to leave detailed message on voice mail for today's visit only Yes, phone # [phone]

## 2023-02-21 NOTE — PROGRESS NOTES
Keyonna Garcia is a 72 year old female who presents for Medicare Annual Wellness Visit.    Current providers caring for this patient include:  Patient Care Team:  Stephen Atkinson MD as PCP - General (Family Medicine)  Barbra Puga PA (Inactive) as Assigned PCP  Cheyenne Wall PA-C as Physician Assistant (Dermatology)  Cheyenne Wall PA-C as Assigned Surgical Provider    Complete Medical and Social history reviewed with patient, outlined below.    Patient Active Problem List   Diagnosis     Essential hypertension, benign     Mixed hyperlipidemia     Adjustment disorder with mixed anxiety and depressed mood     Health Care Home     Gastroesophageal reflux disease without esophagitis     ACP (advance care planning)     Seborrheic dermatitis     Status post right hip replacement     History of basal cell carcinoma     Osteopenia, repeat DEXA 2022     Obesity (BMI 30.0-34.9)     Elevated fasting glucose     Personal history of tobacco use, presenting hazards to health     Chronic kidney disease, stage 3a (H)     Vitamin D deficiency     Subclinical hypothyroidism     Hiatal hernia     Pulmonary nodules       Past Medical History:   Diagnosis Date     Arthritis 2008    left finger surgery     Basal cell carcinoma      Essential hypertension, benign 7/7/2011     Mixed hyperlipidemia 7/7/2011     Presence of right hip implant 2/7/2017     Tobacco use disorder 7/7/2011       Past Surgical History:   Procedure Laterality Date     ARTHROPLASTY HIP Right 12/27/2016    Procedure: ARTHROPLASTY HIP;  Surgeon: Rolando Baeza MD;  Location: [facility] OR     FINGER SURGERY  2008          ORTHOPEDIC SURGERY  2008    left finger surgery     SALPINGO-OOPHORECTOMY, COMBINED  1973    Right ovary removed benign cyst     ZZC REMOVAL OF OVARY(S)      Description: Oophorectomy;  Recorded: 04/06/2009;       Family History   Problem Relation Age of Onset     Hypertension Mother      Lipids Mother      Osteoarthritis  "Mother      Cerebrovascular Disease Mother 88     Hypertension Father      Lipids Father      Osteoarthritis Father      Heart Failure Father         CHF     C.A.D. No family hx of      Diabetes No family hx of      Breast Cancer No family hx of      Cancer - colorectal No family hx of        Social History     Tobacco Use     Smoking status: Some Days     Packs/day: 1.00     Years: 15.00     Pack years: 15.00     Types: Cigarettes     Smokeless tobacco: Never   Substance Use Topics     Alcohol use: Yes     Alcohol/week: 0.0 standard drinks     Comment: 1 drink monthly       Diet: excess fats-likes ice cream a lot and can work on this-feels she is ok with salt intake-does eat a good amount of fruits and vegetables-does not drink milk and feels that she only gets dairy intake when she eats ice cream   Physical Activity: generally inactive-more active in the summer time and does a lot of swimming then-feels that she is lazy during the winter time   Depression Screen:    Over the past 2 weeks, patient has felt down, depressed, or hopeless:  PHQ9 given today    Over the past 2 weeks, patient has felt little interest or pleasure in doing things: PHQ9 given today-on medication    Functional ability/Safety screen:  Up and go test (able to get up and walk longer than 30 seconds): Passed  Patient needs assistance with: nothing  Patient's home has the following possible safety concerns: none identified  Patient has concerns about her hearing:  No  Cognitive Screen  Patient repeats three objects (ball, flag, tree)      Clock drawing test:   NORMAL  Recalls three objects after 3 minutes (ball,flag,tree):                                                                                               recalls 3 objects (3 points)    Physical Exam:  LMP  (LMP Unknown)    There is no height or weight on file to calculate BMI.      {PROVIDER TO UPDATE PMH, PSH, FamHX, and SocialHX:643199::\"click delete button to remove this line " "now\"}  {PROVIDER TO CLICK \"Refresh all smart links\" icon - double arrows on R of tool bar:572701::\"click delete button to remove this line now\"}  {Optional Additional Exam - brief:146622}    End of Life Planning:   Patient currently has an advanced directive: { :097069}    Education/Counseling:   Based on review of the above information, the following items were addressed:  {IPPE Counselin}    Appropriate preventive services were discussed with this patient, including applicable screening as appropriate for cardiovascular disease, diabetes, osteopenia/osteoporosis, and glaucoma.  As appropriate for age/gender, discussed screening for colorectal cancer, prostate cancer, breast cancer, and cervical cancer.   Checklist reviewing preventive services available has been given to the patient.    {PROVIDER TO CONSIDER printing smart phrase \"piwelcometomedicareqic\" in AFTER VISIT SUMMARY for patient information :492311::\"click delete button to remove this line now\"}    {Please bill a \"NO CHARGE\" so that coders can attach the proper code.  If provider is adding E & M charge beyond above, be sure to add ROS and DIAGNOSIS/PLAN:204951::\"click delete button to remove this line now\"}     "

## 2023-02-22 LAB — HBA1C MFR BLD: 6.1 % OF TOTAL HGB (ref 0–5.7)

## 2023-03-04 NOTE — ADDENDUM NOTE
Addended by: CECY AVELAR on: 12/20/2021 11:21 AM     Modules accepted: Orders    
Addended by: NATHANAEL GIRALDO on: 12/20/2021 10:00 AM     Modules accepted: Orders    
warm/no numbness/no tingling/no discoloration

## 2023-04-04 LAB — MAMMOGRAM: NORMAL

## 2023-04-05 ENCOUNTER — TELEPHONE (OUTPATIENT)
Dept: FAMILY MEDICINE | Facility: CLINIC | Age: 73
End: 2023-04-05

## 2023-04-05 DIAGNOSIS — Z12.31 ENCOUNTER FOR SCREENING MAMMOGRAM FOR BREAST CANCER: ICD-10-CM

## 2023-04-05 NOTE — TELEPHONE ENCOUNTER
Pt had CT/mammo/dexa done, results entered.  Please review and send results to pt or call pt if anything concerning.  Please close encounter when finished.  Thank you  [phone] (home) [phone] (work)

## 2023-06-11 ENCOUNTER — HEALTH MAINTENANCE LETTER (OUTPATIENT)
Age: 73
End: 2023-06-11

## 2023-07-18 ENCOUNTER — OFFICE VISIT (OUTPATIENT)
Dept: URGENT CARE | Facility: URGENT CARE | Age: 73
End: 2023-07-18
Payer: COMMERCIAL

## 2023-07-18 VITALS
DIASTOLIC BLOOD PRESSURE: 79 MMHG | BODY MASS INDEX: 35.04 KG/M2 | TEMPERATURE: 98.4 F | OXYGEN SATURATION: 98 % | SYSTOLIC BLOOD PRESSURE: 131 MMHG | RESPIRATION RATE: 20 BRPM | WEIGHT: 191.6 LBS | HEART RATE: 80 BPM

## 2023-07-18 DIAGNOSIS — J02.0 STREPTOCOCCAL SORE THROAT: Primary | ICD-10-CM

## 2023-07-18 LAB — DEPRECATED S PYO AG THROAT QL EIA: POSITIVE

## 2023-07-18 PROCEDURE — 87880 STREP A ASSAY W/OPTIC: CPT | Performed by: PHYSICIAN ASSISTANT

## 2023-07-18 PROCEDURE — 99213 OFFICE O/P EST LOW 20 MIN: CPT | Performed by: PHYSICIAN ASSISTANT

## 2023-07-18 RX ORDER — PENICILLIN V POTASSIUM 500 MG/1
500 TABLET, FILM COATED ORAL 2 TIMES DAILY
Qty: 20 TABLET | Refills: 0 | Status: SHIPPED | OUTPATIENT
Start: 2023-07-18 | End: 2023-07-28

## 2023-07-18 NOTE — PROGRESS NOTES
URGENT CARE VISIT:    SUBJECTIVE:   Keyonna Garcia is a 73 year old female presenting with a chief complaint of stuffy nose and sore throat.  Onset was 2 day(s) ago.   She denies the following symptoms: fever and cough - productive  Course of illness is worsening.    Treatment measures tried include Tylenol/Ibuprofen with no relief of symptoms.  Predisposing factors include None.    PMH:   Past Medical History:   Diagnosis Date     Arthritis 2008    left finger surgery     Basal cell carcinoma      Essential hypertension, benign 7/7/2011     Mixed hyperlipidemia 7/7/2011     Presence of right hip implant 2/7/2017     Tobacco use disorder 7/7/2011     Allergies: Lisinopril and Simvastatin   Medications:   Current Outpatient Medications   Medication Sig Dispense Refill     amLODIPine (NORVASC) 5 MG tablet Take 1 tablet (5 mg) by mouth daily 90 tablet 1     buPROPion (WELLBUTRIN XL) 150 MG 24 hr tablet Take 1 tablet (150 mg) by mouth every morning 90 tablet 1     cetirizine (ZYRTEC) 10 MG tablet Take 5 mg by mouth daily       fluticasone (FLONASE) 50 MCG/ACT nasal spray Spray 2 sprays into both nostrils daily 9.9 mL 0     hydrochlorothiazide (HYDRODIURIL) 25 MG tablet Take 1 tablet (25 mg) by mouth daily 90 tablet 1     ketoconazole (NIZORAL) 2 % external cream Apply to AA BID PRN 60 g 11     losartan (COZAAR) 100 MG tablet Take 1 tablet (100 mg) by mouth daily 90 tablet 1     metroNIDAZOLE (METROCREAM) 0.75 % external cream Apply topically 2 times daily To the face 45 g 0     montelukast (SINGULAIR) 10 MG tablet Take 1 tablet (10 mg) by mouth At Bedtime 90 tablet 3     omeprazole (PRILOSEC) 20 MG DR capsule TAKE ONE CAPSULE BY MOUTH ONE TIME DAILY 90 capsule 3     penicillin V (VEETID) 500 MG tablet Take 1 tablet (500 mg) by mouth 2 times daily for 10 days 20 tablet 0     pravastatin (PRAVACHOL) 20 MG tablet Take 1 tablet (20 mg) by mouth At Bedtime 90 tablet 3     Vitamin D3 (CHOLECALCIFEROL) 125 MCG (5000 UT)  tablet Take by mouth daily       Social History:   Social History     Tobacco Use     Smoking status: Some Days     Packs/day: 1.00     Years: 20.00     Pack years: 20.00     Types: Cigarettes     Smokeless tobacco: Never   Substance Use Topics     Alcohol use: Yes     Alcohol/week: 0.0 standard drinks of alcohol     Comment: 1 drink monthly       ROS:  Review of systems negative except as stated above.    OBJECTIVE:  /79   Pulse 80   Temp 98.4  F (36.9  C) (Tympanic)   Resp 20   Wt 86.9 kg (191 lb 9.6 oz)   LMP  (LMP Unknown)   SpO2 98%   BMI 35.04 kg/m    GENERAL APPEARANCE: healthy, alert and no distress  EYES: EOMI,  PERRL, conjunctiva clear  HENT: ear canals and TM's normal.  Moderately erythematous oropharynx  NECK: supple, nontender, no lymphadenopathy  RESP: lungs clear to auscultation - no rales, rhonchi or wheezes  CV: regular rates and rhythm, normal S1 S2, no murmur noted  SKIN: no suspicious lesions or rashes    Labs:    Results for orders placed or performed in visit on 07/18/23   Streptococcus A Rapid Screen w/Reflex to PCR - Clinic Collect     Status: Abnormal    Specimen: Throat; Swab   Result Value Ref Range    Group A Strep antigen Positive (A) Negative       ASSESSMENT:    ICD-10-CM    1. Streptococcal sore throat  J02.0 Streptococcus A Rapid Screen w/Reflex to PCR - Clinic Collect     penicillin V (VEETID) 500 MG tablet          PLAN:  Patient Instructions   Patient was educated on the natural course of bacterial throat infection. Take medications as prescribed. Side effects discussed. Conservative measures discussed including warm fluids, salt water gargles, Lozenges (Cepacol), and over-the-counter analgesics (Tylenol or Ibuprofen). To prevent spread avoid sharing utensils or glasses until she has completed 24 hours of antibiotic treatment.  Change toothbrush after 24 hrs of being on antibiotic. See your primary care provider if symptoms worsen or do not improve in 7 days. Seek  emergency care if you develop severe throat pain, or difficulty swallowing.     Patient verbalized understanding and is agreeable to plan. The patient was discharged ambulatory and in stable condition.    Francie Martinez PA-C ....................  7/18/2023   4:44 PM

## 2023-07-18 NOTE — PATIENT INSTRUCTIONS
Patient was educated on the natural course of bacterial throat infection. Take medications as prescribed. Side effects discussed. Conservative measures discussed including warm fluids, salt water gargles, Lozenges (Cepacol), and over-the-counter analgesics (Tylenol or Ibuprofen). To prevent spread avoid sharing utensils or glasses until she has completed 24 hours of antibiotic treatment.  Change toothbrush after 24 hrs of being on antibiotic. See your primary care provider if symptoms worsen or do not improve in 7 days. Seek emergency care if you develop severe throat pain, or difficulty swallowing.

## 2023-08-09 DIAGNOSIS — I10 ESSENTIAL HYPERTENSION, BENIGN: ICD-10-CM

## 2023-08-09 RX ORDER — HYDROCHLOROTHIAZIDE 25 MG/1
25 TABLET ORAL DAILY
Qty: 30 TABLET | Refills: 0 | COMMUNITY
Start: 2023-08-09 | End: 2023-10-13

## 2023-08-09 RX ORDER — LOSARTAN POTASSIUM 100 MG/1
100 TABLET ORAL DAILY
Qty: 30 TABLET | Refills: 0 | COMMUNITY
Start: 2023-08-09 | End: 2023-08-23

## 2023-08-09 RX ORDER — AMLODIPINE BESYLATE 5 MG/1
5 TABLET ORAL DAILY
Qty: 30 TABLET | Refills: 0 | COMMUNITY
Start: 2023-08-09 | End: 2023-08-23

## 2023-08-09 NOTE — TELEPHONE ENCOUNTER
Pt has appt scheduled for 08/23/23. I called in #30 tablets of amlodipine 5mg, losartan 100mg, and hydrochlorothiazide 25mg to [facility].

## 2023-08-23 ENCOUNTER — OFFICE VISIT (OUTPATIENT)
Dept: FAMILY MEDICINE | Facility: CLINIC | Age: 73
End: 2023-08-23

## 2023-08-23 VITALS
OXYGEN SATURATION: 96 % | DIASTOLIC BLOOD PRESSURE: 82 MMHG | TEMPERATURE: 98 F | BODY MASS INDEX: 34.2 KG/M2 | SYSTOLIC BLOOD PRESSURE: 130 MMHG | RESPIRATION RATE: 20 BRPM | HEART RATE: 83 BPM | WEIGHT: 187 LBS

## 2023-08-23 DIAGNOSIS — F43.23 ADJUSTMENT DISORDER WITH MIXED ANXIETY AND DEPRESSED MOOD: Primary | ICD-10-CM

## 2023-08-23 DIAGNOSIS — R73.03 PREDIABETES: ICD-10-CM

## 2023-08-23 DIAGNOSIS — E03.8 SUBCLINICAL HYPOTHYROIDISM: ICD-10-CM

## 2023-08-23 DIAGNOSIS — I10 ESSENTIAL HYPERTENSION, BENIGN: ICD-10-CM

## 2023-08-23 LAB
BUN SERPL-MCNC: 22 MG/DL (ref 7–25)
BUN/CREATININE RATIO: 16.1 (ref 6–32)
CALCIUM SERPL-MCNC: 9.8 MG/DL (ref 8.6–10.3)
CHLORIDE SERPLBLD-SCNC: 103.6 MMOL/L (ref 98–110)
CO2 SERPL-SCNC: 30.3 MMOL/L (ref 20–32)
CREAT SERPL-MCNC: 1.37 MG/DL (ref 0.6–1.3)
GFR SERPL CREATININE-BSD FRML MDRD: 41 ML/MIN/1.73M2
GLUCOSE SERPL-MCNC: 108 MG/DL (ref 60–99)
HBA1C MFR BLD: 5.8 % (ref 4–7)
POTASSIUM SERPL-SCNC: 4.45 MMOL/L (ref 3.5–5.3)
SODIUM SERPL-SCNC: 141.5 MMOL/L (ref 135–146)

## 2023-08-23 PROCEDURE — 83036 HEMOGLOBIN GLYCOSYLATED A1C: CPT | Performed by: STUDENT IN AN ORGANIZED HEALTH CARE EDUCATION/TRAINING PROGRAM

## 2023-08-23 PROCEDURE — 36415 COLL VENOUS BLD VENIPUNCTURE: CPT | Performed by: STUDENT IN AN ORGANIZED HEALTH CARE EDUCATION/TRAINING PROGRAM

## 2023-08-23 PROCEDURE — 99214 OFFICE O/P EST MOD 30 MIN: CPT | Performed by: STUDENT IN AN ORGANIZED HEALTH CARE EDUCATION/TRAINING PROGRAM

## 2023-08-23 PROCEDURE — 80048 BASIC METABOLIC PNL TOTAL CA: CPT | Performed by: STUDENT IN AN ORGANIZED HEALTH CARE EDUCATION/TRAINING PROGRAM

## 2023-08-23 RX ORDER — AMLODIPINE BESYLATE 5 MG/1
5 TABLET ORAL DAILY
Qty: 90 TABLET | Refills: 1 | Status: SHIPPED | OUTPATIENT
Start: 2023-08-23 | End: 2024-02-26

## 2023-08-23 RX ORDER — BUPROPION HYDROCHLORIDE 150 MG/1
150 TABLET ORAL EVERY MORNING
Qty: 90 TABLET | Refills: 1 | Status: SHIPPED | OUTPATIENT
Start: 2023-08-23 | End: 2024-02-26

## 2023-08-23 RX ORDER — LOSARTAN POTASSIUM 100 MG/1
100 TABLET ORAL DAILY
Qty: 90 TABLET | Refills: 1 | Status: SHIPPED | OUTPATIENT
Start: 2023-08-23 | End: 2024-02-26

## 2023-08-23 ASSESSMENT — ANXIETY QUESTIONNAIRES
3. WORRYING TOO MUCH ABOUT DIFFERENT THINGS: SEVERAL DAYS
7. FEELING AFRAID AS IF SOMETHING AWFUL MIGHT HAPPEN: NOT AT ALL
1. FEELING NERVOUS, ANXIOUS, OR ON EDGE: SEVERAL DAYS
2. NOT BEING ABLE TO STOP OR CONTROL WORRYING: SEVERAL DAYS
6. BECOMING EASILY ANNOYED OR IRRITABLE: NOT AT ALL
GAD7 TOTAL SCORE: 3
5. BEING SO RESTLESS THAT IT IS HARD TO SIT STILL: NOT AT ALL
IF YOU CHECKED OFF ANY PROBLEMS ON THIS QUESTIONNAIRE, HOW DIFFICULT HAVE THESE PROBLEMS MADE IT FOR YOU TO DO YOUR WORK, TAKE CARE OF THINGS AT HOME, OR GET ALONG WITH OTHER PEOPLE: NOT DIFFICULT AT ALL
GAD7 TOTAL SCORE: 3

## 2023-08-23 ASSESSMENT — PATIENT HEALTH QUESTIONNAIRE - PHQ9
SUM OF ALL RESPONSES TO PHQ QUESTIONS 1-9: 1
5. POOR APPETITE OR OVEREATING: NOT AT ALL

## 2023-08-23 NOTE — PATIENT INSTRUCTIONS
Labs today    No change to medications     Follow-up for refills 6 months    Follow-up for your knee anytime

## 2023-08-23 NOTE — NURSING NOTE
Chief Complaint   Patient presents with    Recheck Medication     fasting medication check and review, needs refills     Pre-visit Screening:  Immunizations:  up to date  Colonoscopy:  is up to date  Mammogram: is up to date  Asthma Action Test/Plan:  na  PHQ9:  given today   GAD7:  given today   Questioned patient about current smoking habits Pt. Current someday smoker.  Ok to leave detailed message on voice mail for today's visit only yes, phone # [phone] (home) [phone] (work)

## 2023-08-23 NOTE — PROGRESS NOTES
Assessment & Plan       ICD-10-CM    1. Adjustment disorder with mixed anxiety and depressed mood  F43.23 buPROPion (WELLBUTRIN XL) 150 MG 24 hr tablet      2. Essential hypertension, benign  I10 amLODIPine (NORVASC) 5 MG tablet     losartan (COZAAR) 100 MG tablet     Basic Metabolic Panel (BFP)      3. Subclinical hypothyroidism  E03.8 TSH WITH FREE T4 REFLEX ([facility])      4. Prediabetes  R73.03 HEMOGLOBIN A1C (BFP)         MH stable, cont current meds  BP controlled, labs today, cont current meds  Recheck A1c, reviewed lifestyle recs  Monitor thyroid    Patient Instructions   Labs today    No change to medications     Follow-up for refills 6 months    Follow-up for your knee anytime     Reasons to follow-up sooner or seek emergent care reviewed.       Stephen Atkinson MD, [facility]       Subjective     Keyonna Garcia is a 73 year old female who presents to clinic today for the following health issues:    HPI   Chief Complaint   Patient presents with    Recheck Medication     fasting medication check and review, needs refills      Hypertension Follow-up  Doing well, no concerns   Are your blood pressures ever more than 140 on the top number (systolic) OR more   than 90 on the bottom number (diastolic), for example 140/90? No    Depression Followup  How are you doing with your depression since your last visit? No change  Are you having other symptoms that might be associated with depression? No  Are you feeling anxious or having panic attacks?   No  Do you have any concerns with your use of alcohol or other drugs? No    Social History     Tobacco Use    Smoking status: Some Days     Packs/day: 1.00     Years: 20.00     Pack years: 20.00     Types: Cigarettes    Smokeless tobacco: Never    Tobacco comments:     Cut down to 2-3 cigarettes today   Substance Use Topics    Alcohol use: Yes     Alcohol/week: 0.0 standard drinks of alcohol     Comment: 1 drink monthly    Drug use: No          9/27/2022    12:48 PM 2/21/2023     9:32 AM 8/23/2023     9:46 AM   PHQ   PHQ-9 Total Score 9 4 1   Q9: Thoughts of better off dead/self-harm past 2 weeks Not at all Not at all Not at all         9/27/2022    12:48 PM 2/21/2023     9:32 AM 8/23/2023     9:46 AM   HOWARD-7 SCORE   Total Score 5 3 3     Subclinical hypothyroidism Follow-up    Since last visit, patient describes the following symptoms: Weight stable, no hair loss, no skin changes, no constipation, no loose stools          Objective    /82 (BP Location: Right arm, Patient Position: Sitting, Cuff Size: Adult Large)   Pulse 83   Temp 98  F (36.7  C) (Temporal)   Resp 20   Wt 84.8 kg (187 lb)   LMP  (LMP Unknown)   SpO2 96%   BMI 34.20 kg/m    Body mass index is 34.2 kg/m .  Alert, NAD  NC/AT  Sclerae anicteric  Regular  Resp nonlabored  Skin warm and dry  No focal neuro deficits. Speech intact.   Appropriate affect  Normal gait.      Labs reviewed.

## 2023-08-24 LAB — TSH SERPL-ACNC: 4.35 MIU/L (ref 0.4–4.5)

## 2023-10-10 DIAGNOSIS — I10 ESSENTIAL HYPERTENSION, BENIGN: ICD-10-CM

## 2023-10-12 NOTE — TELEPHONE ENCOUNTER
Keyonna Garcia is requesting a refill of:    Pending Prescriptions:                       Disp   Refills    hydrochlorothiazide (HYDRODIURIL) 25 MG t*90 tab*0            Sig: TAKE ONE TABLET BY MOUTH ONE TIME DAILY    Please close encounter if RX was sent. Thanks, Ana Rosa

## 2023-10-13 RX ORDER — HYDROCHLOROTHIAZIDE 25 MG/1
25 TABLET ORAL DAILY
Qty: 90 TABLET | Refills: 0 | Status: SHIPPED | OUTPATIENT
Start: 2023-10-13 | End: 2024-01-19

## 2023-10-17 ENCOUNTER — OFFICE VISIT (OUTPATIENT)
Dept: FAMILY MEDICINE | Facility: CLINIC | Age: 73
End: 2023-10-17

## 2023-10-17 VITALS
RESPIRATION RATE: 20 BRPM | DIASTOLIC BLOOD PRESSURE: 82 MMHG | TEMPERATURE: 97.6 F | WEIGHT: 185 LBS | BODY MASS INDEX: 33.84 KG/M2 | HEART RATE: 87 BPM | SYSTOLIC BLOOD PRESSURE: 134 MMHG | OXYGEN SATURATION: 97 %

## 2023-10-17 DIAGNOSIS — G89.29 CHRONIC PAIN OF RIGHT KNEE: Primary | ICD-10-CM

## 2023-10-17 DIAGNOSIS — M71.21 POPLITEAL CYST, RIGHT: ICD-10-CM

## 2023-10-17 DIAGNOSIS — N18.31 CHRONIC KIDNEY DISEASE, STAGE 3A (H): ICD-10-CM

## 2023-10-17 DIAGNOSIS — M25.561 CHRONIC PAIN OF RIGHT KNEE: Primary | ICD-10-CM

## 2023-10-17 DIAGNOSIS — M17.11 PRIMARY OSTEOARTHRITIS OF RIGHT KNEE: ICD-10-CM

## 2023-10-17 PROCEDURE — 99214 OFFICE O/P EST MOD 30 MIN: CPT | Performed by: STUDENT IN AN ORGANIZED HEALTH CARE EDUCATION/TRAINING PROGRAM

## 2023-10-17 PROCEDURE — 73562 X-RAY EXAM OF KNEE 3: CPT | Mod: RT | Performed by: STUDENT IN AN ORGANIZED HEALTH CARE EDUCATION/TRAINING PROGRAM

## 2023-10-17 NOTE — PATIENT INSTRUCTIONS
Ibuprofen as needed for inflammation     Ice pack for 10-15 min at a time    Can try compression sleeve    PT at [facility]    Please update over the next few weeks, sooner if concerns

## 2023-10-17 NOTE — PROGRESS NOTES
ASSESSMENT & PLAN      ICD-10-CM    1. Chronic pain of right knee  M25.561 XR Knee Right 3 Views    G89.29 Physical Therapy Referral      2. Primary osteoarthritis of right knee  M17.11 Physical Therapy Referral      3. Popliteal cyst, right  M71.21 Physical Therapy Referral      4. Chronic kidney disease, stage 3a (H)  N18.31            R knee pain, chronic/atraumatic.   XRs obtained and reviewed with patient.   Hx and exam most c/w OA with baker cyst.   Reviewed options for treatment and/or further eval, agreed on plan below. Caution with NSAIDs in setting of CKD reviewed.   We did discuss patient's interest in MRI, reasons I do not recommend and patient in agreement.    Patient Instructions   Ibuprofen as needed for inflammation     Ice pack for 10-15 min at a time    Can try compression sleeve    PT at [facility]    Please update over the next few weeks, sooner if concerns       >30 minutes spent on the date of the encounter doing chart review, history and exam, documentation and further activities per the note.    Stephen Atkinson MD, [facility]      -----    SUBJECTIVE  Keyonna Garcia is a/an 73 year old female who is seen for evaluation of     Chief Complaint   Patient presents with    Consult For     Right knee pain that has been around since last year, patient helped push someone's car out of snow and ever since has noticed the pain, feels very tight on the side of the knee and leg area, wants to have an MRI done to find out what is causing the pain, will start by doing xrays today        The patient is seen by themselves.    Date of Onset: ~1 year  Mechanism of injury: Reports insidious onset without acute precipitating event. More sore after pushing car out of snowbank  Location of Pain: right knee  Worsened by: activity  Treatments tried: rest/activity avoidance and ice  Associated symptoms: no distal numbness or tingling; denies redness or warmth    Patient's PMH, PSH, and family  hx reviewed.      OBJECTIVE:  /82 (BP Location: Right arm, Patient Position: Sitting, Cuff Size: Adult Large)   Pulse 87   Temp 97.6  F (36.4  C) (Temporal)   Resp 20   Wt 83.9 kg (185 lb)   LMP  (LMP Unknown)   SpO2 97%   BMI 33.84 kg/m     Alert, NAD  NC/AT  Sclerae anicteric  Regular  Resp nonlabored  Skin warm and dry  No focal neuro deficits. Speech intact. Normal gait.  Appropriate affect    R knee trace effusion, ext mech intact, ROM 0-120, med jt line and popliteal ttp, no distal edema or skin change    RADIOLOGY:  Results for orders placed or performed in visit on 10/17/23   XR Knee Right 3 Views     Status: None    Narrative    Radiologist Consultation/:   Fax:  [phone]  [phone]  _____________________________________________________________________________________________________________________________________________________________________________________________________________________________________________________________________________________________________________________________________________________________________________________________________________________________________________________________________________________________________  PATIENT NAME: MICHELLE, TIERNEY J  YOB: 1950 Age: 73 ACCESSION NUMBER: [id number]  SEX: F ORDERING PROVIDER: Stephen Demetrio  FACILITY: [facility] PRIMARY PROVIDER:  PATIENT ID: [id number] INTERESTED PARTY:  Page 1 of 1  _________________________________________________________________________________________________________________________________________________________________________________________________________________________________________________________________________________________________________________________________________________________________________________________  EXAM: XRAY KNEE,3 VWS RIGHT  LOCATION: [facility]  DATE:  10/17/2023  INDICATION: Right knee pain.  COMPARISON: None.  IMPRESSION: Both knees are negative for fracture. Slight degenerative narrowing within the patellofemoral  compartment bilaterally but no significant osteophytic spurring. No effusion.  SIGNED BY: Gary Dobbs MD 10/17/2023 6:11 PM

## 2023-10-17 NOTE — NURSING NOTE
Chief Complaint   Patient presents with    Consult For     Right knee pain       Pre-visit Screening:  Immunizations:  up to date  Colonoscopy:  is up to date  Mammogram: is up to date  Asthma Action Test/Plan:  na  PHQ9:  na  GAD7:  na  Questioned patient about current smoking habits Pt. currently smokes.  Advised about smoking cessation.  Ok to leave detailed message on voice mail for today's visit only yes, phone # [phone] (home) [phone] (work)

## 2024-01-17 DIAGNOSIS — I10 ESSENTIAL HYPERTENSION, BENIGN: ICD-10-CM

## 2024-01-18 NOTE — TELEPHONE ENCOUNTER
Pt due for OV next month.    Keyonna Garcia is requesting a refill of:    Pending Prescriptions:                       Disp   Refills    hydrochlorothiazide (HYDRODIURIL) 25 MG t*30 tab*0            Sig: TAKE ONE TABLET BY MOUTH ONE TIME DAILY

## 2024-01-19 RX ORDER — HYDROCHLOROTHIAZIDE 25 MG/1
25 TABLET ORAL DAILY
Qty: 30 TABLET | Refills: 0 | Status: SHIPPED | OUTPATIENT
Start: 2024-01-19 | End: 2024-02-26

## 2024-02-04 DIAGNOSIS — E78.2 MIXED HYPERLIPIDEMIA: ICD-10-CM

## 2024-02-08 RX ORDER — PRAVASTATIN SODIUM 20 MG
20 TABLET ORAL AT BEDTIME
COMMUNITY
Start: 2024-02-08

## 2024-02-08 NOTE — TELEPHONE ENCOUNTER
Received incoming refill request for  Pending Prescriptions:                       Disp   Refills    pravastatin (PRAVACHOL) 20 MG tablet [Pha*90 tab*0            Sig: Take 1 tablet (20 mg) by mouth At Bedtime.    Patient is now due for his annual medication check visit.  [place] message sent.

## 2024-02-19 DIAGNOSIS — I10 ESSENTIAL HYPERTENSION, BENIGN: ICD-10-CM

## 2024-02-25 RX ORDER — HYDROCHLOROTHIAZIDE 25 MG/1
25 TABLET ORAL DAILY
COMMUNITY
Start: 2024-02-25

## 2024-02-26 ENCOUNTER — OFFICE VISIT (OUTPATIENT)
Dept: FAMILY MEDICINE | Facility: CLINIC | Age: 74
End: 2024-02-26

## 2024-02-26 VITALS
WEIGHT: 188 LBS | HEART RATE: 81 BPM | HEIGHT: 62 IN | OXYGEN SATURATION: 94 % | TEMPERATURE: 98.2 F | BODY MASS INDEX: 34.6 KG/M2 | SYSTOLIC BLOOD PRESSURE: 122 MMHG | DIASTOLIC BLOOD PRESSURE: 81 MMHG

## 2024-02-26 DIAGNOSIS — I10 ESSENTIAL HYPERTENSION, BENIGN: ICD-10-CM

## 2024-02-26 DIAGNOSIS — F17.200 SMOKER: ICD-10-CM

## 2024-02-26 DIAGNOSIS — Z23 NEED FOR VACCINATION: Primary | ICD-10-CM

## 2024-02-26 DIAGNOSIS — K21.9 GASTROESOPHAGEAL REFLUX DISEASE WITHOUT ESOPHAGITIS: ICD-10-CM

## 2024-02-26 DIAGNOSIS — E78.2 MIXED HYPERLIPIDEMIA: ICD-10-CM

## 2024-02-26 DIAGNOSIS — F43.23 ADJUSTMENT DISORDER WITH MIXED ANXIETY AND DEPRESSED MOOD: ICD-10-CM

## 2024-02-26 DIAGNOSIS — N18.31 CHRONIC KIDNEY DISEASE, STAGE 3A (H): ICD-10-CM

## 2024-02-26 DIAGNOSIS — Z12.31 ENCOUNTER FOR SCREENING MAMMOGRAM FOR BREAST CANCER: ICD-10-CM

## 2024-02-26 DIAGNOSIS — H10.45 CHRONIC ALLERGIC CONJUNCTIVITIS: ICD-10-CM

## 2024-02-26 LAB
BUN SERPL-MCNC: 22 MG/DL (ref 7–25)
BUN/CREATININE RATIO: 16.4 (ref 6–32)
CALCIUM SERPL-MCNC: 9.7 MG/DL (ref 8.6–10.3)
CHLORIDE SERPLBLD-SCNC: 102.9 MMOL/L (ref 98–110)
CHOLEST SERPL-MCNC: 244 MG/DL (ref 0–199)
CHOLEST/HDLC SERPL: 3 {RATIO} (ref 0–5)
CO2 SERPL-SCNC: 27.7 MMOL/L (ref 20–32)
CREAT SERPL-MCNC: 1.34 MG/DL (ref 0.6–1.3)
ERYTHROCYTE [DISTWIDTH] IN BLOOD BY AUTOMATED COUNT: 13.6 %
GFR SERPL CREATININE-BSD FRML MDRD: 42 ML/MIN/1.73M2
GLUCOSE SERPL-MCNC: 114 MG/DL (ref 60–99)
HCT VFR BLD AUTO: 43.5 % (ref 35–47)
HDLC SERPL-MCNC: 74 MG/DL (ref 40–150)
HEMOGLOBIN: 13.9 G/DL (ref 11.7–15.7)
LDLC SERPL CALC-MCNC: 143 MG/DL (ref 0–130)
MCH RBC QN AUTO: 29.3 PG (ref 26–33)
MCHC RBC AUTO-ENTMCNC: 32 G/DL (ref 31–36)
MCV RBC AUTO: 91.6 FL (ref 78–100)
PLATELET COUNT - QUEST: 307 10^9/L (ref 150–375)
POTASSIUM SERPL-SCNC: 4.34 MMOL/L (ref 3.5–5.3)
RBC # BLD AUTO: 4.75 10*12/L (ref 3.8–5.2)
SODIUM SERPL-SCNC: 141.5 MMOL/L (ref 135–146)
TRIGL SERPL-MCNC: 136 MG/DL (ref 0–149)
WBC # BLD AUTO: 6.3 10*9/L (ref 4–11)

## 2024-02-26 PROCEDURE — 90677 PCV20 VACCINE IM: CPT | Performed by: STUDENT IN AN ORGANIZED HEALTH CARE EDUCATION/TRAINING PROGRAM

## 2024-02-26 PROCEDURE — 90471 IMMUNIZATION ADMIN: CPT | Performed by: STUDENT IN AN ORGANIZED HEALTH CARE EDUCATION/TRAINING PROGRAM

## 2024-02-26 PROCEDURE — 80061 LIPID PANEL: CPT | Performed by: STUDENT IN AN ORGANIZED HEALTH CARE EDUCATION/TRAINING PROGRAM

## 2024-02-26 PROCEDURE — 85027 COMPLETE CBC AUTOMATED: CPT | Performed by: STUDENT IN AN ORGANIZED HEALTH CARE EDUCATION/TRAINING PROGRAM

## 2024-02-26 PROCEDURE — 99214 OFFICE O/P EST MOD 30 MIN: CPT | Mod: 25 | Performed by: STUDENT IN AN ORGANIZED HEALTH CARE EDUCATION/TRAINING PROGRAM

## 2024-02-26 PROCEDURE — 80048 BASIC METABOLIC PNL TOTAL CA: CPT | Performed by: STUDENT IN AN ORGANIZED HEALTH CARE EDUCATION/TRAINING PROGRAM

## 2024-02-26 PROCEDURE — 36415 COLL VENOUS BLD VENIPUNCTURE: CPT | Performed by: STUDENT IN AN ORGANIZED HEALTH CARE EDUCATION/TRAINING PROGRAM

## 2024-02-26 RX ORDER — AMLODIPINE BESYLATE 5 MG/1
5 TABLET ORAL DAILY
Qty: 90 TABLET | Refills: 1 | Status: SHIPPED | OUTPATIENT
Start: 2024-02-26 | End: 2024-08-20

## 2024-02-26 RX ORDER — BUPROPION HYDROCHLORIDE 150 MG/1
150 TABLET ORAL EVERY MORNING
Qty: 90 TABLET | Refills: 1 | Status: CANCELLED | OUTPATIENT
Start: 2024-02-26

## 2024-02-26 RX ORDER — AMLODIPINE BESYLATE 5 MG/1
5 TABLET ORAL DAILY
Qty: 90 TABLET | Refills: 1 | Status: CANCELLED | OUTPATIENT
Start: 2024-02-26

## 2024-02-26 RX ORDER — MONTELUKAST SODIUM 10 MG/1
1 TABLET ORAL AT BEDTIME
Qty: 90 TABLET | Refills: 3 | Status: SHIPPED | OUTPATIENT
Start: 2024-02-26

## 2024-02-26 RX ORDER — BUPROPION HYDROCHLORIDE 150 MG/1
150 TABLET ORAL EVERY MORNING
Qty: 90 TABLET | Refills: 1 | Status: SHIPPED | OUTPATIENT
Start: 2024-02-26 | End: 2024-08-20

## 2024-02-26 RX ORDER — PRAVASTATIN SODIUM 20 MG
20 TABLET ORAL AT BEDTIME
Qty: 90 TABLET | Refills: 3 | Status: SHIPPED | OUTPATIENT
Start: 2024-02-26

## 2024-02-26 RX ORDER — HYDROCHLOROTHIAZIDE 25 MG/1
25 TABLET ORAL DAILY
Qty: 90 TABLET | Refills: 1 | Status: SHIPPED | OUTPATIENT
Start: 2024-02-26 | End: 2024-08-20

## 2024-02-26 RX ORDER — LOSARTAN POTASSIUM 100 MG/1
100 TABLET ORAL DAILY
Qty: 90 TABLET | Refills: 1 | Status: SHIPPED | OUTPATIENT
Start: 2024-02-26 | End: 2024-08-20

## 2024-02-26 RX ORDER — HYDROCHLOROTHIAZIDE 25 MG/1
25 TABLET ORAL DAILY
Qty: 30 TABLET | Refills: 0 | Status: CANCELLED | OUTPATIENT
Start: 2024-02-26

## 2024-02-26 NOTE — PATIENT INSTRUCTIONS
Blood work today    No change to medications    Mammogram and chest CT at [facility]   [facility]  [address]  [address]  [facility] [address]  [phone] -  Main Scheduling    Routine follow-up with me in 6 months, sooner if needed

## 2024-02-26 NOTE — PROGRESS NOTES
Assessment & Plan       ICD-10-CM    1. Need for vaccination  Z23 PNEUMOCOCCAL 20 VALENT CONJUGATE (PREVNAR 20)      2. Essential hypertension, benign  I10 Basic Metabolic Panel (BFP)     amLODIPine (NORVASC) 5 MG tablet     hydrochlorothiazide (HYDRODIURIL) 25 MG tablet     losartan (COZAAR) 100 MG tablet      3. Adjustment disorder with mixed anxiety and depressed mood  F43.23 buPROPion (WELLBUTRIN XL) 150 MG 24 hr tablet      4. Chronic kidney disease, stage 3a (H)  N18.31 Hemogram Platelet (BFP)      5. Chronic allergic conjunctivitis  H10.45 montelukast (SINGULAIR) 10 MG tablet      6. Gastroesophageal reflux disease without esophagitis  K21.9 omeprazole (PRILOSEC) 20 MG DR capsule      7. Mixed hyperlipidemia  E78.2 Lipid Panel (BFP)     pravastatin (PRAVACHOL) 20 MG tablet      8. Smoker  F17.200 CT Chest w/o Contrast     Radiology Referral      9. Encounter for screening mammogram for breast cancer  Z12.31 CANCELED: Mammo Screening digital (bilat)           Patient Instructions   Blood work today    No change to medications    Mammogram and chest CT at [facility]   [facility]  [address]  [address]  [facility] [address]  [phone] -  Main Scheduling    Routine follow-up with me in 6 months, sooner if needed      Reasons to follow-up sooner or seek emergent care reviewed.     35 minutes spent on the date of the encounter doing chart review, history and exam, documentation and further activities per the note      Stephen Atkinson MD, [facility]      Subjective     Keyonna Garcia is a 73 year old female who presents to clinic today for the following health issues:    HPI   Chief Complaint   Patient presents with    Hypertension     Pt here for a BP recheck. Is also fasting.      Hypertension Follow-up  Doing well, no concerns   Are your blood pressures ever more than 140 on the top number (systolic) OR more       than 90 on the bottom number (diastolic), for  "example 140/90? No     Depression Followup  How are you doing with your depression since your last visit? No change  Are you having other symptoms that might be associated with depression? No  Are you feeling anxious or having panic attacks?   No  Do you have any concerns with your use of alcohol or other drugs? No    Social History     Tobacco Use    Smoking status: Some Days     Current packs/day: 1.00     Average packs/day: 1 pack/day for 20.0 years (20.0 ttl pk-yrs)     Types: Cigarettes    Smokeless tobacco: Never    Tobacco comments:     Cut down to 2-3 cigarettes today   Substance Use Topics    Alcohol use: Yes     Alcohol/week: 0.0 standard drinks of alcohol     Comment: 1 drink monthly    Drug use: No         9/27/2022    12:48 PM 2/21/2023     9:32 AM 8/23/2023     9:46 AM   PHQ   PHQ-9 Total Score 9 4 1   Q9: Thoughts of better off dead/self-harm past 2 weeks Not at all Not at all Not at all         9/27/2022    12:48 PM 2/21/2023     9:32 AM 8/23/2023     9:46 AM   HOWARD-7 SCORE   Total Score 5 3 3           Objective    /81 (BP Location: Right arm, Patient Position: Sitting, Cuff Size: Adult Large)   Pulse 81   Temp 98.2  F (36.8  C) (Temporal)   Ht 1.575 m (5' 2\")   Wt 85.3 kg (188 lb)   LMP  (LMP Unknown)   SpO2 94%   BMI 34.39 kg/m    Body mass index is 34.39 kg/m .  Alert, NAD  NC/AT  Sclerae anicteric  RRR  Resp nonlabored  Skin warm and dry  No focal neuro deficits. Speech intact. Normal gait.  Appropriate affect       Labs reviewed.  Results for orders placed or performed in visit on 02/26/24   Lipid Panel (BFP)     Status: Abnormal   Result Value Ref Range    Cholesterol 244 (A) 0 - 199 mg/dL    Triglycerides 136 0 - 149 mg/dL    HDL Cholesterol 74 40 - 150 mg/dL    LDL Cholesterol Direct 143 (A) 0 - 130 mg/dL    Cholesterol/HDL Ratio 3 0 - 5   Basic Metabolic Panel (BFP)     Status: Abnormal   Result Value Ref Range    Carbon Dioxide 27.7 20 - 32 mmol/L    Creatinine 1.34 (A) 0.60 - " 1.30 mg/dL    Glucose 114 (A) 60 - 99 mg/dL    Sodium 141.5 135 - 146 mmol/L    Potassium 4.34 3.5 - 5.3 mmol/L    Chloride 102.9 98 - 110 mmol/L    Urea Nitrogen 22 7 - 25 mg/dL    Calcium 9.7 8.6 - 10.3 mg/dL    BUN/Creatinine Ratio 16.4 6 - 32    GFR Estimate 42 (A) >60 ml/min/1.73m2   Hemogram Platelet (BFP)     Status: None   Result Value Ref Range    WBC 6.3 4.0 - 11 10*9/L    RBC Count 4.75 3.8 - 5.2 10*12/L    Hemoglobin 13.9 11.7 - 15.7 g/dL    Hematocrit 43.5 35.0 - 47.0 %    MCV 91.6 78 - 100 fL    MCH 29.3 26 - 33 pg    MCHC 32.0 31 - 36 g/dL    RDW 13.6 %    Platelet Count 307 150 - 375 10^9/L

## 2024-02-26 NOTE — NURSING NOTE
Chief Complaint   Patient presents with    Hypertension     Pt here for a BP recheck. Is also fasting.    Pre-visit Screening:  Immunizations:  up to date with prevnar vaccine  Colonoscopy:  is up to date  Mammogram: is up to date  Asthma Action Test/Plan:  na  PHQ9:  PHQ2  GAD7:  na  Questioned patient about current smoking habits Pt. smokes  cigerettes some days  Ok to leave detailed message on voice mail for today's visit only , phone # [phone]

## 2024-02-26 NOTE — TELEPHONE ENCOUNTER
Received incoming refill request for  Pending Prescriptions:                       Disp   Refills    hydrochlorothiazide (HYDRODIURIL) 25 MG t*30 tab*0            Sig: TAKE ONE TABLET BY MOUTH ONE TIME DAILY    Pt is due for an OV and is being seen tomorrow where full refills will be sent in.

## 2024-03-07 NOTE — NURSING NOTE
Keyonna Garcia is here for fasting blood work.  Questioned patient about current smoking habits.  Pt. currently smokes.  Advised about smoking cessation. Will smoke occasional cigarette  Body mass index is 33.67 kg/(m^2).  PULSE regular  My Chart: active    Pre-visit planning  Immunizations - up to date  Colonoscopy - is up to date  Mammogram - is due and ordered today  Asthma -   PHQ9  HOWARD-7    The patient has verbalized that it is ok to leave a detailed voice message on the patient's cell phone with results/recommendations from this visit.      show

## 2024-08-04 ENCOUNTER — HEALTH MAINTENANCE LETTER (OUTPATIENT)
Age: 74
End: 2024-08-04

## 2024-08-20 ENCOUNTER — OFFICE VISIT (OUTPATIENT)
Dept: FAMILY MEDICINE | Facility: CLINIC | Age: 74
End: 2024-08-20

## 2024-08-20 VITALS
SYSTOLIC BLOOD PRESSURE: 138 MMHG | TEMPERATURE: 97.5 F | DIASTOLIC BLOOD PRESSURE: 76 MMHG | WEIGHT: 186 LBS | OXYGEN SATURATION: 96 % | BODY MASS INDEX: 34.02 KG/M2 | HEART RATE: 71 BPM

## 2024-08-20 DIAGNOSIS — I10 ESSENTIAL HYPERTENSION, BENIGN: ICD-10-CM

## 2024-08-20 DIAGNOSIS — F43.23 ADJUSTMENT DISORDER WITH MIXED ANXIETY AND DEPRESSED MOOD: ICD-10-CM

## 2024-08-20 LAB
BUN SERPL-MCNC: 19 MG/DL (ref 7–25)
BUN/CREATININE RATIO: 15 (ref 6–32)
CALCIUM SERPL-MCNC: 9.9 MG/DL (ref 8.6–10.3)
CHLORIDE SERPLBLD-SCNC: 103.1 MMOL/L (ref 98–110)
CO2 SERPL-SCNC: 26.5 MMOL/L (ref 20–32)
CREAT SERPL-MCNC: 1.31 MG/DL (ref 0.6–1.3)
GFR SERPL CREATININE-BSD FRML MDRD: 43 ML/MIN/1.73M2
GLUCOSE SERPL-MCNC: 105 MG/DL (ref 60–99)
POTASSIUM SERPL-SCNC: 4.4 MMOL/L (ref 3.5–5.3)
SODIUM SERPL-SCNC: 140.8 MMOL/L (ref 135–146)

## 2024-08-20 PROCEDURE — 99214 OFFICE O/P EST MOD 30 MIN: CPT | Performed by: STUDENT IN AN ORGANIZED HEALTH CARE EDUCATION/TRAINING PROGRAM

## 2024-08-20 PROCEDURE — 80048 BASIC METABOLIC PNL TOTAL CA: CPT | Performed by: STUDENT IN AN ORGANIZED HEALTH CARE EDUCATION/TRAINING PROGRAM

## 2024-08-20 PROCEDURE — G2211 COMPLEX E/M VISIT ADD ON: HCPCS | Performed by: STUDENT IN AN ORGANIZED HEALTH CARE EDUCATION/TRAINING PROGRAM

## 2024-08-20 PROCEDURE — 36415 COLL VENOUS BLD VENIPUNCTURE: CPT | Performed by: STUDENT IN AN ORGANIZED HEALTH CARE EDUCATION/TRAINING PROGRAM

## 2024-08-20 RX ORDER — HYDROCHLOROTHIAZIDE 25 MG/1
25 TABLET ORAL DAILY
Qty: 90 TABLET | Refills: 1 | Status: SHIPPED | OUTPATIENT
Start: 2024-08-20

## 2024-08-20 RX ORDER — BUPROPION HYDROCHLORIDE 150 MG/1
150 TABLET ORAL EVERY MORNING
Qty: 90 TABLET | Refills: 1 | Status: SHIPPED | OUTPATIENT
Start: 2024-08-20

## 2024-08-20 RX ORDER — LOSARTAN POTASSIUM 100 MG/1
100 TABLET ORAL DAILY
Qty: 90 TABLET | Refills: 1 | Status: SHIPPED | OUTPATIENT
Start: 2024-08-20

## 2024-08-20 RX ORDER — AMLODIPINE BESYLATE 5 MG/1
5 TABLET ORAL DAILY
Qty: 90 TABLET | Refills: 1 | Status: SHIPPED | OUTPATIENT
Start: 2024-08-20

## 2024-08-20 ASSESSMENT — ANXIETY QUESTIONNAIRES
GAD7 TOTAL SCORE: 5
IF YOU CHECKED OFF ANY PROBLEMS ON THIS QUESTIONNAIRE, HOW DIFFICULT HAVE THESE PROBLEMS MADE IT FOR YOU TO DO YOUR WORK, TAKE CARE OF THINGS AT HOME, OR GET ALONG WITH OTHER PEOPLE: NOT DIFFICULT AT ALL
3. WORRYING TOO MUCH ABOUT DIFFERENT THINGS: SEVERAL DAYS
5. BEING SO RESTLESS THAT IT IS HARD TO SIT STILL: SEVERAL DAYS
2. NOT BEING ABLE TO STOP OR CONTROL WORRYING: SEVERAL DAYS
7. FEELING AFRAID AS IF SOMETHING AWFUL MIGHT HAPPEN: NOT AT ALL
1. FEELING NERVOUS, ANXIOUS, OR ON EDGE: SEVERAL DAYS
GAD7 TOTAL SCORE: 5
6. BECOMING EASILY ANNOYED OR IRRITABLE: NOT AT ALL

## 2024-08-20 ASSESSMENT — PATIENT HEALTH QUESTIONNAIRE - PHQ9
5. POOR APPETITE OR OVEREATING: SEVERAL DAYS
SUM OF ALL RESPONSES TO PHQ QUESTIONS 1-9: 2

## 2024-08-20 NOTE — NURSING NOTE
Chief Complaint   Patient presents with    Recheck Medication     Fasting medication check and review     Pre-visit Screening:  Immunizations:  up to date  Colonoscopy:  is up to date  Mammogram: is up to date  Asthma Action Test/Plan:  na  PHQ9:  na  GAD7:  na  Questioned patient about current smoking habits Pt. currently smokes.  Advised about smoking cessation.  Ok to leave detailed message on voice mail for today's visit only Yes, phone # [phone] (home) [phone] (work)

## 2024-08-20 NOTE — PROGRESS NOTES
Assessment & Plan     1. Essential hypertension, benign  Stable, labs today, continue current medication   - amLODIPine (NORVASC) 5 MG tablet; Take 1 tablet (5 mg) by mouth daily  Dispense: 90 tablet; Refill: 1  - hydrochlorothiazide (HYDRODIURIL) 25 MG tablet; Take 1 tablet (25 mg) by mouth daily  Dispense: 90 tablet; Refill: 1  - losartan (COZAAR) 100 MG tablet; Take 1 tablet (100 mg) by mouth daily  Dispense: 90 tablet; Refill: 1  - Basic Metabolic Panel (BFP)    2. Adjustment disorder with mixed anxiety and depressed mood  Stable, continue current medication   - buPROPion (WELLBUTRIN XL) 150 MG 24 hr tablet; Take 1 tablet (150 mg) by mouth every morning  Dispense: 90 tablet; Refill: 1     Patient Instructions   Blood work    No change to medications     Routine follow-up in 6 mos     Reasons to follow-up sooner or seek emergent care reviewed.       Stephen Atkinson MD, [facility]      Subjective     Keyonna Garcia is a 74 year old female who presents to clinic today for the following health issues:    HPI   Chief Complaint   Patient presents with    Recheck Medication     Fasting medication check and review      Hypertension Follow-up  Doing well, no concerns   Are your blood pressures ever more than 140 on the top number (systolic) OR more       than 90 on the bottom number (diastolic), for example 140/90? No     Depression Followup  How are you doing with your depression since your last visit? No change  Are you having other symptoms that might be associated with depression? No  Are you feeling anxious or having panic attacks?   No  Do you have any concerns with your use of alcohol or other drugs? No    Social History     Tobacco Use    Smoking status: Some Days     Current packs/day: 1.00     Average packs/day: 1 pack/day for 20.0 years (20.0 ttl pk-yrs)     Types: Cigarettes    Smokeless tobacco: Never    Tobacco comments:     Cut down to 2-3 cigarettes today   Substance Use Topics     Alcohol use: Yes     Alcohol/week: 0.0 standard drinks of alcohol     Comment: 1 drink monthly    Drug use: No         2/21/2023     9:32 AM 8/23/2023     9:46 AM 8/20/2024     9:36 AM   PHQ   PHQ-9 Total Score 4 1 2   Q9: Thoughts of better off dead/self-harm past 2 weeks Not at all Not at all Not at all         2/21/2023     9:32 AM 8/23/2023     9:46 AM 8/20/2024     9:36 AM   HOWARD-7 SCORE   Total Score 3 3 5           Objective    /76 (BP Location: Left arm, Patient Position: Sitting, Cuff Size: Adult Large)   Pulse 71   Temp 97.5  F (36.4  C) (Temporal)   Wt 84.4 kg (186 lb)   LMP  (LMP Unknown)   SpO2 96%   BMI 34.02 kg/m    Body mass index is 34.02 kg/m .  Alert, NAD  NC/AT  Sclerae anicteric  RRR  Resp nonlabored  Skin warm and dry  No focal neuro deficits. Speech intact. Normal gait.  Appropriate affect       Labs reviewed.

## 2024-09-06 ENCOUNTER — OFFICE VISIT (OUTPATIENT)
Dept: URGENT CARE | Facility: URGENT CARE | Age: 74
End: 2024-09-06
Payer: COMMERCIAL

## 2024-09-06 ENCOUNTER — ANCILLARY PROCEDURE (OUTPATIENT)
Dept: GENERAL RADIOLOGY | Facility: CLINIC | Age: 74
End: 2024-09-06
Attending: PHYSICIAN ASSISTANT
Payer: COMMERCIAL

## 2024-09-06 VITALS
RESPIRATION RATE: 16 BRPM | DIASTOLIC BLOOD PRESSURE: 82 MMHG | HEART RATE: 72 BPM | OXYGEN SATURATION: 97 % | TEMPERATURE: 97.8 F | SYSTOLIC BLOOD PRESSURE: 130 MMHG

## 2024-09-06 DIAGNOSIS — R09.89 CHEST CONGESTION: ICD-10-CM

## 2024-09-06 DIAGNOSIS — R06.2 WHEEZING: ICD-10-CM

## 2024-09-06 DIAGNOSIS — B96.89 BACTERIAL SINUSITIS: Primary | ICD-10-CM

## 2024-09-06 DIAGNOSIS — J32.9 BACTERIAL SINUSITIS: Primary | ICD-10-CM

## 2024-09-06 DIAGNOSIS — J39.8 CONGESTION OF UPPER RESPIRATORY TRACT: ICD-10-CM

## 2024-09-06 DIAGNOSIS — R05.9 COUGH, UNSPECIFIED TYPE: ICD-10-CM

## 2024-09-06 PROCEDURE — 99214 OFFICE O/P EST MOD 30 MIN: CPT | Performed by: PHYSICIAN ASSISTANT

## 2024-09-06 PROCEDURE — 71046 X-RAY EXAM CHEST 2 VIEWS: CPT | Mod: TC | Performed by: STUDENT IN AN ORGANIZED HEALTH CARE EDUCATION/TRAINING PROGRAM

## 2024-09-06 PROCEDURE — 87635 SARS-COV-2 COVID-19 AMP PRB: CPT | Performed by: PHYSICIAN ASSISTANT

## 2024-09-06 RX ORDER — PREDNISONE 20 MG/1
20 TABLET ORAL 2 TIMES DAILY
Qty: 10 TABLET | Refills: 0 | Status: SHIPPED | OUTPATIENT
Start: 2024-09-06

## 2024-09-06 RX ORDER — BENZONATATE 200 MG/1
200 CAPSULE ORAL 3 TIMES DAILY PRN
Qty: 21 CAPSULE | Refills: 0 | Status: SHIPPED | OUTPATIENT
Start: 2024-09-06 | End: 2024-09-13

## 2024-09-06 RX ORDER — ALBUTEROL SULFATE 90 UG/1
2 AEROSOL, METERED RESPIRATORY (INHALATION) EVERY 6 HOURS
Qty: 8.5 G | Refills: 0 | Status: SHIPPED | OUTPATIENT
Start: 2024-09-06

## 2024-09-06 RX ORDER — CEFDINIR 300 MG/1
300 CAPSULE ORAL 2 TIMES DAILY
Qty: 20 CAPSULE | Refills: 0 | Status: SHIPPED | OUTPATIENT
Start: 2024-09-06 | End: 2024-09-16

## 2024-09-06 NOTE — PROGRESS NOTES
Assessment & Plan     Bacterial sinusitis    You have been diagnosed with a bacterial sinus infection. Sinusitis can occur during a cold. It can also happen due to allergies to pollens and other particles in the air.  A sinus infection can sometimes cause fever, headache, and facial pain. There is often green or yellow fluid draining from the nose or into the back of the throat (post-nasal drip). This infection is treated with antibiotics.  Home care  Take the full course of antibiotics as instructed. Don't stop taking them, even when you feel better.  Drink plenty of water, hot tea, and other liquids as directed by the healthcare provider. This may help thin nasal mucus. It also may help your sinuses drain fluids.  Heat may help soothe painful areas of your face. Use a towel soaked in hot water. Or,  the shower and direct the warm spray onto your face. Using a vaporizer along with a menthol rub at night may also help soothe symptoms.     - cefdinir (OMNICEF) 300 MG capsule  Dispense: 20 capsule; Refill: 0    Chest congestion    Chest xray Negative for acute findings, read by Tejas MCGARRY at time of visit.    Prednisone for barky cough and lung inflammation  - XR Chest 2 Views  - predniSONE (DELTASONE) 20 MG tablet  Dispense: 10 tablet; Refill: 0    Wheezing    Proventil for wheezing  - XR Chest 2 Views  - albuterol (PROVENTIL HFA) 108 (90 Base) MCG/ACT inhaler  Dispense: 8.5 g; Refill: 0    Congestion of upper respiratory tract    Covid pending  Bronchitis is inflammation of the bronchial tubes, which carry air to the lungs. The tubes swell and produce mucus, or phlegm. The mucus and inflamed bronchial tubes make you cough. You may have trouble breathing.  Most cases of bronchitis are caused by viruses but in your case we are more concerned about a bacterial infection. . Antibiotics usually are helpful in this situation to help you clear this bacterial infection.  Bronchitis usually develops  "rapidly and lasts about 2 to 3 weeks in otherwise healthy people.    - Symptomatic COVID-19 Virus (Coronavirus) by PCR Nose  - predniSONE (DELTASONE) 20 MG tablet  Dispense: 10 tablet; Refill: 0    Cough, unspecified type    Tessalon for coughing  - benzonatate (TESSALON) 200 MG capsule  Dispense: 21 capsule; Refill: 0       At today's visit with Keyonna Garcia , we discussed results, diagnosis, medications and formulated a plan.  We also discussed red flags for immediate return to clinic/ER, as well as indications for follow up with PCP if not improved in 3 days. Patient understood and agreed to plan. Keyonna Garcia was discharged with stable vitals and has no further questions.       No follow-ups on file.    Tejas Alanis, [facility], PA-DIANA  [facility] is a 74 year old female who presents to clinic today for the following health issues:  Chief Complaint   Patient presents with    URI     Patient here with complaint of \"barkey\" cough, sinus congestion, slight SOB and \"foggy\" head.  Symptoms started a couple of days ago.        HPI  Review of Systems  Constitutional, HEENT, cardiovascular, pulmonary, GI, , musculoskeletal, neuro, skin, endocrine and psych systems are negative, except as otherwise noted.      Objective    /82 (BP Location: Left arm, Patient Position: Sitting, Cuff Size: Adult Regular)   Pulse 72   Temp 97.8  F (36.6  C) (Tympanic)   Resp 16   LMP  (LMP Unknown)   SpO2 97%   Physical Exam   GENERAL: alert and no distress  EYES: Eyes grossly normal to inspection, PERRL and conjunctivae and sclerae normal  HENT: ear canals and TM's normal, nose and mouth without ulcers or lesions  NECK: no adenopathy, no asymmetry, masses, or scars  RESP: expiratory wheezes bilateral  CV: regular rate and rhythm, normal S1 S2, no S3 or S4, no murmur, click or rub, no peripheral edema  MS: no gross musculoskeletal defects noted, no edema  SKIN: no suspicious " lesions or rashes  NEURO: Normal strength and tone, mentation intact and speech normal  PSYCH: mentation appears normal, affect normal/bright      Chest xray Negative for acute findings, read by Tejas MCGARRY at time of visit.

## 2024-09-07 LAB — SARS-COV-2 RNA RESP QL NAA+PROBE: NEGATIVE

## 2024-11-19 ENCOUNTER — OFFICE VISIT (OUTPATIENT)
Dept: FAMILY MEDICINE | Facility: CLINIC | Age: 74
End: 2024-11-19

## 2024-11-19 VITALS
WEIGHT: 188 LBS | SYSTOLIC BLOOD PRESSURE: 120 MMHG | OXYGEN SATURATION: 97 % | RESPIRATION RATE: 18 BRPM | BODY MASS INDEX: 34.39 KG/M2 | HEART RATE: 75 BPM | DIASTOLIC BLOOD PRESSURE: 74 MMHG

## 2024-11-19 DIAGNOSIS — G89.29 CHRONIC BILATERAL LOW BACK PAIN, UNSPECIFIED WHETHER SCIATICA PRESENT: ICD-10-CM

## 2024-11-19 DIAGNOSIS — M25.552 HIP PAIN, LEFT: Primary | ICD-10-CM

## 2024-11-19 DIAGNOSIS — N18.30 STAGE 3 CHRONIC KIDNEY DISEASE, UNSPECIFIED WHETHER STAGE 3A OR 3B CKD (H): ICD-10-CM

## 2024-11-19 DIAGNOSIS — M54.50 CHRONIC BILATERAL LOW BACK PAIN, UNSPECIFIED WHETHER SCIATICA PRESENT: ICD-10-CM

## 2024-11-19 NOTE — NURSING NOTE
Chief Complaint   Patient presents with    Consult For     Left hip pain that started a month ago, feels some pain radiating towards back but main pain is more in groin area, felt this same pain on her right side before she had her hip replacement done      Pre-visit Screening:  Immunizations:  up to date  Colonoscopy:  is up to date  Mammogram: is up to date  Asthma Action Test/Plan:  na  PHQ9:  na  GAD7:  na  Questioned patient about current smoking habits Pt. currently smokes.  Advised about smoking cessation.  Ok to leave detailed message on voice mail for today's visit only yes, phone # [phone] (home) [phone] (work)

## 2024-11-19 NOTE — PROGRESS NOTES
"ASSESSMENT & PLAN      ICD-10-CM    1. Hip pain, left  M25.552 XR Hip Left 2-3 Views      2. Chronic bilateral low back pain, unspecified whether sciatica present  M54.50 XR Lumbar Spine 2/3 Views    G89.29       3. Stage 3 chronic kidney disease, unspecified whether stage 3a or 3b CKD (H)  N18.30 Adult Nephrology  Referral - To a [facility] Location (Use POS/Location)           XRs obtained and reviewed with patient.   Hx and exam most c/w low back as primary pain generator, moderate hip OA asx on exam today. No neuro red flags.  Reviewed options for treatment and/or further eval, agreed on plan below.   Hx CKD3, rec avoid nsaids, agreed on Nephrology consult.     Patient Instructions   Tylenol 1000mg 3x/day safe    Start with home exercises, let me know if you want PT referral    Can coordinate hip injection or surgery consult if desired    [facility]rology [facility]   [address]  [address]  [place] [address]  [phone] - appt line    30 minutes spent on the date of the encounter doing chart review, history and exam, documentation and further activities per the note.    Stephen Atkinson MD, [facility]      -----    SUBJECTIVE  Keyonna Garcia is a/an 74 year old female who is seen for evaluation of     Chief Complaint   Patient presents with    Consult For     Left hip pain that started a month ago, feels some pain radiating towards back but main pain is more in groin area, felt this same pain on her right side before she had her hip replacement done      The patient is seen by themselves.    Date of Onset: one month  Mechanism of injury: Reports insidious onset without acute precipitating event.  Location of Pain: left groin, and bilateral low back. Feels like hips/upper thighs are \"tight\" but no radicular pain or tingling.  Worsened by: activity  Better with: position change  Treatments tried: rest/activity avoidance, heat, Tylenol, and home " exercises  Associated symptoms: no distal numbness or tingling; denies swelling or warmth    Patient's PMH, PSH, and family hx reviewed.      OBJECTIVE:  /74 (BP Location: Left arm, Patient Position: Sitting, Cuff Size: Adult Large)   Pulse 75   Resp 18   Wt 85.3 kg (188 lb)   LMP  (LMP Unknown)   SpO2 97%   BMI 34.39 kg/m     Alert, NAD  NC/AT  Sclerae anicteric  Regular  Resp nonlabored  Skin warm and dry  No pain with L hip flex/IR/ER  No focal neuro deficits.   5/5 str BLE  SILT BLE   Normal gait.  Appropriate affect      RADIOLOGY:  Results for orders placed or performed in visit on 11/19/24   XR Hip Left 2-3 Views     Status: None    Narrative    Radiologist Consultation/:   Fax:  [phone]  [phone]  _____________________________________________________________________________________________________________________________________________________________________________________________________________________________________________________________________________________________________________________________________________________________________________________________________________________________________________________________________________________________________  PATIENT NAME: MICHELLE, TIERNEY J  YOB: 1950 Age: 74 ACCESSION NUMBER: [id number]  SEX: F ORDERING PROVIDER: Stephne Demetrio  FACILITY: [facility] PRIMARY PROVIDER:  PATIENT ID: [id number] INTERESTED PARTY:  Page 1 of 1  _________________________________________________________________________________________________________________________________________________________________________________________________________________________________________________________________________________________________________________________________________________________________________________________  EXAM: XRAY HIP,UNI,2+VWS LEFT  LOCATION: [facility]  DATE:  11/19/2024  INDICATION: Hip pain.  COMPARISON: Radiographs from 7/26/2016.  IMPRESSION:  1. There is a new right total hip arthroplasty. Excellent position and alignment of components. No evidence of  complication.  2. Moderate osteoarthrosis of the left hip, progressed.  3. Degenerative changes in the spine.  4. There is no evidence of fracture or osteonecrosis.  SIGNED BY: Sami Pierre MD 11/20/2024 8:21 AM   XR Lumbar Spine 2/3 Views     Status: None    Narrative    Radiologist Consultation/:   Fax:  [phone]  [phone]  _____________________________________________________________________________________________________________________________________________________________________________________________________________________________________________________________________________________________________________________________________________________________________________________________________________________________________________________________________________________________________  PATIENT NAME: MICHELLE, TIERNEY J  YOB: 1950 Age: 74 ACCESSION NUMBER: [id number]  SEX: F ORDERING PROVIDER: Stephen Demetrio  FACILITY: [facility] PRIMARY PROVIDER:  PATIENT ID: [id number] INTERESTED PARTY:  Page 1 of 1  _________________________________________________________________________________________________________________________________________________________________________________________________________________________________________________________________________________________________________________________________________________________________________________________  EXAM: XRAY LUMBAR SPINE-2 OR 3 VIEW  LOCATION: [facility]  DATE: 11/19/2024  INDICATION: M54.50 g89.29 chronic bilateral low back pain , unspecified whether sciatica present.  COMPARISON: None.  IMPRESSION: No fracture is identified. Multilevel mild  degenerative disc disease. Moderate degenerative disc  disease at L5-S1. Moderate lower facet arthropathy.  SIGNED BY: Sergio Yu MD 11/20/2024 8:10 AM

## 2024-11-19 NOTE — PATIENT INSTRUCTIONS
Tylenol 1000mg 3x/day safe    Start with home exercises, let me know if you want PT referral    Can coordinate hip injection or surgery consult if desired    [facility]   [address]  [address]  [place] [facility] [address]  [phone] - appt line

## 2025-01-25 DIAGNOSIS — I10 ESSENTIAL HYPERTENSION, BENIGN: ICD-10-CM

## 2025-01-27 RX ORDER — HYDROCHLOROTHIAZIDE 25 MG/1
25 TABLET ORAL DAILY
COMMUNITY
Start: 2025-01-27

## 2025-01-27 NOTE — TELEPHONE ENCOUNTER
Refused Prescriptions:                       Disp   Refills    hydrochlorothiazide (HYDRODIURIL) 25 MG ta*                Sig: Take 1 tablet (25 mg) by mouth daily  Refused By: CORTNEY DESAI  Reason for Refusal: Patient needs appointment    Pt due for a six month med check due Feb 25  Sent my chart message  Cortney

## 2025-02-03 ENCOUNTER — MYC REFILL (OUTPATIENT)
Dept: FAMILY MEDICINE | Facility: CLINIC | Age: 75
End: 2025-02-03

## 2025-02-03 DIAGNOSIS — I10 ESSENTIAL HYPERTENSION, BENIGN: ICD-10-CM

## 2025-02-03 NOTE — TELEPHONE ENCOUNTER
Pt has OV scheduled 2/12/25, needs ext of medication until then    Pending Prescriptions:                       Disp   Refills    hydrochlorothiazide (HYDRODIURIL) 25 MG t*10 tab*0            Sig: Take 1 tablet (25 mg) by mouth daily.    Routing to Dr. Atkinson to be sent in.

## 2025-02-04 RX ORDER — HYDROCHLOROTHIAZIDE 25 MG/1
25 TABLET ORAL DAILY
Qty: 10 TABLET | Refills: 0 | Status: SHIPPED | OUTPATIENT
Start: 2025-02-04

## 2025-02-04 RX ORDER — HYDROCHLOROTHIAZIDE 25 MG/1
25 TABLET ORAL DAILY
COMMUNITY
Start: 2025-02-04

## 2025-02-12 ENCOUNTER — OFFICE VISIT (OUTPATIENT)
Dept: FAMILY MEDICINE | Facility: CLINIC | Age: 75
End: 2025-02-12

## 2025-02-12 VITALS
SYSTOLIC BLOOD PRESSURE: 136 MMHG | TEMPERATURE: 97 F | BODY MASS INDEX: 34.02 KG/M2 | WEIGHT: 186 LBS | OXYGEN SATURATION: 96 % | HEART RATE: 77 BPM | DIASTOLIC BLOOD PRESSURE: 76 MMHG

## 2025-02-12 DIAGNOSIS — E78.2 MIXED HYPERLIPIDEMIA: ICD-10-CM

## 2025-02-12 DIAGNOSIS — H10.45 CHRONIC ALLERGIC CONJUNCTIVITIS: ICD-10-CM

## 2025-02-12 DIAGNOSIS — Z12.11 COLON CANCER SCREENING: ICD-10-CM

## 2025-02-12 DIAGNOSIS — N18.32 STAGE 3B CHRONIC KIDNEY DISEASE (H): ICD-10-CM

## 2025-02-12 DIAGNOSIS — F43.23 ADJUSTMENT DISORDER WITH MIXED ANXIETY AND DEPRESSED MOOD: ICD-10-CM

## 2025-02-12 DIAGNOSIS — I10 ESSENTIAL HYPERTENSION, BENIGN: ICD-10-CM

## 2025-02-12 DIAGNOSIS — K21.9 GASTROESOPHAGEAL REFLUX DISEASE WITHOUT ESOPHAGITIS: ICD-10-CM

## 2025-02-12 DIAGNOSIS — E03.8 SUBCLINICAL HYPOTHYROIDISM: Primary | ICD-10-CM

## 2025-02-12 LAB
BUN SERPL-MCNC: 27 MG/DL (ref 7–25)
BUN/CREATININE RATIO: 20 (ref 6–32)
CALCIUM SERPL-MCNC: 9.6 MG/DL (ref 8.6–10.3)
CHLORIDE SERPLBLD-SCNC: 101.8 MMOL/L (ref 98–110)
CHOLEST SERPL-MCNC: 239 MG/DL (ref 0–199)
CHOLEST/HDLC SERPL: 3 {RATIO} (ref 0–5)
CO2 SERPL-SCNC: 28.2 MMOL/L (ref 20–32)
CREAT SERPL-MCNC: 1.36 MG/DL (ref 0.6–1.3)
GFR SERPL CREATININE-BSD FRML MDRD: 41 ML/MIN/1.73M2
GLUCOSE SERPL-MCNC: 99 MG/DL (ref 60–99)
HDLC SERPL-MCNC: 71 MG/DL (ref 40–150)
LDLC SERPL CALC-MCNC: 128 MG/DL (ref 0–129)
POTASSIUM SERPL-SCNC: 4.61 MMOL/L (ref 3.5–5.3)
SODIUM SERPL-SCNC: 138.7 MMOL/L (ref 135–146)
TRIGL SERPL-MCNC: 199 MG/DL (ref 0–149)

## 2025-02-12 PROCEDURE — 80061 LIPID PANEL: CPT | Performed by: STUDENT IN AN ORGANIZED HEALTH CARE EDUCATION/TRAINING PROGRAM

## 2025-02-12 PROCEDURE — 36415 COLL VENOUS BLD VENIPUNCTURE: CPT | Performed by: STUDENT IN AN ORGANIZED HEALTH CARE EDUCATION/TRAINING PROGRAM

## 2025-02-12 PROCEDURE — 99214 OFFICE O/P EST MOD 30 MIN: CPT | Performed by: STUDENT IN AN ORGANIZED HEALTH CARE EDUCATION/TRAINING PROGRAM

## 2025-02-12 PROCEDURE — G2211 COMPLEX E/M VISIT ADD ON: HCPCS | Performed by: STUDENT IN AN ORGANIZED HEALTH CARE EDUCATION/TRAINING PROGRAM

## 2025-02-12 PROCEDURE — 80048 BASIC METABOLIC PNL TOTAL CA: CPT | Performed by: STUDENT IN AN ORGANIZED HEALTH CARE EDUCATION/TRAINING PROGRAM

## 2025-02-12 RX ORDER — MONTELUKAST SODIUM 10 MG/1
1 TABLET ORAL AT BEDTIME
Qty: 90 TABLET | Refills: 3 | Status: SHIPPED | OUTPATIENT
Start: 2025-02-12

## 2025-02-12 RX ORDER — OMEPRAZOLE 20 MG/1
CAPSULE, DELAYED RELEASE ORAL
Qty: 90 CAPSULE | Refills: 3 | Status: SHIPPED | OUTPATIENT
Start: 2025-02-12

## 2025-02-12 RX ORDER — PRAVASTATIN SODIUM 20 MG
20 TABLET ORAL AT BEDTIME
Qty: 90 TABLET | Refills: 3 | Status: SHIPPED | OUTPATIENT
Start: 2025-02-12

## 2025-02-12 RX ORDER — LOSARTAN POTASSIUM 100 MG/1
100 TABLET ORAL DAILY
Qty: 90 TABLET | Refills: 1 | Status: SHIPPED | OUTPATIENT
Start: 2025-02-12

## 2025-02-12 RX ORDER — BUPROPION HYDROCHLORIDE 150 MG/1
150 TABLET ORAL EVERY MORNING
Qty: 90 TABLET | Refills: 1 | Status: SHIPPED | OUTPATIENT
Start: 2025-02-12

## 2025-02-12 RX ORDER — AMLODIPINE BESYLATE 5 MG/1
5 TABLET ORAL DAILY
Qty: 90 TABLET | Refills: 1 | Status: SHIPPED | OUTPATIENT
Start: 2025-02-12

## 2025-02-12 RX ORDER — HYDROCHLOROTHIAZIDE 25 MG/1
25 TABLET ORAL DAILY
Qty: 90 TABLET | Refills: 1 | Status: SHIPPED | OUTPATIENT
Start: 2025-02-12

## 2025-02-12 ASSESSMENT — ANXIETY QUESTIONNAIRES
1. FEELING NERVOUS, ANXIOUS, OR ON EDGE: NOT AT ALL
3. WORRYING TOO MUCH ABOUT DIFFERENT THINGS: NOT AT ALL
5. BEING SO RESTLESS THAT IT IS HARD TO SIT STILL: NOT AT ALL
GAD7 TOTAL SCORE: 1
IF YOU CHECKED OFF ANY PROBLEMS ON THIS QUESTIONNAIRE, HOW DIFFICULT HAVE THESE PROBLEMS MADE IT FOR YOU TO DO YOUR WORK, TAKE CARE OF THINGS AT HOME, OR GET ALONG WITH OTHER PEOPLE: NOT DIFFICULT AT ALL
6. BECOMING EASILY ANNOYED OR IRRITABLE: NOT AT ALL
GAD7 TOTAL SCORE: 1
2. NOT BEING ABLE TO STOP OR CONTROL WORRYING: NOT AT ALL
7. FEELING AFRAID AS IF SOMETHING AWFUL MIGHT HAPPEN: NOT AT ALL

## 2025-02-12 ASSESSMENT — PATIENT HEALTH QUESTIONNAIRE - PHQ9
SUM OF ALL RESPONSES TO PHQ QUESTIONS 1-9: 2
5. POOR APPETITE OR OVEREATING: SEVERAL DAYS

## 2025-02-12 NOTE — PATIENT INSTRUCTIONS
Blood work today    No change to medications     Let me know if you don't get cologuard kit    Follow-up in 6 months, sooner if concerns

## 2025-02-12 NOTE — NURSING NOTE
Chief Complaint   Patient presents with    Recheck Medication     Refills, recheck BP, fasting     Pre-visit Screening:  Immunizations:  up to date  Colonoscopy:  is up to date  Mammogram: is up to date  Asthma Action Test/Plan:  na  PHQ9:  na  GAD7:  na  Questioned patient about current smoking habits Pt. currently smokes.  Advised about smoking cessation.  Ok to leave detailed message on voice mail for today's visit only yes, phone # [phone] (home) [phone] (work)

## 2025-02-12 NOTE — PROGRESS NOTES
Assessment & Plan     1. Essential hypertension, benign  Stable, labs today, continue current medication   - amLODIPine (NORVASC) 5 MG tablet; Take 1 tablet (5 mg) by mouth daily.  Dispense: 90 tablet; Refill: 1  - hydrochlorothiazide (HYDRODIURIL) 25 MG tablet; Take 1 tablet (25 mg) by mouth daily.  Dispense: 90 tablet; Refill: 1  - losartan (COZAAR) 100 MG tablet; Take 1 tablet (100 mg) by mouth daily.  Dispense: 90 tablet; Refill: 1  - Basic Metabolic Panel (BFP)    2. Adjustment disorder with mixed anxiety and depressed mood  Stable, continue current medication   - buPROPion (WELLBUTRIN XL) 150 MG 24 hr tablet; Take 1 tablet (150 mg) by mouth every morning.  Dispense: 90 tablet; Refill: 1    3. Chronic allergic conjunctivitis  Stable, continue current medication   - montelukast (SINGULAIR) 10 MG tablet; Take 1 tablet (10 mg) by mouth at bedtime.  Dispense: 90 tablet; Refill: 3    4. Gastroesophageal reflux disease without esophagitis  Stable, continue current medication   - omeprazole (PRILOSEC) 20 MG DR capsule; TAKE ONE CAPSULE BY MOUTH ONE TIME DAILY  Dispense: 90 capsule; Refill: 3    5. Mixed hyperlipidemia  Stable, labs today, continue current medication   - pravastatin (PRAVACHOL) 20 MG tablet; Take 1 tablet (20 mg) by mouth at bedtime.  Dispense: 90 tablet; Refill: 3  - Lipid Panel (BFP)    6. Subclinical hypothyroidism (Primary)  Recheck labs, asx   - TSH WITH FREE T4 REFLEX ([facility])    7. Stage 3b chronic kidney disease (H)  Monitor, Nephrology follow-up annually  - Basic Metabolic Panel (BFP)    8. Colon cancer screening  - COLOGUARD([employer]); Future     Patient Instructions   Blood work today    No change to medications     Let me know if you don't get cologuard kit    Follow-up in 6 months, sooner if concerns      Reasons to follow-up sooner or seek emergent care reviewed.     Stephen Atkinson MD, [facility]      Subjective     Keyonna Garcia is a 74 year old female  who presents to clinic today for the following health issues:    HPI   Chief Complaint   Patient presents with    Recheck Medication     Refills, recheck BP, fasting      Hyperlipidemia Follow-Up  Are you regularly taking any medication or supplement to lower your cholesterol?   Yes- statin  Are you having muscle aches or other side effects that you think could be caused by your cholesterol lowering medication?  No    Hypertension Follow-up  Doing well, no concerns   Are your blood pressures ever more than 140 on the top number (systolic) OR more       than 90 on the bottom number (diastolic), for example 140/90? No     Depression Followup  How are you doing with your depression since your last visit? No change  Are you having other symptoms that might be associated with depression? No  Are you feeling anxious or having panic attacks?   No  Do you have any concerns with your use of alcohol or other drugs? No        8/23/2023     9:46 AM 8/20/2024     9:36 AM 2/12/2025    10:17 AM   PHQ   PHQ-9 Total Score 1 2 2   Q9: Thoughts of better off dead/self-harm past 2 weeks Not at all Not at all Not at all         8/23/2023     9:46 AM 8/20/2024     9:36 AM 2/12/2025    10:17 AM   HOWARD-7 SCORE   Total Score 3 5 1             Objective    /76 (BP Location: Left arm, Patient Position: Sitting, Cuff Size: Adult Regular)   Pulse 77   Temp 97  F (36.1  C) (Temporal)   Wt 84.4 kg (186 lb)   LMP  (LMP Unknown)   SpO2 96%   BMI 34.02 kg/m    Body mass index is 34.02 kg/m .  Alert, NAD  NC/AT  Sclerae anicteric  RRR  Resp nonlabored  Skin warm and dry  Speech intact. Normal gait.  Appropriate affect       Labs reviewed.

## 2025-02-13 LAB
T4, FREE, NON-DIALYSIS - QUEST: 1.1 NG/DL (ref 0.8–1.8)
TSH SERPL-ACNC: 4.85 MIU/L (ref 0.4–4.5)

## 2025-08-06 ENCOUNTER — TRANSFERRED RECORDS (OUTPATIENT)
Dept: FAMILY MEDICINE | Facility: CLINIC | Age: 75
End: 2025-08-06